# Patient Record
Sex: MALE | Race: WHITE | Employment: UNEMPLOYED | URBAN - METROPOLITAN AREA
[De-identification: names, ages, dates, MRNs, and addresses within clinical notes are randomized per-mention and may not be internally consistent; named-entity substitution may affect disease eponyms.]

---

## 2018-10-12 ENCOUNTER — RECORDS - HEALTHEAST (OUTPATIENT)
Dept: LAB | Facility: CLINIC | Age: 33
End: 2018-10-12

## 2018-10-12 LAB — VALPROATE SERPL-MCNC: 14.4 UG/ML (ref 50–150)

## 2018-10-26 ENCOUNTER — RECORDS - HEALTHEAST (OUTPATIENT)
Dept: LAB | Facility: CLINIC | Age: 33
End: 2018-10-26

## 2018-10-26 LAB — VALPROATE SERPL-MCNC: 41 UG/ML (ref 50–150)

## 2019-01-03 ENCOUNTER — RECORDS - HEALTHEAST (OUTPATIENT)
Dept: LAB | Facility: CLINIC | Age: 34
End: 2019-01-03

## 2019-01-03 LAB — VALPROATE SERPL-MCNC: 84.5 UG/ML (ref 50–150)

## 2019-11-02 ENCOUNTER — HOSPITAL ENCOUNTER (INPATIENT)
Facility: CLINIC | Age: 34
LOS: 57 days | Discharge: PSYCHIATRIC OR SUBSTANCE USE FACILITY | DRG: 885 | End: 2019-12-30
Attending: EMERGENCY MEDICINE | Admitting: PSYCHIATRY & NEUROLOGY
Payer: MEDICARE

## 2019-11-02 DIAGNOSIS — F29 PSYCHOSIS, UNSPECIFIED PSYCHOSIS TYPE (H): ICD-10-CM

## 2019-11-02 PROCEDURE — 99285 EMERGENCY DEPT VISIT HI MDM: CPT | Mod: 25 | Performed by: EMERGENCY MEDICINE

## 2019-11-02 PROCEDURE — 99285 EMERGENCY DEPT VISIT HI MDM: CPT | Mod: Z6 | Performed by: EMERGENCY MEDICINE

## 2019-11-02 SDOH — HEALTH STABILITY: MENTAL HEALTH: HOW OFTEN DO YOU HAVE A DRINK CONTAINING ALCOHOL?: NEVER

## 2019-11-03 PROBLEM — R46.89 DISORGANIZED BEHAVIOR: Status: ACTIVE | Noted: 2019-11-03

## 2019-11-03 PROCEDURE — 12400001 ZZH R&B MH UMMC

## 2019-11-03 PROCEDURE — 99223 1ST HOSP IP/OBS HIGH 75: CPT | Mod: AI | Performed by: PSYCHIATRY & NEUROLOGY

## 2019-11-03 RX ORDER — OLANZAPINE 10 MG/2ML
10 INJECTION, POWDER, FOR SOLUTION INTRAMUSCULAR 3 TIMES DAILY PRN
Status: DISCONTINUED | OUTPATIENT
Start: 2019-11-03 | End: 2019-12-30 | Stop reason: HOSPADM

## 2019-11-03 RX ORDER — ALUMINA, MAGNESIA, AND SIMETHICONE 2400; 2400; 240 MG/30ML; MG/30ML; MG/30ML
30 SUSPENSION ORAL EVERY 4 HOURS PRN
Status: DISCONTINUED | OUTPATIENT
Start: 2019-11-03 | End: 2019-12-30 | Stop reason: HOSPADM

## 2019-11-03 RX ORDER — ACETAMINOPHEN 325 MG/1
650 TABLET ORAL EVERY 4 HOURS PRN
Status: DISCONTINUED | OUTPATIENT
Start: 2019-11-03 | End: 2019-12-30 | Stop reason: HOSPADM

## 2019-11-03 RX ORDER — BISACODYL 10 MG
10 SUPPOSITORY, RECTAL RECTAL DAILY PRN
Status: DISCONTINUED | OUTPATIENT
Start: 2019-11-03 | End: 2019-12-30 | Stop reason: HOSPADM

## 2019-11-03 RX ORDER — OLANZAPINE 10 MG/1
10 TABLET ORAL 3 TIMES DAILY PRN
Status: DISCONTINUED | OUTPATIENT
Start: 2019-11-03 | End: 2019-12-30 | Stop reason: HOSPADM

## 2019-11-03 RX ORDER — HYDROXYZINE HYDROCHLORIDE 25 MG/1
25 TABLET, FILM COATED ORAL EVERY 4 HOURS PRN
Status: DISCONTINUED | OUTPATIENT
Start: 2019-11-03 | End: 2019-12-30 | Stop reason: HOSPADM

## 2019-11-03 RX ORDER — OLANZAPINE 10 MG/1
10 TABLET ORAL
Status: DISCONTINUED | OUTPATIENT
Start: 2019-11-03 | End: 2019-11-03

## 2019-11-03 RX ORDER — TRAZODONE HYDROCHLORIDE 50 MG/1
50 TABLET, FILM COATED ORAL
Status: DISCONTINUED | OUTPATIENT
Start: 2019-11-03 | End: 2019-12-06

## 2019-11-03 RX ORDER — OLANZAPINE 10 MG/2ML
10 INJECTION, POWDER, FOR SOLUTION INTRAMUSCULAR
Status: DISCONTINUED | OUTPATIENT
Start: 2019-11-03 | End: 2019-11-03

## 2019-11-03 ASSESSMENT — ACTIVITIES OF DAILY LIVING (ADL)
TOILETING: 0-->INDEPENDENT
DRESS: SCRUBS (BEHAVIORAL HEALTH);INDEPENDENT
RETIRED_EATING: 0-->INDEPENDENT
HYGIENE/GROOMING: PROMPTS
ORAL_HYGIENE: PROMPTS
SWALLOWING: 0-->SWALLOWS FOODS/LIQUIDS WITHOUT DIFFICULTY
BATHING: 0-->INDEPENDENT
AMBULATION: 0-->INDEPENDENT
COGNITION: 1 - ATTENTION OR MEMORY DEFICITS
RETIRED_COMMUNICATION: 2-->DIFFICULTY UNDERSTANDING (NOT RELATED TO LANGUAGE BARRIER)
LAUNDRY: UNABLE TO COMPLETE
DRESS: 0-->INDEPENDENT
TRANSFERRING: 0-->INDEPENDENT

## 2019-11-03 ASSESSMENT — MIFFLIN-ST. JEOR: SCORE: 1646.6

## 2019-11-03 NOTE — PLAN OF CARE
Patient was admitted on a 72 HH from [facility] ED to [facility] for disorganized behavior. Pt was picked up by [facility] police after interacting with him more than once, he was dressed inappropriately for the weather and appeared unable to care for himself. In ED pt was minimally cooperative, refused labs, UTOX, and LINDA, but states he has not been using drugs or drinking per ED RN report. Pt denies SI/HI upon arrival to the unit.    Pt did not request to notify anyone of his arrival to the unit.     Mental Health History: Unable to assess at this time, pt appears to be delayed and experiencing some thought blocking      Medical History: Per ED RN report patient has blister(s) on feet, noted to be limping/hobbling when walking, did not allow this writer to assess his feet or skin at this time. ED RN also reports that pt denies any recent fall hx. Pt denies any medication or food allergies.       Plan: Patient was minimally cooperative with search and admission process, denied SI/HI and wanted to go to bed. Patient affect blunt/fat and appears tense and guarded, audible teeth grinding noted. Patient was not placed on precautions at this time d/t no interview. Patient is code 1 status 15, was oriented to unit upon arrival. Will encourage patient to complete safety plan and participate in groups.

## 2019-11-03 NOTE — ED NOTES
Bed: HW01  Expected date: 11/2/19  Expected time: 10:03 PM  Means of arrival: Ambulance  Comments:  SPFD 23  34 M    Transport hold

## 2019-11-03 NOTE — PROGRESS NOTES
patient spent the early shift sleeping and the later shift in the lounge. He glared at varying people, ranging from patients to staff. He was also seen mouthing profanities and looking angry, but did not escalate toward anyone. He responded well to mildly firm directions and declined complying with most requests throughout the shift. He stood in one area by the coffee for several hours without major incident. Behavior and affect remains erratic and unpredictable.

## 2019-11-03 NOTE — PROGRESS NOTES
Attempted to examine patients feet at the request of primary nurse as he is limping and ED documentation states he has blisters on his feet. Patient states he has already had his feet checked and declines further exam at this time. Patient is very guarded. Will recheck later and see if patient is open to exam at that time.

## 2019-11-03 NOTE — PROGRESS NOTES
"Initial Psychosocial Assessment    I have reviewed the chart, attempted to meet with the patient    Patient declined to meet with [facility]. [facility] reviewed notes available for assessment, DEC assessment was not available.     The original assessment was completed by LASHANDA Hughes, which are in black.    The addendum of the assessment was completed by Ramesh Narayan [facility], which are in blue.The added information was obtained from the pre-petition screening report completed by Arnulfo Delarosa.    Presenting Problem:  Patient is a 34 year old male who was brought in by ambulance for disorganized behavior.  Police had been called and had found him inappropriately dressed for the weather on a bridge.  Per intake note, \"pt brought by police after they were called for the second time about concerns for his safety.  Pt was found on a bridge, disheveled and inappropriately dressed for the weather.  Police reported pt was visibly shaking due to the cold but refused to be taken somewhere for warmth.  Pt is withdrawn in the ED and stated, \"I'm fine\" and responded \"I don't know\" when he was asked how he got to the hospital.  MD also reported pt seems to be having issues with processing and appears psychotic.  No known MH hx or known hx of aggression.\"    Patient is on a 72 hour hold.      History of Mental Health and Chemical Dependency:  Hermann is currently homeless and returned to [place] sometime between the end of October 2019 and 11/2/19 for reasons unknown.  He came from [place] had previously spent some time in residential and lived with his father for a while.  His mom last saw him in [facility] this year.  She reported that Hermann reached out to his former  for assistance and they had also met with his PO officer who offered a bed at a homeless shelter, but patient declined.     He has be diagnosed with schizophrenia and bipolar disorder and marijuana use.   He has been evaluated at least 2 times " "by [facility] in 2016 and 2017.  In July 2016 he had checked himself into [facility] on a voluntary basis due to \"delusions that his brain was removed.\"    Patient was on a stayed commitment in 2016 and it was extended until October of 2017 due to dismissal because pt had moved to [place] to live with his father.. In 2016 he was placed at [facility] due to his behaviors and mental health. Records also indicates that his has been previously committed in [place].  They also indicates that he had been working with mental health providers through [facility].  He has received  case mgmt services through [employer]    In 2016, report indicates that he had been on Ranitidine, Trazodone, and Zyprexa,     Family Description (Constellation, Family Psychiatric History):  He was born and raised by his parents in [place], [place].  He has two older sisters.  His parents  when he was about `[age] yrs old.  Some time after the divorce he was court ordered to live with his father due to truancy issues and skipping school when he lived with his mother. Eventually he returned to live with his mother.  He was placed in foster care when he was [age] yrs old.   He did not graduate from high school and has not yet earned his GED.    Hermann has been  and  from his wife for many years and she is seeking a divorce.  They have they have a nine yr old daughter who lives in [place] with her mom.  Hermann also has a son who is about 3 yrs old, who he has never seen.     No family history of mental illness.  There is history of Alcoholism on his maternal side of the family.     Significant Life Events (Illness, Abuse, Trauma, Death):  Unknown    Living Situation:  Homeless from [place], recently came to [place] between 10/31/19 and 11/2/19    Educational Background:  Hermann started skipping school after his parents .   Truancy courts were involved.  He did not complete high school and he has not yet earned his GED. "     Occupational History:  Unemployed; during past 4-5 yrs he has worked part time jobs but loses them because he does not show up for work.     Financial Status:  SSDI he started receiving 2 or 3 yrs ago. And has Rep Payee services in place.     Legal Issues:  No current legal issues in [place].  He has a history of being involved with civil commitment court in [facility]. Per pre-petition screening report he also has a history of being in shelter in [place]. He is currently on probation in [place], [place].     Ethnic/Cultural Issues:  His presentation is similar to someone with catatonia.  It is very difficult  To communicate with him.  He spends a significant amount of time just standing a staring.   He may have some issues with his limited education as he never completed high school.     Spiritual Orientation:  Unknown     Service History  He is not a .     Social Functioning (organization, interests):  Inability to function and care for himself.  Per collateral information, Hermann does fine when he is supervised and taking medications.  Once he stops his medication he severely decompensates.  Based on the report, it seems that he has an inability to verbally communicate when decompensated, as his mother made reference to this to the PO.    Current Treatment Providers are:  No current mental health providers.    [facility] (Current)-in [place], [place], no other identifying information known.     [facility]   Phone: [phone] ext. [phone](NO ROIs and he is listed as confidential)    Princess Patel, Mother & Madi Patel, mother's  [phone] (NO ROIs and he is listed as confidential) Address [address] [place] [address]    Social Service Assessment/Plan:  The plan is to assess the patient for mental health and medication needs. The patient will be prescribed medications to treat the identified symptoms. Patient will  participate in therapeutic skill building groups on the unit. CTC to coordinate discharge/after care planning.

## 2019-11-03 NOTE — ED NOTES
ED to Behavioral Floor Handoff    SITUATION  Hermann Arita is a 34 year old male who speaks English and lives home status is unknown unknown The patient arrived in the ED by ambulance ( [email or web address]) from last time on bridge with a complaint of Suicidal (Pt was found by [facility] police twice today, last time Pt was on a bridge.  Pt not dressed appropriately for weather.)  .The patient's current symptoms started/worsened 1 day(s) ago and during this time the symptoms have increased.   In the ED, pt was diagnosed with   Final diagnoses:   Psychosis, unspecified psychosis type (H)        Initial vitals were: BP: 130/75  Pulse: 85  Temp: 97.6  F (36.4  C)  Resp: 16  Weight: (teddy)  SpO2: 99 %   --------  Is the patient diabetic? (pt would not answer)  If yes, last blood glucose? --     If yes, was this treated in the ED? --  --------  Is the patient inebriated (ETOH) No or Impaired on other substances? No (patient states he hasn't been drinking today  MSSA done? N/A  Last MSSA score: --    Were withdrawal symptoms treated? No  Does the patient have a seizure history? (pt would not answer) If yes, date of most recent seizure--  --------  Is the patient patient experiencing suicidal ideation? denies current or recent suicidal ideation   (from previous RN report)  Homicidal ideation? (pt would not answer)  Self-injurious behavior/urges? (pt would not answer)  ------  Was pt aggressive in the ED No  Was a code called No  Is the pt now cooperative? No  -------  Meds given in ED: Medications - No data to display   Family present during ED course? No  Family currently present? No    BACKGROUND  Does the patient have a cognitive impairment or developmental disability? No  Allergies: No Known Allergies.   Social demographics are   Social History     Socioeconomic History     Marital status: Single     Spouse name: None     Number of children: None     Years of education: None     Highest education level: None    Occupational History     None   Social Needs     Financial resource strain: None     Food insecurity:     Worry: None     Inability: None     Transportation needs:     Medical: None     Non-medical: None   Tobacco Use     Smoking status: Light Tobacco Smoker     Packs/day: 0.00     Smokeless tobacco: Never Used   Substance and Sexual Activity     Alcohol use: Not Currently     Frequency: Never     Drug use: None     Sexual activity: None   Lifestyle     Physical activity:     Days per week: None     Minutes per session: None     Stress: None   Relationships     Social connections:     Talks on phone: None     Gets together: None     Attends Congregational service: None     Active member of club or organization: None     Attends meetings of clubs or organizations: None     Relationship status: None     Intimate partner violence:     Fear of current or ex partner: None     Emotionally abused: None     Physically abused: None     Forced sexual activity: None   Other Topics Concern     None   Social History Narrative     None        ASSESSMENT  Labs results Labs Ordered and Resulted from Time of ED Arrival Up to the Time of Departure from the ED - No data to display   Imaging Studies: No results found for this or any previous visit (from the past 24 hour(s)).   Most recent vital signs /75   Pulse 85   Temp 97.6  F (36.4  C) (Oral)   Resp 16   SpO2 99%    Abnormal labs/tests/findings requiring intervention:---   Pain control: good  (pt had none)  Nausea control: pt had none    RECOMMENDATION  Are any infection precautions needed (MRSA, VRE, etc.)? No If yes, what infection? --  ---  Does the patient have mobility issues? independently. If yes, what device does the pt use? ---  ---  Is patient on 72 hour hold or commitment? Yes If on 72 hour hold, have hold and rights been given to patient? Yes  Are admitting orders written if after 10 p.m. ?N/A  Tasks needing to be completed:---     Mildred Bernard RN   ascom--  Main ED   [phone] West ED   [phone] East ED

## 2019-11-03 NOTE — PROGRESS NOTES
11/03/19 0128   Patient Belongings   Did you bring any home meds/supplements to the hospital?  No   Patient Belongings locker   Patient Belongings Remaining with Patient clothing   Patient Belongings Put in Hospital Secure Location (Security or Locker, etc.) clothing;shoes   Belongings Search Yes   Clothing Search Yes   Second Staff Fabio HUGHES   Comment Non sent to security     Items kept in storage  Jacket, Shoes, Hat, Lighter and cigs, $.56 ( fifty six cents) and Legal Document.    Non sent to security    No ID, No Credit Card, No Cash    Admission Signature    Patient Signature    ________________    Staff Signature    ________________    2nd Staff Signature if pt can't sign    ____________________    Admission Signature    All my personal items were returned to me    Patient Signature    _______________    Staff Signature    ________________

## 2019-11-03 NOTE — ED PROVIDER NOTES
"  History     Chief Complaint   Patient presents with     Suicidal     Pt was found by [facility] police twice today, last time Pt was on a bridge.  Pt not dressed appropriately for weather.     HPI  Hermann Arita is a 34 year old male who presents to the ED on a transport hold by police who found him on the bridge.  They said he was inappropriately dressed for 34 degree weather.  They say he was shaking from the cold and refused to go some place to get warm.  They were worried about his safety so brought him here.      I have reviewed the Medications, Allergies, Past Medical and Surgical History, and Social History in the Epic system.    Review of Systems   Unable to perform ROS: Other       Physical Exam   BP: 130/75  Pulse: 85  Temp: 97.6  F (36.4  C)  Resp: 16  Weight: (teddy)  SpO2: 99 %      Physical Exam  Vitals signs and nursing note reviewed.   Constitutional:       Comments: Sitting on a chair giving brief answers and saying everything is fine.  Says he doesn't know how he got here, or why he is here. Disheveled.mp eye contact. Turns his back to you when you try to talk to him.    HENT:      Head: Normocephalic and atraumatic.      Nose: Nose normal.   Eyes:      Extraocular Movements: Extraocular movements intact.   Neck:      Musculoskeletal: Normal range of motion.   Cardiovascular:      Rate and Rhythm: Normal rate and regular rhythm.      Heart sounds: Normal heart sounds.   Pulmonary:      Effort: Pulmonary effort is normal.      Breath sounds: Normal breath sounds.   Musculoskeletal:      Comments: \"waddles\" when he walks.  He has clear blisters on the bottom of his feet.  Pulses intact.  Skin warm.  No dark discoloration.    Skin:     General: Skin is warm and dry.   Neurological:      General: No focal deficit present.      Mental Status: He is oriented to person, place, and time.         ED Course        Procedures        Labs Ordered and Resulted from Time of ED Arrival Up to the Time of " "Departure from the ED - No data to display         Assessments & Plan (with Medical Decision Making)   The patient presents to the ED on a transport hold by police.  They were called to a disturbance and found him on a bridge, inappropriately dressed for the cold weather.  He was shaking and wouldn't leave to go to a warm area.  They were concerned for his safety and brought him here. He is sitting in a chair and keeps tensing his jaw muscle stating \"im fine\".  He seems psychotic or at least mentally unwell.  Given what police witnessed and his inability to give more history, I will place him on a hold and admit to inpatient mental health.  There is no evidence of head trauma to suggest doing a head CT.  His vitals are stable and don't suggest infection.   We will check basic labs.  He refuses to give urine sample, though he is refusing everything unless you become more directional in what you want to do.  For example, if you ask if you can check labs he says no but if you say we are checking labs, he then complies.  A have placed him on a 72 hour hold for admission.  Epic review shows no prior records here.     He refuses lab checks and urine check.      I have reviewed the nursing notes.    I have reviewed the findings, diagnosis, plan and need for follow up with the patient.    New Prescriptions    No medications on file       Final diagnoses:   Psychosis, unspecified psychosis type (H)       11/2/2019   [facility], [place], EMERGENCY DEPARTMENT     Adela Tobias MD  11/03/19 0021       Adela Tobias MD  11/03/19 0050    "

## 2019-11-03 NOTE — PROGRESS NOTES
Hermann refused his vital signs this afternoon. RN notified.       11/03/19 7076   Vital Signs   Temp   (Refused)   Pulse   (Refused)   BP   (Refused)

## 2019-11-03 NOTE — PROGRESS NOTES
"Pt was shown to his room as he requested to go to sleep and refused interview. When patient lay in his bed he stated \"who the fuck are you, don't look at me\" when his roommate was laying in his bed facing him. This writer asked pt x 4 if he was able to be safe with a roommate or if he felt like hurting someone else. Pt refused to answer until he eventually gave a verbal no on 4th request and closed his eyes. This writer notified ANS and intake of possibility that pt may not be roommate appropriate but will continue to assess and monitor behaviors. Pt has been placed in room across from desk and both roommates appear to be asleep at this time.   "

## 2019-11-03 NOTE — ED NOTES
"Requested Pt to provide a urine sample and to perform an alcohol breathalyzer test.  Pt refused, stated, \"I am not going to do that, I have not been drinking.\"  "

## 2019-11-03 NOTE — H&P
Admitted:     11/02/2019      CHIEF COMPLAINT:  A 34-year-old man brought in due to concerns of psychosis.      HISTORY OF PRESENT ILLNESS:  The patient is a 34-year-old man who was brought in as he was found outside dressed inappropriately for the weather by police.  When the police saw him, he was disorganized.  They brought him to the hospital because they were concerned about his mental health and his ability to take care of himself.  He continued to behave in the same way in the ER.  Did not answer questions.  Repeatedly said nothing was wrong.  Was mostly uncooperative.  ER physician also thought that he was likely psychotic.  We do not see a DEC assessment note.  When I saw the patient, he is tense in bed.  He repeatedly is clicking his jaw or biting something that in his mouth.  He denies everything.  Denies he has ever had a mental illness.  Denies ever been in the hospital.  Denies he has ever being prescribed medications.  He denies all symptoms on a comprehensive psychiatric review of systems.  Denies drug use.  Denies any past medical history.  Denies any family history.  In the end, the interview was extremely limited.  He did have significant delays in responses.  I did need to repeat a question a few times.  Seems to be clearly responding to internal stimuli.      PAST PSYCHIATRIC HISTORY:  Unknown, patient is uncooperative, but he denies that he has ever had any psychiatric history.      PAST MEDICAL HISTORY:  The patient denies any chronic or historical medical issues.      SUBSTANCE HISTORY:  The patient denies tobacco, alcohol or illicit substance use.      PHYSICAL REVIEW OF SYSTEMS:  The patient denies all problems on 10-point review of systems, except as noted in HPI.      FAMILY HISTORY:  The patient denies all family history of mental illness.      SOCIAL HISTORY:  The patient refused to answer where he lives.  I asked if he is homeless; he said no, but he would not tell me anything  otherwise.      ALLERGIES:  NO KNOWN DRUG ALLERGIES.      PRIOR TO ADMISSION MEDICATIONS:  The patient denies taking any medications or having any medications prescribed ever in his life.      LABORATORY RESULTS:  The patient has refused all laboratory workup thus far, including urine tox and blood work.      VITAL SIGNS:  Temperature 96.9, pulse is 86, respiratory rate is 18, blood pressure is 103/53, oxygen saturation 94% on room air.      PHYSICAL EXAMINATION:  I reviewed physical exam as taken by emergency room physician, Dr. Adela Tobias dated 11/02/2019.  I have no additional findings at this time.      MENTAL STATUS EXAMINATION:  The patient is awake.  He is alert.  He is oriented to person but not place or date.  He is wearing hospital scrubs.  He has poor eye contact.  When he does make eye contact, it is quite intense.  He is uncooperative.  He has a paucity of spontaneous speech.  Language is otherwise intact.  Mood is angry.  Affect is guarded, tense.  He has no psychomotor agitation or retardation.  Strength and tone and gait and station were not tested as patient refused to get out of bed.  Thought process is difficult to assess, appears concrete.  Associations appear intact.  Thought content notable for evidence of internal stimuli.  He seems to have some thought blocking.  Recent and remote memory are unable to be assessed due to lack of cooperation.  Attention and concentration are intact.  Fund of knowledge is assumed adequate but not able to be formally tested due to lack of cooperation.  Insight is poor.  Judgment is poor.      DIAGNOSES:     1.  Unspecified psychosis     2.  The patient apparently has blisters on his feet.  He did not allow me to examine him.      ASSESSMENT:  The patient is a 34-year-old man who appears to be psychotic at this point in time.  We have no known history.  He is completely uncooperative.  He is on a 72-hour hold.  He will require some observational time and team will  need to determine if they want to petition for commitment, given the fact that he was outside in the cold.  He would be at high risk for frostbite, hypothermia, or other problems related to exposure given the winter is approaching; may need to petition for commitment.      PLAN:   1.  Currently have olanzapine available, 10 mg 3 times daily, as needed for psychosis.  The patient is not agreeable to scheduled medications at this time and because he is here involuntarily, he would require neuroleptic consent, so will not order them right now.   2.  Primary team to consider getting Wound Care to look at his feet if they are able to determine that he indeed has some wounds that are concerning.   3.  Will order routine labs for tomorrow morning including CBC with platelets, differential, comprehensive metabolic panel, lipid panel and TSH with T4.  Primary team may also wish to consider neurological imaging, given that we have no history in this patient.   4.  Legal:  The patient is currently on a 72-hour hold.  Primary team to assess to determine if they need to petition for commitment if they will drop the hold.   5.  Disposition to be determined by primary team based on how they choose to resolve his 72-hour hold.         FISH HALL MD             D: 2019   T: 2019   MT: KIT      Name:     RALPH BAE   MRN:      [id number]        Account:      [id number]   :      1985        Admitted:     2019                   Document: [id number]

## 2019-11-04 PROCEDURE — 99233 SBSQ HOSP IP/OBS HIGH 50: CPT | Performed by: PSYCHIATRY & NEUROLOGY

## 2019-11-04 PROCEDURE — 12400001 ZZH R&B MH UMMC

## 2019-11-04 RX ORDER — OLANZAPINE 10 MG/1
10 TABLET, ORALLY DISINTEGRATING ORAL AT BEDTIME
Status: DISCONTINUED | OUTPATIENT
Start: 2019-11-04 | End: 2019-11-18

## 2019-11-04 ASSESSMENT — ACTIVITIES OF DAILY LIVING (ADL)
DRESS: INDEPENDENT
HYGIENE/GROOMING: INDEPENDENT
ORAL_HYGIENE: INDEPENDENT

## 2019-11-04 NOTE — PROGRESS NOTES
"[facility], [place]   Psychiatric Progress Note  Hospital Day: 1        Interim History:   The patient's care was discussed with the treatment team during the daily team meeting and/or staff's chart notes were reviewed.  Staff report patient has been intensely staring at various staff and patients. Affect remains erratic and unpredictable. Pt is very guarded. He appears to be responding to internal stimuli, frequently with subvocalizations. He would engage in odd maneurisms, such as extending arms while crouching down.     Upon interview, the patient was sleeping in his room. Multiple staff and I entered his room and attempted to awaken him multiple times. Pt then abruptly sat up in bed, looked at all team members, stood up, and said \"I need coffee.\" He then abruptly left his room and requested coffee. He then stood in hallway while drinking coffee quite rapidly. He did not make eye contact with any staff members. He swayed at times. He shook his head \"no\" when asked if he would meet in his room, and when asked to sign paperwork for Care Everywhere. He did not respond to any additional questions despite several attempts. Patient had no further requests or concerns.          Medications:          Allergies:   No Known Allergies       Labs:   No results found for this or any previous visit (from the past 24 hour(s)).       Psychiatric Examination:     /53   Pulse 86   Temp 96.9  F (36.1  C) (Oral)   Resp 18   Ht 1.702 m (5' 7\")   Wt 75.3 kg (166 lb)   SpO2 94%   BMI 26.00 kg/m    Weight is 166 lbs 0 oz  Body mass index is 26 kg/m .    Weight over time:  Vitals:    11/03/19 0128   Weight: 75.3 kg (166 lb)       Orthostatic Vitals     None            Cardiometabolic risk assessment. 11/04/19      Reviewed patient profile for cardiometabolic risk factors    Date taken /Value  REFERENCE RANGE   Abdominal Obesity  (Waist Circumference)   See nursing flowsheet Women ?35 in (88 cm) "   Men ?40 in (102 cm)      Triglycerides  No results found for: TRIG    ?150 mg/dL (1.7 mmol/L) or current treatment for elevated triglycerides   HDL cholesterol  No results found for: HDL]   Women <50 mg/dL (1.3 mmol/L) in women or current treatment for low HDL cholesterol  Men <40 mg/dL (1 mmol/L) in men or current treatment for low HDL cholesterol     Fasting plasma glucose (FPG) No results found for: GLC   FPG ?100 mg/dL (5.6 mmol/L) or treatment for elevated blood glucose   Blood pressure  BP Readings from Last 3 Encounters:   11/03/19 103/53    Blood pressure ?130/85 mmHg or treatment for elevated blood pressure   Family History  See family history     Appearance: awake, alert and disheveled . Subvocalizations. Audible grinding of his teeth.   Attitude:  guarded and uncooperative  Eye Contact:  poor , frequently looking at name board  Mood:  anxious  Affect:  mood congruent, intensity is blunted and guarded  Speech:  unable to assess. Pt was mostly unresponsive.  Language: fluent and intact in English  Psychomotor, Gait, Musculoskeletal: Pt swaying back and forth when standing. Grinding teeth repeatedly.  no evidence of tardive dyskinesia, dystonia, or tics  Throught Process:  disorganized and evidence of thought blocking present  Associations:  unable to assess  Thought Content:  patient appears to be responding to internal stimuli  Insight:  limited  Judgement:  limited  Oriented to:  unable to assess  Attention Span and Concentration:  poor  Recent and Remote Memory:  limited  Fund of Knowledge:  low-normal    Clinical Global Impressions  First:  Considering your total clinical experience with this particular patient population, how severe are the patient's symptoms at this time?: 7 (11/03/19 0907)  Compared to the patient's condition at the START of treatment, this patient's condition is:: 4 (11/03/19 0907)  Most recent:  Considering your total clinical experience with this particular patient population,  how severe are the patient's symptoms at this time?: 7 (11/03/19 0907)  Compared to the patient's condition at the START of treatment, this patient's condition is:: 4 (11/03/19 0907)           Precautions:     Behavioral Orders   Procedures     Code 1 - Restrict to Unit     Routine Programming     As clinically indicated     Status 15     Every 15 minutes.          Diagnoses:     Schizophrenia, decompensated         Assessment & Plan:     Assessment and hospital summary:  The patient is a 34-year-old man who appears to be psychotic at this point in time. He was brought to ED by police after he was located wandering near a bridge while wearing inappropriate clothing for cold weather. He had blisters and cuts on his feet. We have no known history.  He is completely uncooperative with examination.  He is on a 72-hour hold.  Will petition for MI commitment given he is at high risk for frostbite, hypothermia, or other problems related to exposure given the winter is approaching, has demonstrated a clear inability to care for self, and refusing psychotropic medications despite severe symptoms of psychosis.     Target psychiatric symptoms and interventions:  Add Zyprexa 10 mg at bedtime to target severe sx of psychosis. Pt may decline.     Medical Problems and Treatments:  No reported medical concerns. Will continue to monitor closely. Will again attempt lab draw in AM.    Behavioral/Psychological/Social:  Encourage unit programming  Pt has been consuming excessive amounts of coffee. Will attempt to limit to 2 cups of coffee before noon and 3 cups of coffee after noon.     Legal: 72 hr hold. Petitioning for MI commitment with [facility] through [facility].    Safety:  - Continue precautions as noted above  - Status 15 minute checks    Disposition: Pending clinical stabilization.     Ann Marie Woods MD  [facility] Psychiatry

## 2019-11-04 NOTE — PROGRESS NOTES
Refused labs, VS's and breakfast this morning. Slept in until Dr. Woods and staff woke patient up middle morning. Started drinking a lot of coffee after waking up. Limits were set on amount of coffee patient can drink. Would not let writer look at blisters on feet. Has been standing out in yun the rest of the morning.

## 2019-11-04 NOTE — PROGRESS NOTES
"Hermann spent most of the evening in the common areas isolating himself from engaging in conversation with peers.  He appears disorganized and preoccupied in thoughts.  He appears to be actively responding to internal stimuli, mouthing words and talking to himself.  In attempts to engage in casual conversation he oftentimes gives minimal and delayed responses.  He appears guarded, paranoid and suspicious.  At one time began staring at another patient in the dining room, even after he was given negative feedback from that patient he continued to stare, staff then intervened by encouraging him to watch a movie that was playing in the lounge.  He eventually came to the [facility] to watch the movie.  Throughout the evening he consumed copious amounts of fluids such as an entire carraffe of decaffeinated coffee, and consumed a great deal of snacks in addition to his dinner tray.    He came to Community Meeting but appeared disorganized and confused when asked \"What is your favorite food for Thanksgiving?\"  He was given some examples of what other patients gave as their answer, but he just stood there and stared at this writer.  He spent most of the group standing by the chairs during the group, at one time he crouched down with his arms extended in front of him and began to shake.  This happened again a few more times throughout the evening.  At the moment he currently has no scheduled medications and refuses to take PRN medications.     11/03/19 1435   Sleep/Rest/Relaxation   Day/Evening Time Hours up all shift   Behavioral Health   Hallucinations appears responding   Thinking confused;distractable;paranoid;poor concentration   Orientation person: oriented;place: oriented   Memory other (see comment)  (ZAHRA)   Insight poor   Judgement impaired   Eye Contact staring;at examiner   Affect blunted, flat;tense   Mood anxious;labile;irritable   Physical Appearance/Attire untidy;disheveled   Hygiene neglected grooming - unclean " body, hair, teeth   Suicidality other (see comments)  (ZAHRA)   Self Injury other (see comment)  (ZAHRA)   Elopement   (No concernable statements or behaviors observed. )   Activity isolative;withdrawn;restless   Speech coherent   Medication Sensitivity other (see comment)  (No scheduled meds. Refuses PRNs)   Psychomotor / Gait balanced;steady;tics   Overt Aggression Scale   Verbal Aggression 0   Aggression against Property 0   Auto-Aggression 0   Physical Aggression 0   Overt Aggression Total Score 0   Coping/Psychosocial Interventions   Supportive Measures relaxation techniques promoted;other (see comments)  (Appropriate distraction encouraged. )   Activities of Daily Living   Hygiene/Grooming prompts   Oral Hygiene prompts   Dress scrubs (behavioral health);independent   Laundry unable to complete   Room Organization unable   Hygiene Care Assistance   Hygiene Assistance independent   Groups   Details Art Therapy Group.  (Did not attend group.)

## 2019-11-04 NOTE — PLAN OF CARE
"BEHAVIORAL TEAM DISCUSSION    Participants: Lea Woods MD; Ramesh Narayan [facility]; Carmelina Ramos RN     Progress: Pt is on day 1 of hospital stay.  Hermann Arita, 34 year old White male was admitted to [facility] on 72 hour hold after he was found by [place] Police on a bridge disorganized, disheveled, and inappropriately dressed for 34  weather.  It was his second interaction with police for the day.  He was found shaking from the cold, but was not willing to leave and go to someplace warm.  The brought him in due to concern for his safety.  While in the emergency room he refused urine for drug screen and no breath test.  He provided minimal responses to questions asked, but continued to report that he was \"fine\".  He is currently homeless.  It is unknown if he has community providers or if he is on medications.  He was under a Stayed commitment to [facility] and [place] Commissioner in 2016 and was closed in 2017.  Since on unit he has displayed some odd mannerisms, overly drinking coffee, or in his room sleeping.  He has been disorganized, disheveled, and refused labs and vitals.  Staff have noted him to be glaring at peers and staff making them uncomfortable.  He refused to sign [facility] for Care Everywhere records.  He has been eating and sleeping adequately. He refused scheduled medications.  The treatment team will petition with [facility].    Anticipated length of stay: unknown; he is refusing medications and is quite symptomatic.  He is on 72 hour hold and treatment team is petitioning for civil commitment.     Continued Stay Criteria/Rationale: disorganized behaviors with refusal of treatment.  He is currently on a 72 hour hold that will  19.  Pt is a high risk if discharged to the community without appropriate care and treatment due to his inability to care for himself.  He is at a high risk for frost bite, hypothermia, and other problems related to exposure given winter is approaching and he was " inappropriately dressed in 34  prior to admission.  He has blisters on the bottom of his feet.    Medical/Physical: ER provider noted clear blisters on bottom of his feet and he was walking with a waddle.  Pt would not allow admitting psychiatrist, attending psychiatrist, nor nurse look at his feet.    Precautions:   Behavioral Orders   Procedures     Code 1 - Restrict to Unit     Routine Programming     As clinically indicated     Status 15     Every 15 minutes.     Plan: Target psychiatric symptoms and interventions:  Add Zyprexa 10 mg at bedtime to target severe sx of psychosis. Pt may decline.      Medical Problems and Treatments:  No reported medical concerns. Will continue to monitor closely. Will again attempt lab draw in AM.     Behavioral/Psychological/Social:  Encourage unit programming  Pt has been consuming excessive amounts of coffee. Will attempt to limit to 2 cups of coffee before noon and 3 cups of coffee after noon.      Legal: 72 hr hold. Petitioning for MI commitment with [facility] through [facility].     Safety:  - Continue precautions as noted above  - Status 15 minute checks     Disposition: Pending clinical stabilization.     Rationale for change in precautions or plan: 72 hour hold.

## 2019-11-04 NOTE — PROGRESS NOTES
Treatment team is petitioning for MI civil commitment with [facility].  Case was screened with Indira Tillman with [facility].  She reported that case will likely be assigned to Jessica Mon.  Appropriate documents were faxed.    Jessica will meet with patient tomorrow morning around 9am.

## 2019-11-05 PROCEDURE — 99233 SBSQ HOSP IP/OBS HIGH 50: CPT | Performed by: PSYCHIATRY & NEUROLOGY

## 2019-11-05 PROCEDURE — 12400001 ZZH R&B MH UMMC

## 2019-11-05 ASSESSMENT — ACTIVITIES OF DAILY LIVING (ADL)
DRESS: PROMPTS
DRESS: PROMPTS
LAUNDRY: UNABLE TO COMPLETE
ORAL_HYGIENE: PROMPTS
ORAL_HYGIENE: PROMPTS
HYGIENE/GROOMING: PROMPTS
HYGIENE/GROOMING: PROMPTS

## 2019-11-05 NOTE — PLAN OF CARE
Pt refused labs today, refused breakfast and vitals. Pt laying in bed and just shakes his head no other response.  Will continue to monitor pt closely.

## 2019-11-05 NOTE — PROGRESS NOTES
Pt spent most of the evening sleeping in his room. He was isolative and withdrawn. He wasn't very cooperative this evening, but he was calm. He refused to have his vitals taken, have his blood drawn by lab staff, or check in with the writer. At one point, he came out of his room and stood at the  but was not responding to staff's willingness to assist him with something. He ate his dinner and watched tv/movies for a while. He didn't have any behavioral outbursts.      11/04/19 2215   Behavioral Health   Hallucinations other (see comment)  (ZAHRA)   Thinking confused;poor concentration   Orientation person: oriented   Memory other (see comment)  (ZAHRA)   Insight poor   Judgement impaired   Eye Contact at examiner   Affect blunted, flat;tense   Mood mood is calm   Physical Appearance/Attire appears stated age;attire appropriate to age and situation   Hygiene neglected grooming - unclean body, hair, teeth   Suicidality other (see comments)  (ZAHRA)   1. Wish to be Dead (Recent)   (ZAHRA)   2. Non-Specific Active Suicidal Thoughts (Recent)   (ZAHRA)   Self Injury other (see comment)  (ZAHRA)   Elopement   (none)   Activity withdrawn;isolative   Speech clear   Medication Sensitivity no stated side effects;no observed side effects   Psychomotor / Gait steady;balanced   Activities of Daily Living   Hygiene/Grooming independent   Oral Hygiene independent   Dress independent   Room Organization independent

## 2019-11-05 NOTE — PROGRESS NOTES
"patient spent the early shift by the desk, staring at staff. He spent the latter half sleeping and napping. No cooperation for treatment with some responding to redirection to get away from the desk. He denies all issues, but certainly seems disoriented, not knowing where he was beyond \"[place].\"       11/05/19 6937   Behavioral Health   Hallucinations other (see comment)  (ZAHRA)   Thinking confused;paranoid   Orientation date, disoriented;time, disoriented;place, disoriented;person, disoriented   Insight poor;denial of illness   Judgement impaired   Eye Contact at examiner;staring   Affect blunted, flat   Mood mood is calm   Physical Appearance/Attire disheveled;untidy   Hygiene neglected grooming - unclean body, hair, teeth   1. Wish to be Dead (Recent) No   2. Non-Specific Active Suicidal Thoughts (Recent) No   Activity isolative;withdrawn   Speech clear;pressured   Medication Sensitivity no stated side effects;no observed side effects   Psychomotor / Gait balanced;steady   Psycho Education   Type of Intervention 1:1 intervention   Response participates, initiates socially appropriate   Hours 0.5   Activities of Daily Living   Hygiene/Grooming prompts   Oral Hygiene prompts   Dress prompts   Room Organization independent     "

## 2019-11-05 NOTE — PROGRESS NOTES
Pre-petition screener came to meet with patient today, however he refused to meet with her.    No updates from the county/screener as of 3:30pm today.  Will check in with screener tomorrow to see if they have come up with a determination.

## 2019-11-05 NOTE — PROGRESS NOTES
Jessica Elieser, from [facility] Pre-petition, came to the unit today. She requested and  I gave her the legal paperwork.    I don't believe that I keagan Forced Neuroleptic paperwork but will check.    Staff attempted to encourage pt to meet with the screener but he refused to do so. Screener was going to approach him in his room with staff.    I placed a c opy of the legal paperwork for scanning and placed a copy in  the chart.

## 2019-11-05 NOTE — PROGRESS NOTES
"[facility], [place]   Psychiatric Progress Note  Hospital Day: 2        Interim History:   The patient's care was discussed with the treatment team during the daily team meeting and/or staff's chart notes were reviewed.  Staff report patient affect remains erratic and unpredictable. Pt is very guarded. He appears to be responding to internal stimuli, frequently with subvocalizations. He is drinking excessive amounts of coffee. Eating well. He has been refusing assessment of his feet. He has remained calm with no significant behavioral concerns, though has been mostly uncooperative with labs, ROIs, and vitals. Declined meeting with prepetition screener today.    Upon interview, the patient was awake and standing near  in hallway. He initially was very guarded though slightly more forthcoming as interview progressed. He said that he would like to leave the hospital now and \"walk around.\" He did not respond when asked where he would go or where he was previously staying. He acknowledged that he has no friends or family in [place]. He then said \"I want to go to a different hospital so I am not locked up,\" though again did not elaborate. He said \"I don't need medications\" when asked again whether medications have been helpful for him. He denied any physical pain. When asked if nursing staff could examine his feet, he said \"No, they're fine. Better now.\" He again declined when asked to sign paperwork for Care Everywhere. He did not respond to any additional questions despite several attempts. Patient had no further requests or concerns.          Medications:       OLANZapine zydis  10 mg Oral At Bedtime          Allergies:   No Known Allergies       Labs:   No results found for this or any previous visit (from the past 24 hour(s)).       Psychiatric Examination:     /53   Pulse 86   Temp 96.9  F (36.1  C) (Oral)   Resp 18   Ht 1.702 m (5' 7\")   Wt 75.3 kg (166 lb)   SpO2 94%  " " BMI 26.00 kg/m    Weight is 166 lbs 0 oz  Body mass index is 26 kg/m .    Weight over time:  Vitals:    11/03/19 0128   Weight: 75.3 kg (166 lb)       Orthostatic Vitals     None            Cardiometabolic risk assessment. 11/04/19      Reviewed patient profile for cardiometabolic risk factors    Date taken /Value  REFERENCE RANGE   Abdominal Obesity  (Waist Circumference)   See nursing flowsheet Women ?35 in (88 cm)   Men ?40 in (102 cm)      Triglycerides  No results found for: TRIG    ?150 mg/dL (1.7 mmol/L) or current treatment for elevated triglycerides   HDL cholesterol  No results found for: HDL]   Women <50 mg/dL (1.3 mmol/L) in women or current treatment for low HDL cholesterol  Men <40 mg/dL (1 mmol/L) in men or current treatment for low HDL cholesterol     Fasting plasma glucose (FPG) No results found for: GLC   FPG ?100 mg/dL (5.6 mmol/L) or treatment for elevated blood glucose   Blood pressure  BP Readings from Last 3 Encounters:   11/03/19 103/53    Blood pressure ?130/85 mmHg or treatment for elevated blood pressure   Family History  See family history     Appearance: awake, alert and disheveled . Subvocalizations. Audible grinding of his teeth. Swaying back and forth with arms crossed.  Attitude:  guarded and uncooperative  Eye Contact:  poor , frequently looking at name board  Mood:  anxious  Affect:  mood congruent, intensity is blunted and guarded  Speech:  unable to assess. Pt was mostly unresponsive. though more communicative today.  Language: fluent and intact in English  Psychomotor, Gait, Musculoskeletal: Pt swaying back and forth when standing. Grinding teeth repeatedly.  no evidence of tardive dyskinesia, dystonia, or tics  Throught Process:  disorganized and evidence of thought blocking present  Associations:  unable to assess  Thought Content:  patient appears to be responding to internal stimuli  Insight:  limited  Judgement:  limited  Oriented to:  Person. Said that it was \"the 11th " "month\" of the year. Knew it was 2019. Believed he was in [place], though knew he is in [place].   Attention Span and Concentration:  poor  Recent and Remote Memory:  limited  Fund of Knowledge:  low-normal    Clinical Global Impressions  First:  Considering your total clinical experience with this particular patient population, how severe are the patient's symptoms at this time?: 7 (11/03/19 0907)  Compared to the patient's condition at the START of treatment, this patient's condition is:: 4 (11/03/19 0907)  Most recent:  Considering your total clinical experience with this particular patient population, how severe are the patient's symptoms at this time?: 7 (11/03/19 0907)  Compared to the patient's condition at the START of treatment, this patient's condition is:: 4 (11/03/19 0907)           Precautions:     Behavioral Orders   Procedures     Code 1 - Restrict to Unit     Routine Programming     As clinically indicated     Status 15     Every 15 minutes.          Diagnoses:     Schizophrenia, decompensated         Assessment & Plan:     Assessment and hospital summary:  The patient is a 34-year-old man who appears to be psychotic at this point in time. He was brought to ED by police after he was located wandering near a bridge while wearing inappropriate clothing for cold weather. He had blisters and cuts on his feet. We have no known history.  He is completely uncooperative with examination.  He is on a 72-hour hold.  Will petition for MI commitment given he is at high risk for frostbite, hypothermia, or other problems related to exposure given the winter is approaching, has demonstrated a clear inability to care for self, and refusing psychotropic medications despite severe symptoms of psychosis.     Target psychiatric symptoms and interventions:  Resume Zyprexa 10 mg at bedtime to target severe sx of psychosis. Pt may decline.     Medical Problems and Treatments:  Pt continues to refuse foot examination, though " denies pain.  No reported medical concerns. Will continue to monitor closely. Will again attempt lab draw in AM.    Behavioral/Psychological/Social:  Encourage unit programming  Pt has been consuming excessive amounts of coffee. Will attempt to limit to 2 cups of coffee before noon and 3 cups of coffee after noon.     Legal: 72 hr hold. Petitioning for MI commitment with [employer] through [facility].    Safety:  - Continue precautions as noted above  - Status 15 minute checks    Disposition: Pending clinical stabilization. Will likely benefit from discharge to a structured setting.    Ann Marie Woods MD  [facility] Psychiatry

## 2019-11-06 PROCEDURE — 12400001 ZZH R&B MH UMMC

## 2019-11-06 ASSESSMENT — ACTIVITIES OF DAILY LIVING (ADL)
DRESS: STREET CLOTHES;SCRUBS (BEHAVIORAL HEALTH)
LAUNDRY: UNABLE TO COMPLETE
HYGIENE/GROOMING: PROMPTS
LAUNDRY: WITH SUPERVISION
ORAL_HYGIENE: INDEPENDENT
ORAL_HYGIENE: INDEPENDENT
HYGIENE/GROOMING: INDEPENDENT
DRESS: SCRUBS (BEHAVIORAL HEALTH)

## 2019-11-06 NOTE — PROGRESS NOTES
Hermann refused his vital signs this afternoon. RN notified.       11/06/19 0282   Vital Signs   Temp   (Refused)   Pulse   (Refused)   BP   (Refused)

## 2019-11-06 NOTE — PROGRESS NOTES
CTC received call from Arnulfo Mondragon, Jessica Lopez reported that she staffed the case and they are supporting the petition for civil commitment due to mental illness.  She also noted that she was able to get contact information for his mother who is in [place].  She reported that patient's mother provided valuable information.  She noted that she would have her report submitted by 12pm and team should hear from the courts by the end of business.

## 2019-11-06 NOTE — PROGRESS NOTES
Pt refused to check-in with this writer. He was calm with blunted, flat mood affect, isolative and withdrawn to his room for the majority part of the shift, otherwise he was visibile in common areas usually standing in the dinning area. He refused to eat breakfast, but ate his lunch 100 percent.       11/06/19 1333   Behavioral Health   Thinking poor concentration   Orientation person, disoriented;place, disoriented;date, disoriented;time, disoriented   Insight denial of illness   Judgement impaired   Eye Contact at examiner   Affect blunted, flat   Mood mood is calm   Physical Appearance/Attire attire appropriate to age and situation   Hygiene neglected grooming - unclean body, hair, teeth   Suicidality   (nothing observed or stated )   1. Wish to be Dead (Recent)   (nothing observed or stated )   2. Non-Specific Active Suicidal Thoughts (Recent)   (nothing observed or stated )   Self Injury   (nothing observed or stated )   Elopement   (nothing observed or stated )   Activity withdrawn;isolative   Speech mute;pressured   Medication Sensitivity   (nothing observed or stated )   Psychomotor / Gait steady;balanced   Psycho Education   Type of Intervention other (see comment)   Response observes from a distance   Hours 0.5   Activities of Daily Living   Hygiene/Grooming independent   Oral Hygiene independent   Dress street clothes;scrubs (behavioral health)   Laundry with supervision   Room Organization independent

## 2019-11-06 NOTE — PROGRESS NOTES
Arpita from [facility] called giving a verbal, stating that patient was placed on a court ordered hold as of 1614 11/6/2019.

## 2019-11-06 NOTE — PROGRESS NOTES
Pt spent the first half of the evening standing in the yun.  He was not able to answer staff's questions.  Pt was encouraged to shower but refused.  Pt did eat dinner in the lounge.  Pt did move to his bed after finishing.

## 2019-11-07 PROCEDURE — 12400001 ZZH R&B MH UMMC

## 2019-11-07 PROCEDURE — 99232 SBSQ HOSP IP/OBS MODERATE 35: CPT | Performed by: PSYCHIATRY & NEUROLOGY

## 2019-11-07 RX ORDER — LORAZEPAM 1 MG/1
1-2 TABLET ORAL 3 TIMES DAILY PRN
Status: DISCONTINUED | OUTPATIENT
Start: 2019-11-07 | End: 2019-12-30 | Stop reason: HOSPADM

## 2019-11-07 ASSESSMENT — ACTIVITIES OF DAILY LIVING (ADL): DRESS: SCRUBS (BEHAVIORAL HEALTH)

## 2019-11-07 NOTE — PROGRESS NOTES
Writer received pre-petition screening report and updated patient's psychosocial assessment.  Writer also added patient's mother's name and contact info into the system in case emergency, however patient remains a confidential listing.

## 2019-11-07 NOTE — PROGRESS NOTES
"Patient spent the early part of the shift in the hallway and kitchen area standing, holding on to his scrub pants and shifting his feet back and forth. Patient declined a check in and is refusing his HS medication. Declined to have vitals done. Ate dinner and snack. Patient's adls are poor and was offered to give him new scrubs but declined. Patient not socializing with peers or staff except to make requests for coffee. Cooperative on unit.     Patient evaluation continues. Assessed mood,anxiety,thoughts and behavior.     Patient gradually progressing towards goals.    Patient is encouraged to participate in groups and assisted to develop healthy coping skills.     VS reviewed: /53   Pulse 86   Temp 96.9  F (36.1  C) (Oral)   Resp 18   Ht 1.702 m (5' 7\")   Wt 75.3 kg (166 lb)   SpO2 94%   BMI 26.00 kg/m      Length of stay: 3    Refer to daily team meeting notes for individualized plan of care. Nursing will continue to assess.      "

## 2019-11-07 NOTE — PROGRESS NOTES
Patient slept through mid-morning before emerging from his room about 1030.  Overall presentation - quiet and withdrawn with minimal social or program engagement.  Spent most of his milieu time standing at different locations rocking back and forth from one foot to another and staring into space, frequently appearing to be responding to internal stimuli (talking to himself).  Refused vital signs and most other staff requests and interventions.  Extremely guarded in writer's attempted check-ins with him.  Essentially denied all MH concerns and issues.  Has presented no behavioral management issues today.  Hygiene remains poor.  Appetite appeared selective.   Davian Florence   11/07/2019

## 2019-11-07 NOTE — PROGRESS NOTES
"[facility], [place]   Psychiatric Progress Note  Hospital Day: 4        Interim History:   The patient's care was discussed with the treatment team during the daily team meeting and/or staff's chart notes were reviewed.  Staff report that patient is very guarded. He appears to be responding to internal stimuli, frequently with subvocalizations and disorganized thought process. Eating well. He has been refusing assessment of his feet. He has remained calm with no significant behavioral concerns, though has been mostly uncooperative with labs, ROIs, and vitals. Declining scheduled Zyprexa. Frequently swaying back and forth while responding to internal stimuli.     Upon interview, the patient was awake and standing in lounge. He declined to meet with writer 1:1. He said that he is \"good\" when asked about mood. He said \"good\" when asked how he is sleeping. He denied any physical health concerns. He said that medications have not been helpful and he will not take them. When asked about Ativan specifically, he said \"I don't like that one.\" He did not respond when asked why. He then appeared more tense, stared out window with evidence of subvocalizations. When asked who he was talking to and what he was saying, he said \"I was talking to you.\" He then continued to move his mouth frequently and grind his teeth. He did not respond to any additional questions despite several attempts. Patient had no further requests or concerns.          Medications:       OLANZapine zydis  10 mg Oral At Bedtime          Allergies:   No Known Allergies       Labs:   No results found for this or any previous visit (from the past 24 hour(s)).       Psychiatric Examination:     /53   Pulse 86   Temp 96.9  F (36.1  C) (Oral)   Resp 18   Ht 1.702 m (5' 7\")   Wt 75.3 kg (166 lb)   SpO2 94%   BMI 26.00 kg/m    Weight is 166 lbs 0 oz  Body mass index is 26 kg/m .    Weight over time:  Vitals:    11/03/19 0128 " "  Weight: 75.3 kg (166 lb)       Orthostatic Vitals     None            Cardiometabolic risk assessment. 11/04/19      Reviewed patient profile for cardiometabolic risk factors    Date taken /Value  REFERENCE RANGE   Abdominal Obesity  (Waist Circumference)   See nursing flowsheet Women ?35 in (88 cm)   Men ?40 in (102 cm)      Triglycerides  No results found for: TRIG    ?150 mg/dL (1.7 mmol/L) or current treatment for elevated triglycerides   HDL cholesterol  No results found for: HDL]   Women <50 mg/dL (1.3 mmol/L) in women or current treatment for low HDL cholesterol  Men <40 mg/dL (1 mmol/L) in men or current treatment for low HDL cholesterol     Fasting plasma glucose (FPG) No results found for: GLC   FPG ?100 mg/dL (5.6 mmol/L) or treatment for elevated blood glucose   Blood pressure  BP Readings from Last 3 Encounters:   No data found for BP    Blood pressure ?130/85 mmHg or treatment for elevated blood pressure   Family History  See family history     Appearance: awake, alert and disheveled . Subvocalizations. Audible grinding of his teeth. Swaying back and forth with arms crossed.  Attitude:  guarded and uncooperative  Eye Contact:  poor , frequently looking at name board  Mood:  anxious  Affect:  mood congruent, intensity is blunted and guarded  Speech:  unable to assess. Pt was mostly unresponsive. though more communicative today.  Language: fluent and intact in English  Psychomotor, Gait, Musculoskeletal: Pt swaying back and forth when standing. Grinding teeth repeatedly.  no evidence of tardive dyskinesia, dystonia, or tics  Throught Process:  disorganized and evidence of thought blocking present  Associations:  unable to assess  Thought Content:  patient appears to be responding to internal stimuli  Insight:  limited  Judgement:  limited  Oriented to:  Person. Said that it was \"the 11th month\" of the year. Knew it was 2019. Believed he was in [place], though knew he is in [place].   Attention Span and " Concentration:  poor  Recent and Remote Memory:  limited  Fund of Knowledge:  low-normal    Clinical Global Impressions  First:  Considering your total clinical experience with this particular patient population, how severe are the patient's symptoms at this time?: 7 (11/03/19 0907)  Compared to the patient's condition at the START of treatment, this patient's condition is:: 4 (11/03/19 0907)  Most recent:  Considering your total clinical experience with this particular patient population, how severe are the patient's symptoms at this time?: 7 (11/03/19 0907)  Compared to the patient's condition at the START of treatment, this patient's condition is:: 4 (11/03/19 0907)           Precautions:     Behavioral Orders   Procedures     Code 1 - Restrict to Unit     Routine Programming     As clinically indicated     Status 15     Every 15 minutes.          Diagnoses:     Schizophrenia, decompensated         Assessment & Plan:     Assessment and hospital summary:  The patient is a 34-year-old man who appears to be psychotic at this point in time. He was brought to ED by police after he was located wandering near a bridge while wearing inappropriate clothing for cold weather. He had blisters and cuts on his feet. We have no known history.  He is completely uncooperative with examination.  He is on a 72-hour hold.  Will petition for MI commitment given he is at high risk for frostbite, hypothermia, or other problems related to exposure given the winter is approaching, has demonstrated a clear inability to care for self, and refusing psychotropic medications despite severe symptoms of psychosis.     Target psychiatric symptoms and interventions:  Resume Zyprexa 10 mg at bedtime to target severe sx of psychosis. Pt may decline.     Medical Problems and Treatments:  Pt continues to refuse foot examination, though denies pain.  No reported medical concerns. Will continue to monitor closely. Will discontinue labs as patient has  consistently declined them.    Behavioral/Psychological/Social:  Encourage unit programming  Pt has been consuming excessive amounts of coffee. Will attempt to limit to 2 cups of coffee before noon and 3 cups of coffee after noon.     Legal: Court hold.    Safety:  - Continue precautions as noted above  - Status 15 minute checks    Disposition: Pending clinical stabilization. Will likely benefit from discharge to a structured setting.    Ann Marie Woods MD  [facility]

## 2019-11-07 NOTE — PROGRESS NOTES
Legal Documentation Note    Important Case Details  Involved County: [place]    Court File #: [id number]  Type of Order: Pre-petition Screening report  Effective Date:   Date of Expiration:   Assigned [facility] Worker/: Jessica Mon  [facility] Worker/ Info: [phone]    Order Specifics  Date Order Received:11/07/19  Method of Receipt:  served    Details of Court Order    Patient's : Mariano Redman  [phone]  Examination/Preliminary Hearing Date: Tuesday, November 12, 2019 @ 9am  Hearing: Friday, November 15, 2019    Did patient receive a copy? Yes  Was treatment team notified?Yes

## 2019-11-07 NOTE — PROGRESS NOTES
11/07/19 0842   Vital Signs   Temp   (refused)   Temp src   (refused)   Pulse   (refused)   BP   (refused)

## 2019-11-08 PROCEDURE — 12400001 ZZH R&B MH UMMC

## 2019-11-08 PROCEDURE — 99232 SBSQ HOSP IP/OBS MODERATE 35: CPT | Performed by: PSYCHIATRY & NEUROLOGY

## 2019-11-08 ASSESSMENT — ACTIVITIES OF DAILY LIVING (ADL)
HYGIENE/GROOMING: INDEPENDENT
ORAL_HYGIENE: INDEPENDENT
LAUNDRY: WITH SUPERVISION
DRESS: INDEPENDENT

## 2019-11-08 NOTE — PROGRESS NOTES
"[facility], [place]   Psychiatric Progress Note  Hospital Day: 5        Interim History:   The patient's care was discussed with the treatment team during the daily team meeting and/or staff's chart notes were reviewed.  Staff report that patient is quiet and withdrawn with minimal social or program engagement. He spent most of his time rocking back and forth appearing to be responding to internal stimuli with subvocalizations. Refusing vital signs and labs. Denied all MH sx. Hygiene is poor. Appetite appeared selective.    Upon interview, the patient was sleeping in his room. Did not wish to meet in interview room. He said \"Good\" when asked about appetite, sleep, and mood. When I inquired about his children, he said \"I don't want to talk about that.\" He did not respond when asked why he came to [place] from [place]. He denied physical pain or physical health concerns. He then continued to move his mouth frequently and grind his teeth. He did not respond to any additional questions despite several attempts. Patient had no further requests or concerns.          Medications:       OLANZapine zydis  10 mg Oral At Bedtime          Allergies:   No Known Allergies       Labs:   No results found for this or any previous visit (from the past 24 hour(s)).       Psychiatric Examination:     /53   Pulse 86   Temp 96.9  F (36.1  C) (Oral)   Resp 18   Ht 1.702 m (5' 7\")   Wt 75.3 kg (166 lb)   SpO2 94%   BMI 26.00 kg/m    Weight is 166 lbs 0 oz  Body mass index is 26 kg/m .    Weight over time:  Vitals:    11/03/19 0128   Weight: 75.3 kg (166 lb)       Orthostatic Vitals     None            Cardiometabolic risk assessment. 11/04/19      Reviewed patient profile for cardiometabolic risk factors    Date taken /Value  REFERENCE RANGE   Abdominal Obesity  (Waist Circumference)   See nursing flowsheet Women ?35 in (88 cm)   Men ?40 in (102 cm)      Triglycerides  No results found for: TRIG    " "?150 mg/dL (1.7 mmol/L) or current treatment for elevated triglycerides   HDL cholesterol  No results found for: HDL]   Women <50 mg/dL (1.3 mmol/L) in women or current treatment for low HDL cholesterol  Men <40 mg/dL (1 mmol/L) in men or current treatment for low HDL cholesterol     Fasting plasma glucose (FPG) No results found for: GLC   FPG ?100 mg/dL (5.6 mmol/L) or treatment for elevated blood glucose   Blood pressure  BP Readings from Last 3 Encounters:   No data found for BP    Blood pressure ?130/85 mmHg or treatment for elevated blood pressure   Family History  See family history     Appearance: Initially asleep but then later awake, alert and disheveled . Subvocalizations. Audible grinding of his teeth.  Attitude:  guarded and uncooperative  Eye Contact:  poor , frequently looking at name board  Mood:  anxious  Affect:  mood congruent, intensity is blunted and guarded  Speech:  unable to assess. Pt was mostly unresponsive. though more communicative today.  Language: fluent and intact in English  Psychomotor, Gait, Musculoskeletal: Pt swaying back and forth when standing. Grinding teeth repeatedly.  no evidence of tardive dyskinesia, dystonia, or tics  Throught Process:  disorganized and evidence of thought blocking present  Associations:  unable to assess  Thought Content:  patient appears to be responding to internal stimuli  Insight:  limited  Judgement:  limited  Oriented to:  Person. Said that it was \"the 11th month\" of the year. Knew it was 2019. Believed he was in [place], though knew he is in [place].   Attention Span and Concentration:  poor  Recent and Remote Memory:  limited  Fund of Knowledge:  low-normal    Clinical Global Impressions   First:  Considering your total clinical experience with this particular patient population, how severe are the patient's symptoms at this time?: 7 (11/03/19 0907)  Compared to the patient's condition at the START of treatment, this patient's condition is:: 4 " (11/03/19 0907)  Most recent:  Considering your total clinical experience with this particular patient population, how severe are the patient's symptoms at this time?: 7 (11/03/19 0907)  Compared to the patient's condition at the START of treatment, this patient's condition is:: 4 (11/03/19 0907)           Precautions:     Behavioral Orders   Procedures     Code 1 - Restrict to Unit     Routine Programming     As clinically indicated     Status 15     Every 15 minutes.          Diagnoses:     Schizophrenia, decompensated         Assessment & Plan:     Assessment and hospital summary:  The patient is a 34-year-old man who appears to be psychotic at this point in time. He was brought to ED by police after he was located wandering [place] a bridge while wearing inappropriate clothing for cold weather. He had blisters and cuts on his feet. We have no known history.  He is completely uncooperative with examination.  He is on a 72-hour hold.  Will petition for MI commitment given he is at high risk for frostbite, hypothermia, or other problems related to exposure given the winter is approaching, has demonstrated a clear inability to care for self, and refusing psychotropic medications despite severe symptoms of psychosis.     Target psychiatric symptoms and interventions:  Resume Zyprexa 10 mg at bedtime to target severe sx of psychosis. Pt may decline.     Medical Problems and Treatments:  Pt continues to refuse foot examination, though denies pain.  No reported medical concerns. Will continue to monitor closely. Will discontinue labs as patient has consistently declined them.    Behavioral/Psychological/Social:  Encourage unit programming  Pt has been consuming excessive amounts of coffee. Will attempt to limit to 2 cups of coffee before noon and 3 cups of coffee after noon.     Legal: Court hold.    Safety:  - Continue precautions as noted above  - Status 15 minute checks    Disposition: Pending clinical stabilization.  Will likely benefit from discharge to a structured setting.    Ann Marie Woods MD  [facility]

## 2019-11-08 NOTE — PROGRESS NOTES
11/08/19 1400   Behavioral Health   Hallucinations denies / not responding to hallucinations  (Pt. appears to be staring out into space)   Orientation   (Unknowin. Pt appears aggitated by too many questions.)   Judgement impaired   Eye Contact staring   Affect blunted, flat   Mood mood is calm   Physical Appearance/Attire untidy   Hygiene neglected grooming - unclean body, hair, teeth   Suicidality other (see comments)  (Pt.denies any suicidal thoughts.)   1. Wish to be Dead (Recent) No   2. Non-Specific Active Suicidal Thoughts (Recent) No   Elopement   (Pt.s behaviour appears to be unpredictable.)   Activity isolative;withdrawn;restless   Speech pressured;other (see comments)  (Pt. appears irritated by questions.)   Medication Sensitivity no stated side effects;no observed side effects   Psychomotor / Gait balanced;steady  (Pt often swaying back & forth, kicking alt. legs over the ot)

## 2019-11-08 NOTE — PROGRESS NOTES
Hermann was in the lounge/hallway staring and rocking back and forth. Pt is completely silent unless spoken to, and then responds with pressured speech. Pt appears untidy. Pt ate an adequate amount during mealtimes. Pt refused to check in, but did deny SI/SIB. Pt appears very disoriented and often walks around as if he is lost, and has been observed standing in front of the TV unknowingly. Pt went to lay down around 2100 and remained in his bedroom.      11/07/19 2149   Behavioral Health   Hallucinations other (see comment)  (ZAHRA)   Thinking distractable;poor concentration;confused   Orientation other (see comment)  (ZAHRA)   Memory other (see comment)  (ZAHRA)   Insight poor   Judgement impaired   Eye Contact staring   Affect tense;blunted, flat   Mood anxious;mood is calm   Physical Appearance/Attire untidy   Hygiene neglected grooming - unclean body, hair, teeth   Suicidality other (see comments)  (Pt denies SI/SIB)   1. Wish to be Dead (Recent) No   2. Non-Specific Active Suicidal Thoughts (Recent) No   Self Injury other (see comment)  (Pt denies SI/SIB)   Elopement   (No observed statements/behaviors of concern)   Activity restless;withdrawn   Speech pressured;other (see comments)  (Pressured speech when approached, but otherwise quiet)   Medication Sensitivity other (see comment)  (ZAHRA)   Psychomotor / Gait balanced;steady   Psycho Education   Type of Intervention 1:1 intervention   Response refuses   Hours 0.5   Treatment Detail   (check in)   Activities of Daily Living   Hygiene/Grooming   (ZAHRA)   Oral Hygiene   (ZAHRA)   Dress scrubs (behavioral health)   Laundry   (ZAHRA)   Room Organization independent

## 2019-11-09 PROCEDURE — 12400001 ZZH R&B MH UMMC

## 2019-11-09 ASSESSMENT — ACTIVITIES OF DAILY LIVING (ADL)
LAUNDRY: WITH SUPERVISION
DRESS: SCRUBS (BEHAVIORAL HEALTH)
ORAL_HYGIENE: PROMPTS
DRESS: SCRUBS (BEHAVIORAL HEALTH);INDEPENDENT
HYGIENE/GROOMING: SHOWER;PROMPTS
HYGIENE/GROOMING: PROMPTS

## 2019-11-09 NOTE — PROGRESS NOTES
"Patient has spent majority of the shift in the lounge area/hallway. Patient stands in the hallway shifting back and forth and holding his pants. Patient ate dinner and requested coffee x 2. Patient does not speak with other patient's or staff unless making a request. Patient observed trying grab and shake another patient's hand as he walked by. Patient stares and appears preoccupied. Declining medication.      Patient evaluation continues. Assessed mood,anxiety,thoughts and behavior.     Patient gradually progressing towards goals.    Patient is encouraged to participate in groups and assisted to develop healthy coping skills.     VS reviewed: /53   Pulse 86   Temp 96.9  F (36.1  C) (Oral)   Resp 18   Ht 1.702 m (5' 7\")   Wt 75.3 kg (166 lb)   SpO2 94%   BMI 26.00 kg/m      Length of stay: 5    Refer to daily team meeting notes for individualized plan of care. Nursing will continue to assess.      "

## 2019-11-10 PROCEDURE — 12400001 ZZH R&B MH UMMC

## 2019-11-10 ASSESSMENT — ACTIVITIES OF DAILY LIVING (ADL)
ORAL_HYGIENE: INDEPENDENT
HYGIENE/GROOMING: INDEPENDENT;PROMPTS
DRESS: INDEPENDENT;SCRUBS (BEHAVIORAL HEALTH)

## 2019-11-10 NOTE — PLAN OF CARE
Patient continues to give short yes or no answer, or will say 'Im ok'.  Standing in front of dest holding up his pants rocking back and forth.  His appetite is good, states sleeping 'fine'  Nothing to say to peers or staff except to ask for a cup of coffee and a cup of milk. Refused clean scrubs or shower.will continue to monitor.

## 2019-11-10 NOTE — PROGRESS NOTES
Hermann spent 70% of the evening rocking back and forth holding his pants in either the hallway or lounge. Pt then went to his bedroom around 2000 and remained there for the evening. Pt responded to staff mostly by nodding, and refused a check in, but did deny SI/SIB and all MH symptoms. Pt ate at mealtimes.     11/09/19 3697   Behavioral Health   Hallucinations appears responding   Thinking poor concentration   Orientation situation, disoriented   Memory other (see comment)  (ZAHRA)   Insight poor   Judgement impaired   Eye Contact staring   Affect blunted, flat   Mood mood is calm   Physical Appearance/Attire untidy;disheveled   Hygiene neglected grooming - unclean body, hair, teeth   Suicidality other (see comments)  (Pt denies SI/SIB)   1. Wish to be Dead (Recent) No   2. Non-Specific Active Suicidal Thoughts (Recent) No   Self Injury other (see comment)  (Pt denies SI/SIB)   Elopement   (No observed behaviors/statements of concern)   Activity withdrawn   Speech mute;other (see comments)  (Responded to staff with mostly gestural nods)   Medication Sensitivity other (see comment)  (ZAHRA)   Psychomotor / Gait balanced;steady   Psycho Education   Type of Intervention 1:1 intervention   Response refuses   Hours 0.5   Treatment Detail   (Check in)

## 2019-11-10 NOTE — PROGRESS NOTES
Pt refused vital     11/10/19 1700   Vital Signs   Temp   (pt refused)   Pulse   (pt refused)   BP   (pt refused)      Take the bactrim prescribed  Keep area clean dry and covered  Podiatry referral

## 2019-11-11 PROCEDURE — 99231 SBSQ HOSP IP/OBS SF/LOW 25: CPT | Performed by: PSYCHIATRY & NEUROLOGY

## 2019-11-11 PROCEDURE — 12400001 ZZH R&B MH UMMC

## 2019-11-11 ASSESSMENT — ACTIVITIES OF DAILY LIVING (ADL)
DRESS: INDEPENDENT
ORAL_HYGIENE: INDEPENDENT
HYGIENE/GROOMING: INDEPENDENT

## 2019-11-11 NOTE — PROGRESS NOTES
Pt spent the majority of the evening standing in the hallway, rocking side to side on his feet. He appeared tense and anxious. A couple of times he was observed to appear very agitated by silently yelling to himself, head-banging in the air, and aggressively swinging his arm in the air. These episodes only lasted 2 or 3 seconds. He refused to have his vitals taken or check in with the writer. He was withdrawn and didn't interact with any peers or staff. He appeared to be talking to himself at times.      11/10/19 2200   Behavioral Health   Hallucinations appears responding   Thinking poor concentration   Orientation person: oriented   Memory other (see comment)  (ZAHRA)   Insight poor   Judgement impaired   Eye Contact staring   Affect tense;blunted, flat   Mood anxious   Physical Appearance/Attire appears stated age;posture rigid   Hygiene body odor;neglected grooming - unclean body, hair, teeth   Suicidality other (see comments)  (ZAHRA)   1. Wish to be Dead (Recent)   (ZAHRA)   2. Non-Specific Active Suicidal Thoughts (Recent)   (ZAHRA)   Self Injury other (see comment)  (ZAHRA)   Elopement   (none)   Activity withdrawn   Speech coherent   Medication Sensitivity no stated side effects;no observed side effects   Psychomotor / Gait balanced;steady   Activities of Daily Living   Hygiene/Grooming independent;prompts   Oral Hygiene independent   Dress independent;scrubs (behavioral health)   Room Organization independent

## 2019-11-11 NOTE — PLAN OF CARE
BEHAVIORAL TEAM DISCUSSION    Participants: Ann Marie Woods MD; Ramesh Narayan [facility]; Dalia Salinas RN    Progress: The patient is on day 8 of hospital stay.  He remains due to being on a court hold while in the petition process and due to continued symptoms that significant impair his ability to function and care for himself.  Pt continues to refuse medications.  He continues to stand and stare in the milieu for hours.  He says were very little, at time appears to be mumbling to himself.  He continues to drink a a lot of coffee and is on coffee intake restriction.  His sleep and appetite are adequate.  ADLs are poor.  He is not participating in offered group programming as it would not be beneficial to him at this time due to his inability to engage and get anything from group programming.    Anticipated length of stay: unknown in petition process.  He is also homeless and from [place].     Continued Stay Criteria/Rationale:He remains due to being on a court hold while in the petition process and due to continued symptoms that significant impair his ability to function and care for himself.  Pt continues to refuse medications.  He continues to stand and stare in the milieu for hours.  He says were very little, at time appears to be mumbling to himself.     Medical/Physical: continues to refuse labs, vitals, and let staff check his feet for blisters.    Precautions:   Behavioral Orders   Procedures    Code 1 - Restrict to Unit    Routine Programming     As clinically indicated    Status 15     Every 15 minutes.     Plan: Target psychiatric symptoms and interventions:  Resume Zyprexa 10 mg at bedtime to target severe sx of psychosis. Pt may decline.      Medical Problems and Treatments:  Pt continues to refuse foot examination, though denies pain.  No reported medical concerns. Will continue to monitor closely. Will discontinue labs as patient has consistently declined them.     Behavioral/Psychological/Social:  Encourage  unit programming  Pt has been consuming excessive amounts of coffee. Will attempt to limit to 2 cups of coffee before noon and 3 cups of coffee after noon.      Legal: Court hold.     Safety:  - Continue precautions as noted above  - Status 15 minute checks     Disposition: Pending clinical stabilization. Will likely benefit from discharge to a structured setting.    Rationale for change in precautions or plan: no changes.

## 2019-11-11 NOTE — PLAN OF CARE
Pt is out of his room standing by the desk holding his pants while rocking back and forth holding an empty cup.  Pt was observed talking to himself and does appear to be responding.  Pt ate both meals.  Pt has minimal interaction with others, will come ask for coffee. Declined a formal check in and responded by saying No.  Will continue to monitor pt closely.

## 2019-11-11 NOTE — PROGRESS NOTES
"[facility], [place]   Psychiatric Progress Note  Hospital Day: 8        Interim History:   The patient's care was discussed with the treatment team during the daily team meeting and/or staff's chart notes were reviewed.  Staff report that patient is quiet and withdrawn with minimal social or program engagement. He spent most of his time rocking back and forth appearing to be responding to internal stimuli with subvocalizations. Refusing vital signs and labs. Denied all MH sx. Hygiene is poor. Appetite appeared selective. At times, he appeared quite agitated by silently yelling to himself, head banging in the air and aggressively swinging his arm in the air. These episodes have lasted 2-3 seconds.    Upon interview, the patient was sleeping in his room. Did not wish to meet in interview room. He said \"good\" when asked about appetite, sleep, and mood. He denied AH, VH, paranoia, SI, and HI. He denied physical pain or physical health concerns. Patient had no further requests or concerns.          Medications:       OLANZapine zydis  10 mg Oral At Bedtime          Allergies:   No Known Allergies       Labs:   No results found for this or any previous visit (from the past 24 hour(s)).       Psychiatric Examination:     /53   Pulse 86   Temp 96.9  F (36.1  C) (Oral)   Resp 18   Ht 1.702 m (5' 7\")   Wt 75.3 kg (166 lb)   SpO2 94%   BMI 26.00 kg/m    Weight is 166 lbs 0 oz  Body mass index is 26 kg/m .    Weight over time:  Vitals:    11/03/19 0128   Weight: 75.3 kg (166 lb)       Orthostatic Vitals     None            Cardiometabolic risk assessment. 11/04/19      Reviewed patient profile for cardiometabolic risk factors    Date taken /Value  REFERENCE RANGE   Abdominal Obesity  (Waist Circumference)   See nursing flowsheet Women ?35 in (88 cm)   Men ?40 in (102 cm)      Triglycerides  No results found for: TRIG    ?150 mg/dL (1.7 mmol/L) or current treatment for elevated " "triglycerides   HDL cholesterol  No results found for: HDL]   Women <50 mg/dL (1.3 mmol/L) in women or current treatment for low HDL cholesterol  Men <40 mg/dL (1 mmol/L) in men or current treatment for low HDL cholesterol     Fasting plasma glucose (FPG) No results found for: GLC   FPG ?100 mg/dL (5.6 mmol/L) or treatment for elevated blood glucose   Blood pressure  BP Readings from Last 3 Encounters:   No data found for BP    Blood pressure ?130/85 mmHg or treatment for elevated blood pressure   Family History  See family history     Appearance: Initially asleep but then later awake, alert and disheveled . Subvocalizations. Audible grinding of his teeth.  Attitude:  guarded and somewhat cooperative  Eye Contact:  poor   Mood:  anxious  Affect:  mood congruent, intensity is blunted and guarded  Speech:  unable to assess. Pt was mostly unresponsive. though more communicative since admission  Language: fluent and intact in English  Psychomotor, Gait, Musculoskeletal: Pt swaying back and forth when standing. Grinding teeth repeatedly.  no evidence of tardive dyskinesia, dystonia, or tics  Throught Process:  disorganized and evidence of thought blocking present  Associations:  unable to assess  Thought Content:  patient appears to be responding to internal stimuli  Insight:  limited  Judgement:  limited  Oriented to:  Person. Said that it was \"the 11th month\" of the year. Knew it was 2019. Believed he was in [place], though knew he is in [place].   Attention Span and Concentration:  poor  Recent and Remote Memory:  limited  Fund of Knowledge:  low-normal    Clinical Global Impressions   First:  Considering your total clinical experience with this particular patient population, how severe are the patient's symptoms at this time?: 7 (11/03/19 0907)  Compared to the patient's condition at the START of treatment, this patient's condition is:: 4 (11/03/19 0907)  Most recent:  Considering your total clinical experience with this " particular patient population, how severe are the patient's symptoms at this time?: 7 (11/03/19 0907)  Compared to the patient's condition at the START of treatment, this patient's condition is:: 4 (11/03/19 0907)           Precautions:     Behavioral Orders   Procedures     Code 1 - Restrict to Unit     Routine Programming     As clinically indicated     Status 15     Every 15 minutes.          Diagnoses:     Schizophrenia, decompensated         Assessment & Plan:     Assessment and hospital summary:  The patient is a 34-year-old man who appears to be psychotic at this point in time. He was brought to ED by police after he was located wandering near a bridge while wearing inappropriate clothing for cold weather. He had blisters and cuts on his feet. We have no known history.  He is completely uncooperative with examination.  He is on a 72-hour hold.  Will petition for MI commitment given he is at high risk for frostbite, hypothermia, or other problems related to exposure given the winter is approaching, has demonstrated a clear inability to care for self, and refusing psychotropic medications despite severe symptoms of psychosis.     Target psychiatric symptoms and interventions:  Resume Zyprexa 10 mg at bedtime to target severe sx of psychosis. Pt may decline.     Medical Problems and Treatments:  Pt continues to refuse foot examination, though denies pain.  No reported medical concerns. Will continue to monitor closely. Will discontinue labs as patient has consistently declined them.    Behavioral/Psychological/Social:  Encourage unit programming  Pt has been consuming excessive amounts of coffee. Will attempt to limit to 2 cups of coffee before noon and 3 cups of coffee after noon.     Legal: Court hold.    Safety:  - Continue precautions as noted above  - Status 15 minute checks    Disposition: Pending clinical stabilization. Will likely benefit from discharge to a structured setting.    Ann Marie Woods  MD  [facility]

## 2019-11-12 PROCEDURE — 12400001 ZZH R&B MH UMMC

## 2019-11-12 PROCEDURE — 99231 SBSQ HOSP IP/OBS SF/LOW 25: CPT | Performed by: PSYCHIATRY & NEUROLOGY

## 2019-11-12 ASSESSMENT — ACTIVITIES OF DAILY LIVING (ADL)
ORAL_HYGIENE: INDEPENDENT
HYGIENE/GROOMING: INDEPENDENT
DRESS: SCRUBS (BEHAVIORAL HEALTH)
LAUNDRY: UNABLE TO COMPLETE
DRESS: SCRUBS (BEHAVIORAL HEALTH);INDEPENDENT

## 2019-11-12 NOTE — PROGRESS NOTES
Legal Documentation Note    Important Case Details  Involved County: [place]   Court File #: [id number]   Type of Order: Order for Continuing Hold and Transportation  Effective Date: 11/12/19  Date of Expiration: after order received from final hearing.   Assigned County Worker/:   County Worker/ Info:     Order Specifics  Date Order Received:11/12/19  Method of Receipt: fax @ 1:11pm    Details of Court Order    Patient shall remain held at [facility] until trial on November 15, 2019 @ 9:45am.      shall provide transportation on trial date.    Did patient receive a copy? Yes  Was treatment team notified?Yes    Writer provided patient with a copy of the order while he was sitting in the dining area with empty lunch tray.  Writer called patient's name and patient acknowledged writer.  Pt took document.  A few hours after document given to patient, staff brought the document to writer stating that it was just sitting in the dining area on a table.  Copy will be placed n front pocket of chart and labeled as patient's copy.

## 2019-11-12 NOTE — PROGRESS NOTES
"[facility], [place]   Psychiatric Progress Note  Hospital Day: 9        Interim History:   The patient's care was discussed with the treatment team during the daily team meeting and/or staff's chart notes were reviewed.  Staff report that patient is quiet and withdrawn with minimal social or program engagement. He spent most of his time rocking back and forth appearing to be responding to internal stimuli with subvocalizations. Refusing vital signs and labs. Denied all MH sx. Hygiene is poor. Appetite appeared selective. At times, he appeared quite agitated by silently yelling to himself, head banging in the air and aggressively swinging his arm in the air. These episodes have lasted 2-3 seconds.    Upon interview, the patient was sleeping in his room. Did not wish to meet in interview room. He said \"I am fine\" when asked about appetite, sleep, and mood. When asked about other questions or concerns, he yelled \"I am fine,\" and appeared quite agitated momentarily. He did not respond to any additional questions.         Medications:       OLANZapine zydis  10 mg Oral At Bedtime          Allergies:   No Known Allergies       Labs:   No results found for this or any previous visit (from the past 24 hour(s)).       Psychiatric Examination:     /53   Pulse 86   Temp 96.9  F (36.1  C) (Oral)   Resp 18   Ht 1.702 m (5' 7\")   Wt 75.3 kg (166 lb)   SpO2 94%   BMI 26.00 kg/m    Weight is 166 lbs 0 oz  Body mass index is 26 kg/m .    Weight over time:  Vitals:    11/03/19 0128   Weight: 75.3 kg (166 lb)       Orthostatic Vitals     None            Cardiometabolic risk assessment. 11/04/19      Reviewed patient profile for cardiometabolic risk factors    Date taken /Value  REFERENCE RANGE   Abdominal Obesity  (Waist Circumference)   See nursing flowsheet Women ?35 in (88 cm)   Men ?40 in (102 cm)      Triglycerides  No results found for: TRIG    ?150 mg/dL (1.7 mmol/L) or current " "treatment for elevated triglycerides   HDL cholesterol  No results found for: HDL]   Women <50 mg/dL (1.3 mmol/L) in women or current treatment for low HDL cholesterol  Men <40 mg/dL (1 mmol/L) in men or current treatment for low HDL cholesterol     Fasting plasma glucose (FPG) No results found for: GLC   FPG ?100 mg/dL (5.6 mmol/L) or treatment for elevated blood glucose   Blood pressure  BP Readings from Last 3 Encounters:   No data found for BP    Blood pressure ?130/85 mmHg or treatment for elevated blood pressure   Family History  See family history     Appearance: Initially asleep but then later awake, alert and disheveled . Subvocalizations. Audible grinding of his teeth.  Attitude:  guarded and somewhat cooperative  Eye Contact:  poor   Mood:  anxious  Affect:  mood congruent, intensity is blunted and guarded  Speech:  unable to assess. Pt was mostly unresponsive. though more communicative since admission  Language: fluent and intact in English  Psychomotor, Gait, Musculoskeletal: Pt swaying back and forth when standing. Grinding teeth repeatedly.  no evidence of tardive dyskinesia, dystonia, or tics  Throught Process:  disorganized and evidence of thought blocking present  Associations:  unable to assess  Thought Content:  patient appears to be responding to internal stimuli  Insight:  limited  Judgement:  limited  Oriented to:  Person. Said that it was \"the 11th month\" of the year. Knew it was 2019. Believed he was in [place], though knew he is in [place].   Attention Span and Concentration:  poor  Recent and Remote Memory:  limited  Fund of Knowledge:  low-normal    Clinical Global Impressions   First:  Considering your total clinical experience with this particular patient population, how severe are the patient's symptoms at this time?: 7 (11/03/19 0907)  Compared to the patient's condition at the START of treatment, this patient's condition is:: 4 (11/03/19 0907)  Most recent:  Considering your total " clinical experience with this particular patient population, how severe are the patient's symptoms at this time?: 7 (11/03/19 0907)  Compared to the patient's condition at the START of treatment, this patient's condition is:: 4 (11/03/19 0907)           Precautions:     Behavioral Orders   Procedures     Code 1 - Restrict to Unit     Routine Programming     As clinically indicated     Status 15     Every 15 minutes.          Diagnoses:     Schizophrenia, decompensated         Assessment & Plan:     Assessment and hospital summary:  The patient is a 34-year-old man who appears to be psychotic at this point in time. He was brought to ED by police after he was located wandering [place] a bridge while wearing inappropriate clothing for cold weather. He had blisters and cuts on his feet. We have no known history.  He is completely uncooperative with examination.  He is on a 72-hour hold.  Will petition for MI commitment given he is at high risk for frostbite, hypothermia, or other problems related to exposure given the winter is approaching, has demonstrated a clear inability to care for self, and refusing psychotropic medications despite severe symptoms of psychosis.     Target psychiatric symptoms and interventions:  Resume Zyprexa 10 mg at bedtime to target severe sx of psychosis. Pt may decline.     Medical Problems and Treatments:  Pt continues to refuse foot examination, though denies pain.  No reported medical concerns. Will continue to monitor closely. Will discontinue labs as patient has consistently declined them.    Behavioral/Psychological/Social:  Encourage unit programming    Legal: Court hold.    Safety:  - Continue precautions as noted above  - Status 15 minute checks    Disposition: Pending clinical stabilization. Will likely benefit from discharge to a structured setting.    Ann Marie Woods MD  [facility]

## 2019-11-12 NOTE — PROGRESS NOTES
No SI/SIB observed or reported. Pt stands in hte yun holding cup requesting coffee. Pt was did not wish to shower.  Pt visible in [place] but does not communicate with staff or peers. Pr ate snacks and dinner.      11/11/19 2204   Behavioral Health   Hallucinations appears responding   Thinking distractable;poor concentration   Orientation person: oriented   Memory confabulation   Insight poor   Judgement impaired   Affect blunted, flat   Suicidality other (see comments)  (none observed or reported)   Self Injury other (see comment)  (none reported or observed)   Activity withdrawn   Activities of Daily Living   Hygiene/Grooming independent   Oral Hygiene independent   Dress independent   Room Organization independent

## 2019-11-12 NOTE — PLAN OF CARE
Patient sleeping most this shift, Usually gets up in time for lunch. Eats well when out of bed, Continues to answer questions with one word. Patient was asked if he was going to go to [facility] and he stated no asked a couple of times and answer was no again,  He also refused to talk to his .  He continues to have poor ADL'S refuses shower, clean scrubs, has not even opened his toothbrush.     Will continue to monitor

## 2019-11-13 PROCEDURE — 12400001 ZZH R&B MH UMMC

## 2019-11-13 ASSESSMENT — ACTIVITIES OF DAILY LIVING (ADL)
HYGIENE/GROOMING: INDEPENDENT
ORAL_HYGIENE: INDEPENDENT;PROMPTS
DRESS: STREET CLOTHES;SCRUBS (BEHAVIORAL HEALTH);INDEPENDENT
DRESS: SCRUBS (BEHAVIORAL HEALTH);INDEPENDENT
HYGIENE/GROOMING: INDEPENDENT;PROMPTS

## 2019-11-13 NOTE — PROGRESS NOTES
Hermann refused his vital signs this afternoon. RN notified.       11/13/19 1610   Vital Signs   Temp   (Refused)   Pulse   (Refused)   BP   (Refused)

## 2019-11-13 NOTE — PROGRESS NOTES
Patient visible intermittently in the milieu today between periods of resting and napping in his room.  Overall presentation - quiet, withdrawn, irritable and guarded on approach, and generally uncooperative with staff requests, protocols, etc.  Generally abrupt and dismissive of staff check-in inquiries, continuing to adamantly deny all significant MH issues, concerns, and acuities.  When not in his room, patient was seen standing in various locations, rocking back and forth from one foot to the other staring vacantly and appearing to be talking to himself.  Has so far not presented any behavioral management issues today.   Davian Florence   11/13/2019 11/13/19 1417   Sleep/Rest/Relaxation   Day/Evening Time Hours resting in bed   Number of hours napping 2 hours   Number of hours resting in bed 2 hours   Cognitive   Level Of Consciousness alert   Arousal Level arouses to voice   Orientation disoriented to;situation   Follows Commands inconsistent   Speech clear;spontaneous   Best Language 0 - No aphasia   Mood/Behavior flat affect;hostile;hypoactive (quiet, withdrawn);uncooperative;withdrawn;calm   Behavioral Health   Hallucinations auditory;appears responding   Thinking delusional;paranoid   Orientation situation, disoriented   Insight denial of illness;poor   Judgement impaired   Eye Contact at examiner   Affect blunted, flat;tense;irritable   Physical Appearance/Attire disheveled   Hygiene neglected grooming - unclean body, hair, teeth   Suicidality other (see comments)  (denied SI)   1. Wish to be Dead (Recent) No   2. Non-Specific Active Suicidal Thoughts (Recent) No   Self Injury other (see comment)  (denied SIB urges)   Elopement Distress about legal situation (holds for mental health or criminal)   Activity isolative;withdrawn;restless;refusal   Speech clear;coherent   Psychomotor / Gait slow;balanced;steady   Overt Aggression Scale   Verbal Aggression 0   Aggression against Property 0    Auto-Aggression 0   Physical Aggression 0   Overt Aggression Total Score 0   Psycho Education   Type of Intervention 1:1 intervention   Response refuses   Treatment Detail MH status review - attempted   Safety   Suicidality Status 15   Activities of Daily Living   Hygiene/Grooming independent   Dress street clothes;scrubs (behavioral health);independent   Groups   Details OT Clinic

## 2019-11-13 NOTE — PROGRESS NOTES
Hermann spent 50% of the shift rocking back and forth and staring in the milieu. Pt attended community meeting, but stared, fixating on a spot and did not participate. Pt refused to check in or answer any questions related to MH. Pt appears tense and answers in an irritable tone.  Pt went to sleep around 1930 and remained in bed. Pt drank several cups of decaf (3-5 this evening). Pt ate at mealtimes.     11/12/19 2140   Behavioral Health   Hallucinations appears responding   Thinking distractable;poor concentration   Orientation other (see comment)  (ZAHRA)   Memory other (see comment)  (ZAHRA)   Insight poor   Judgement impaired   Eye Contact staring   Affect tense;blunted, flat;angry   Mood irritable;anxious   Physical Appearance/Attire untidy   Hygiene neglected grooming - unclean body, hair, teeth   Suicidality other (see comments)  (ZAHRA)   1. Wish to be Dead (Recent)   (ZAHRA)   2. Non-Specific Active Suicidal Thoughts (Recent)   (ZAHRA)   Self Injury other (see comment)  (ZAHRA)   Elopement   (No observed behaviors/statements of concern)   Activity restless;hyperactive (agitated, impulsive)   Speech pressured;mute   Medication Sensitivity other (see comment)  (ZAHRA; refuses medicaiton)   Psychomotor / Gait hyperactive  (rocking )   Psycho Education   Type of Intervention 1:1 intervention   Response refuses   Hours 0.5   Treatment Detail   (Check in)   Activities of Daily Living   Hygiene/Grooming   (ZAHRA)   Oral Hygiene   (ZAHRA)   Dress scrubs (behavioral health);independent   Laundry   (ZAHRA)   Room Organization independent

## 2019-11-13 NOTE — PROVIDER NOTIFICATION
"Pt spent the beginning of this evening shift loitering by the nurses desk, holding up his scrub pants, and shifting his weight from foot to foot. Pt asked for coffee a few times by holding out his empty cup. Pt was given 2 cups of coffee this evening shift. Pt retreated to his room about 7 PM and fell asleep. Writer entered pt's room to offer him his HS Zyprexa. Pt said \"Hi\" when writer introduced self, but after explaining the medication and some encouragement, pt stopped responding at all. Writer stood in his room for several minutes hoping he would communicate or change his mind, but he did not.   "

## 2019-11-14 PROCEDURE — 99231 SBSQ HOSP IP/OBS SF/LOW 25: CPT | Performed by: PSYCHIATRY & NEUROLOGY

## 2019-11-14 PROCEDURE — 12400001 ZZH R&B MH UMMC

## 2019-11-14 ASSESSMENT — ACTIVITIES OF DAILY LIVING (ADL)
ORAL_HYGIENE: PROMPTS
LAUNDRY: UNABLE TO COMPLETE
DRESS: SCRUBS (BEHAVIORAL HEALTH)
HYGIENE/GROOMING: INDEPENDENT;PROMPTS

## 2019-11-14 NOTE — PROGRESS NOTES
"Pt appeared to be in a tense, irritable mood this evening. He spent his time standing near the coffee carafe, rocking side to side on his feet. He was observed talking to himself and starring at the wall. He didn't interact with peers or staff, though he would respond if staff talked to him. He did not attend community meeting. He was observed by staff to be potentially touching his genitalia and when staff asked him if he would keep his hands away from his pants, he forcefully said, \"Is this really how you want it to go down?\" He also said, \"last time I jerked off.\" Later, he was standing at the  for an extended period of time and when the writer asked if he could move to another area, he yelled, \"you really fucked up this time!\" He had poor ADLs and did not respond well to prompts. He declined to check in with staff.      11/13/19 [facility]   Behavioral Health   Hallucinations appears responding   Thinking delusional;paranoid   Orientation situation, disoriented   Memory other (see comment)  (ZAHRA)   Insight poor   Judgement impaired   Eye Contact at examiner   Affect blunted, flat;tense;irritable   Mood irritable;anxious   Physical Appearance/Attire appears stated age;disheveled;untidy;posture rigid   Hygiene neglected grooming - unclean body, hair, teeth;body odor   Suicidality other (see comments)  (ZAHRA)   1. Wish to be Dead (Recent)   (Zahra)   2. Non-Specific Active Suicidal Thoughts (Recent)   (ZAHRA)   Self Injury other (see comment)  (ZAHRA)   Elopement   (none)   Activity withdrawn;isolative;restless   Speech clear   Medication Sensitivity no stated side effects;no observed side effects   Psychomotor / Gait balanced;steady   Activities of Daily Living   Hygiene/Grooming independent;prompts   Oral Hygiene independent;prompts   Dress scrubs (behavioral health);independent   Room Organization independent     "

## 2019-11-14 NOTE — PROGRESS NOTES
Patient has remained in his room all shift so far today resting and sleeping.  Overall presentation on approach remains dismissive, irritable, and uncooperative. Refused breakfast, vital signs, and allowed only the briefest of check-in times (writer's).  Continues to deny all signifcant MH issues, concerns, and acuities.  So far has not presented any behavioral management concerns.   Davian Florence   11/14/2019

## 2019-11-14 NOTE — PROGRESS NOTES
"[facility], [place]   Psychiatric Progress Note  Hospital Day: 11         Interim History:   The patient's care was discussed with the treatment team during the daily team meeting and/or staff's chart notes were reviewed.  Staff report that patient was observed to be responding to internal stimuli. Per staff: \"He was observed by staff to be potentially touching his genitalia and when staff asked him if he would keep his hands away from his pants, he forcefully said, \"Is this really how you want it to go down?\" He also said, \"last time I jerked off.\" Later, he was standing at the  for an extended period of time and when the writer asked if he could move to another area, he yelled, \"you really fucked up this time!\" He had poor ADLs and did not respond well to prompts.\"     Upon interview, the patient was sleeping in his room. Did not wish to meet in interview room. He said \"I'm good\" when asked about appetite, sleep, and mood. He denied AH, SI, and HI. He did not respond to any additional questions.          Medications:        OLANZapine zydis  10 mg Oral At Bedtime           Allergies:   No Known Allergies       Labs:   Recent Results   No results found for this or any previous visit (from the past 24 hour(s)).          Psychiatric Examination:      /53   Pulse 86   Temp 96.9  F (36.1  C) (Oral)   Resp 18   Ht 1.702 m (5' 7\")   Wt 75.3 kg (166 lb)   SpO2 94%   BMI 26.00 kg/m    Weight is 166 lbs 0 oz  Body mass index is 26 kg/m .     Weight over time:      Vitals:     11/03/19 0128   Weight: 75.3 kg (166 lb)             Orthostatic Vitals      None                Cardiometabolic risk assessment. 11/04/19        Reviewed patient profile for cardiometabolic risk factors     Date taken /Value  REFERENCE RANGE   Abdominal Obesity  (Waist Circumference)   See nursing flowsheet Women ?35 in (88 cm)   Men ?40 in (102 cm)       Triglycerides  No results found for: TRIG    " " ?150 mg/dL (1.7 mmol/L) or current treatment for elevated triglycerides   HDL cholesterol  No results found for: HDL]    Women <50 mg/dL (1.3 mmol/L) in women or current treatment for low HDL cholesterol  Men <40 mg/dL (1 mmol/L) in men or current treatment for low HDL cholesterol      Fasting plasma glucose (FPG) No results found for: GLC    FPG ?100 mg/dL (5.6 mmol/L) or treatment for elevated blood glucose   Blood pressure  BP Readings from Last 3 Encounters:   No data found for BP     Blood pressure ?130/85 mmHg or treatment for elevated blood pressure   Family History  See family history      Appearance: Initially asleep but then later awake, alert and disheveled . Subvocalizations. Audible grinding of his teeth.  Attitude:  guarded and somewhat cooperative  Eye Contact:  poor   Mood:  anxious  Affect:  mood congruent, intensity is blunted and guarded  Speech:  unable to assess. Pt was mostly unresponsive. though more communicative since admission  Language: fluent and intact in English  Psychomotor, Gait, Musculoskeletal: Pt swaying back and forth when standing. Grinding teeth repeatedly.  no evidence of tardive dyskinesia, dystonia, or tics  Throught Process:  disorganized and evidence of thought blocking present  Associations:  unable to assess  Thought Content:  patient appears to be responding to internal stimuli  Insight:  limited  Judgement:  limited  Oriented to:  Person. Said that it was \"the 11th month\" of the year. Knew it was 2019. Believed he was in [place], though knew he is in [place].   Attention Span and Concentration:  poor  Recent and Remote Memory:  limited  Fund of Knowledge:  low-normal     Clinical Global Impressions   First:  Considering your total clinical experience with this particular patient population, how severe are the patient's symptoms at this time?: 7 (11/03/19 0907)  Compared to the patient's condition at the START of treatment, this patient's condition is:: 4 (11/03/19 " 0907)  Most recent:  Considering your total clinical experience with this particular patient population, how severe are the patient's symptoms at this time?: 7 (11/03/19 0907)  Compared to the patient's condition at the START of treatment, this patient's condition is:: 4 (11/03/19 0907)             Precautions:           Behavioral Orders   Procedures     Code 1 - Restrict to Unit     Routine Programming       As clinically indicated     Status 15       Every 15 minutes.           Diagnoses:      Schizophrenia, decompensated          Assessment & Plan:      Assessment and hospital summary:  The patient is a 34-year-old man who appears to be psychotic at this point in time. He was brought to ED by police after he was located wandering near a bridge while wearing inappropriate clothing for cold weather. He had blisters and cuts on his feet. We have no known history.  He is completely uncooperative with examination.  He is on a 72-hour hold.  Will petition for MI commitment given he is at high risk for frostbite, hypothermia, or other problems related to exposure given the winter is approaching, has demonstrated a clear inability to care for self, and refusing psychotropic medications despite severe symptoms of psychosis.      Target psychiatric symptoms and interventions:  Resume Zyprexa 10 mg at bedtime to target severe sx of psychosis. Pt may decline.      Medical Problems and Treatments:  Pt continues to refuse foot examination, though denies pain.  No reported medical concerns. Will continue to monitor closely. Will discontinue labs as patient has consistently declined them.     Behavioral/Psychological/Social:  Encourage unit programming     Legal: Court hold.     Safety:  - Continue precautions as noted above  - Status 15 minute checks     Disposition: Pending clinical stabilization. Will likely benefit from discharge to a structured setting.     Ann Marie Woods MD  [facility]iatry

## 2019-11-15 PROCEDURE — 99231 SBSQ HOSP IP/OBS SF/LOW 25: CPT | Performed by: PSYCHIATRY & NEUROLOGY

## 2019-11-15 PROCEDURE — 12400001 ZZH R&B MH UMMC

## 2019-11-15 ASSESSMENT — ACTIVITIES OF DAILY LIVING (ADL)
HYGIENE/GROOMING: PROMPTS
DRESS: INDEPENDENT

## 2019-11-15 NOTE — PROGRESS NOTES
" called unit asking if patient was going to be going to court. Patient was asked if he planned on going to court he, responded saying, \" no\".     "

## 2019-11-15 NOTE — PROGRESS NOTES
"Patient has visible in the milieu for parts of the shift. Continues to keep to himself and doesn't initiate conversation with other patient's or staff. Was staring at himself/reflection through the lounge window and making different types of gestures. No outbursts or irritability this evening. Ate dinner and snack.    Patient evaluation continues. Assessed mood,anxiety,thoughts and behavior.     Patient gradually progressing towards goals.    Patient is encouraged to participate in groups and assisted to develop healthy coping skills.     VS reviewed: /53   Pulse 86   Temp 96.9  F (36.1  C) (Oral)   Resp 18   Ht 1.702 m (5' 7\")   Wt 75.3 kg (166 lb)   SpO2 94%   BMI 26.00 kg/m      Length of stay: 11    Refer to daily team meeting notes for individualized plan of care. Nursing will continue to assess.      "

## 2019-11-15 NOTE — PROGRESS NOTES
"[facility], [place]   Psychiatric Progress Note  Hospital Day: 12        Interim History:   The patient's care was discussed with the treatment team during the daily team meeting and/or staff's chart notes were reviewed.  Staff report that patient was observed to be responding to internal stimuli. He did not exhibit any evidence of agitation/aggressive behaviors overnight. Clinical status is largely unchanged since admission. He is not interacting with peers or staff. Continues to decline medications.     Upon interview, the patient was sleeping in his room. Did not wish to meet in interview room. He said \"I'm good\" when asked about appetite, sleep, and mood. He denied AH, SI, and HI. He did not respond to any additional questions.  He became increasingly irritable and restless with questioning.          Medications:       OLANZapine zydis  10 mg Oral At Bedtime          Allergies:   No Known Allergies       Labs:   No results found for this or any previous visit (from the past 24 hour(s)).       Psychiatric Examination:     /53   Pulse 86   Temp 96.9  F (36.1  C) (Oral)   Resp 18   Ht 1.702 m (5' 7\")   Wt 75.3 kg (166 lb)   SpO2 94%   BMI 26.00 kg/m    Weight is 166 lbs 0 oz  Body mass index is 26 kg/m .    Weight over time:  Vitals:    11/03/19 0128   Weight: 75.3 kg (166 lb)       Orthostatic Vitals     None            Cardiometabolic risk assessment. 11/04/19      Reviewed patient profile for cardiometabolic risk factors    Date taken /Value  REFERENCE RANGE   Abdominal Obesity  (Waist Circumference)   See nursing flowsheet Women ?35 in (88 cm)   Men ?40 in (102 cm)      Triglycerides  No results found for: TRIG    ?150 mg/dL (1.7 mmol/L) or current treatment for elevated triglycerides   HDL cholesterol  No results found for: HDL]   Women <50 mg/dL (1.3 mmol/L) in women or current treatment for low HDL cholesterol  Men <40 mg/dL (1 mmol/L) in men or current treatment for " "low HDL cholesterol     Fasting plasma glucose (FPG) No results found for: GLC   FPG ?100 mg/dL (5.6 mmol/L) or treatment for elevated blood glucose   Blood pressure  BP Readings from Last 3 Encounters:   No data found for BP    Blood pressure ?130/85 mmHg or treatment for elevated blood pressure   Family History  See family history     Appearance: Initially asleep but then later awake, alert and disheveled . Subvocalizations. Audible grinding of his teeth.  Attitude:  guarded and somewhat cooperative  Eye Contact:  poor   Mood:  anxious  Affect:  mood congruent, intensity is blunted and guarded  Speech:  unable to assess. Pt was mostly unresponsive. though more communicative since admission  Language: fluent and intact in English  Psychomotor, Gait, Musculoskeletal: Pt swaying back and forth when standing. Grinding teeth repeatedly.  no evidence of tardive dyskinesia, dystonia, or tics  Throught Process:  disorganized and evidence of thought blocking present  Associations:  unable to assess  Thought Content:  patient appears to be responding to internal stimuli  Insight:  limited  Judgement:  limited  Oriented to:  Person. Said that it was \"the 11th month\" of the year. Knew it was 2019. Believed he was in [place], though knew he is in [place].   Attention Span and Concentration:  poor  Recent and Remote Memory:  limited  Fund of Knowledge:  low-normal    Clinical Global Impressions   First:  Considering your total clinical experience with this particular patient population, how severe are the patient's symptoms at this time?: 7 (11/03/19 0907)  Compared to the patient's condition at the START of treatment, this patient's condition is:: 4 (11/03/19 0907)  Most recent:  Considering your total clinical experience with this particular patient population, how severe are the patient's symptoms at this time?: 7 (11/03/19 0907)  Compared to the patient's condition at the START of treatment, this patient's condition is:: 4 " (11/03/19 0907)           Precautions:     Behavioral Orders   Procedures     Code 1 - Restrict to Unit     Routine Programming     As clinically indicated     Status 15     Every 15 minutes.          Diagnoses:     Schizophrenia, decompensated         Assessment & Plan:     Assessment and hospital summary:  The patient is a 34-year-old man who appears to be psychotic at this point in time. He was brought to ED by police after he was located wandering [place] a bridge while wearing inappropriate clothing for cold weather. He had blisters and cuts on his feet. We have no known history.  He is completely uncooperative with examination.  He is on a 72-hour hold.  Will petition for MI commitment given he is at high risk for frostbite, hypothermia, or other problems related to exposure given the winter is approaching, has demonstrated a clear inability to care for self, and refusing psychotropic medications despite severe symptoms of psychosis.     Target psychiatric symptoms and interventions:  Resume Zyprexa 10 mg at bedtime to target severe sx of psychosis. Pt may decline. Awaiting final court order and [place].     Medical Problems and Treatments:  Pt continues to refuse foot examination, though denies pain.  No reported medical concerns. Will continue to monitor closely. Will discontinue labs as patient has consistently declined them.    Behavioral/Psychological/Social:  Encourage unit programming    Legal: Court hold. Final hearing was today.     Safety:  - Continue precautions as noted above  - Status 15 minute checks    Disposition: Pending clinical stabilization. Will likely benefit from discharge to a structured setting.     Ann Marie Woods MD  [facility] Psychiatry

## 2019-11-15 NOTE — PROGRESS NOTES
Pt refused vital signs       11/15/19 1643   Vital Signs   Temp   (pt refused)   Pulse   (pt refused)

## 2019-11-16 PROCEDURE — 12400001 ZZH R&B MH UMMC

## 2019-11-16 ASSESSMENT — ACTIVITIES OF DAILY LIVING (ADL)
ORAL_HYGIENE: PROMPTS
HYGIENE/GROOMING: PROMPTS
DRESS: SCRUBS (BEHAVIORAL HEALTH);PROMPTS
LAUNDRY: UNABLE TO COMPLETE

## 2019-11-16 NOTE — PLAN OF CARE
Hermann had a typical evening on the unit. He stood about two feet aways from the coffee carafes for over an hour, nodding, responding to internal stimuli. He then moved to an area across from the desk and watched staff at the desk from this spot for about an hour, nodding at times. He did he his meal. No interactions noted with staff or peers. Declined night time meds.

## 2019-11-16 NOTE — PROGRESS NOTES
11/16/19 1400   Behavioral Health   Hallucinations auditory;visual;appears responding;other (see comment)  (Pt makes threatning lunges at V.H. while mouthing agg. speec)   Thinking confused;distractable   Judgement impaired   Eye Contact into space   Affect irritable   Physical Appearance/Attire posture rigid   Activity other (see comment)  (Standing in dinning room exclusivly. Only had 1 coffee.)   Speech other (see comments)  (Pt. declined redirection.)   Medication Sensitivity no stated side effects;no observed side effects   Psychomotor / Gait balanced;steady

## 2019-11-17 PROCEDURE — 12400001 ZZH R&B MH UMMC

## 2019-11-17 ASSESSMENT — ACTIVITIES OF DAILY LIVING (ADL)
DRESS: SCRUBS (BEHAVIORAL HEALTH);PROMPTS
LAUNDRY: UNABLE TO COMPLETE
ORAL_HYGIENE: PROMPTS
HYGIENE/GROOMING: PROMPTS

## 2019-11-17 NOTE — PROGRESS NOTES
Early in the shift staff heard loud noises coming from pt's room.  Pt noted sitting on the edge of his bed loudly clapping his hands against his stomach repeatedly.  Three different times during rounds, pt noted counting loudly to self in his room. Pt noted awake in his room until 0345.  Pt has continued to appear asleep since 0345; appears to have only slept 3.5 hrs this shift.  Will continue to monitor and support plan of care.

## 2019-11-17 NOTE — PROGRESS NOTES
Hermann spent most of the evening in the lounge and dinning area loitering, staring, posturing, rocking side to side, and displaying what appeared to be psychogenic movements.  He appears to be actively responding to internal stimuli, intensely talking to the window accompanied by threatening gestures.  Several times throughout the evening other patients complained about feeling uncomfortable in his presence. He was was observed grabbing the front of his pants, sometimes holding them up or grabbing near his genitales.  On approach he appears to be thought blocking, paranoid, suspicious and guarded, oftentimes giving minimal responses to questions.  He appears disheveled in appearance, malodorous, and unfortunately refuses to shower or change his hospital scrubs.  He continues to refuse his scheduled medications.  He currently denies SI.         11/16/19 2135   Sleep/Rest/Relaxation   Day/Evening Time Hours napping;resting in bed   Number of hours napping 1 hours   Number of hours resting in bed 1 hours   Behavioral Health   Hallucinations auditory;visual;appears responding   Thinking confused;distractable;paranoid;poor concentration   Orientation person: oriented   Memory other (see comment)  (ZAHRA)   Insight poor   Judgement impaired   Eye Contact into space;staring   Affect tense;irritable   Mood other (see comments)  (Appears dysphoric.)   Physical Appearance/Attire posture rigid;untidy;disheveled   Hygiene neglected grooming - unclean body, hair, teeth   Suicidality other (see comments)  (Currently denies SI.)   1. Wish to be Dead (Recent) No   2. Non-Specific Active Suicidal Thoughts (Recent) No   Activity hyperactive (agitated, impulsive);restless  (Loitering in the commons with psychogenic movements.)   Speech other (see comments)  (Restricted, guarded, minimal responses. )   Medication Sensitivity no stated side effects;no observed side effects   Psychomotor / Gait balanced;steady   Overt Aggression Scale    Verbal Aggression 0   Aggression against Property 0   Auto-Aggression 0   Physical Aggression 0   Overt Aggression Total Score 0   Coping/Psychosocial Interventions   Supportive Measures active listening utilized;counseling provided;verbalization of feelings encouraged   Activities of Daily Living   Hygiene/Grooming prompts   Oral Hygiene prompts   Dress scrubs (behavioral health);prompts   Laundry unable to complete   Room Organization unable

## 2019-11-17 NOTE — PLAN OF CARE
Pt in the lounge area, no interaction with peers or staff.  Pt ate meals in the lounge.  No agitation noted this shift.  Insight appears limited and pt continues to appear responding to internal stimuli.  Will continue to monitor pt closely.       I

## 2019-11-17 NOTE — PROGRESS NOTES
Hermann refused his vital signs this afternoon. RN notified.        11/17/19 9355   Vital Signs   Temp   (Refused)   Pulse   (Refused)   BP   (Refused)

## 2019-11-18 PROCEDURE — 99233 SBSQ HOSP IP/OBS HIGH 50: CPT | Performed by: PSYCHIATRY & NEUROLOGY

## 2019-11-18 PROCEDURE — 25000132 ZZH RX MED GY IP 250 OP 250 PS 637: Mod: GY | Performed by: PSYCHIATRY & NEUROLOGY

## 2019-11-18 PROCEDURE — 12400001 ZZH R&B MH UMMC

## 2019-11-18 RX ORDER — OLANZAPINE 10 MG/2ML
10 INJECTION, POWDER, FOR SOLUTION INTRAMUSCULAR AT BEDTIME
Status: DISCONTINUED | OUTPATIENT
Start: 2019-11-18 | End: 2019-11-27

## 2019-11-18 RX ORDER — OLANZAPINE 10 MG/1
10 TABLET, ORALLY DISINTEGRATING ORAL AT BEDTIME
Status: DISCONTINUED | OUTPATIENT
Start: 2019-11-18 | End: 2019-11-27

## 2019-11-18 RX ADMIN — OLANZAPINE 10 MG: 10 TABLET, ORALLY DISINTEGRATING ORAL at 23:19

## 2019-11-18 ASSESSMENT — ACTIVITIES OF DAILY LIVING (ADL)
LAUNDRY: WITH SUPERVISION
DRESS: INDEPENDENT
ORAL_HYGIENE: INDEPENDENT
HYGIENE/GROOMING: INDEPENDENT
DRESS: INDEPENDENT
HYGIENE/GROOMING: INDEPENDENT
LAUNDRY: WITH SUPERVISION
ORAL_HYGIENE: INDEPENDENT

## 2019-11-18 NOTE — PROGRESS NOTES
"[facility], [place]   Psychiatric Progress Note  Hospital Day: 15        Interim History:   The patient's care was discussed with the treatment team during the daily team meeting and/or staff's chart notes were reviewed.  Staff report that patient was observed to be responding to internal stimuli. He did not exhibit any evidence of agitation/aggressive behaviors overnight. Clinical status is largely unchanged since admission. He is not interacting with peers or staff. Continues to decline medications. Appears agitated at time when also responding to internal stimuli.     Upon interview, the patient was awake in the lounge. Did not wish to meet in interview room. He said \"I'm good\" when asked about appetite, sleep, and mood. He denied AH, SI, and HI. He did not respond to any additional questions.  He became increasingly irritable and restless with questioning. He was informed that he would be offered either the oral or IM injection of Zyprexa per court order. He replied \"I'm good\" while grinding his teeth.          Medications:       OLANZapine zydis  10 mg Oral At Bedtime          Allergies:   No Known Allergies       Labs:   No results found for this or any previous visit (from the past 24 hour(s)).       Psychiatric Examination:     /53   Pulse 86   Temp 96.9  F (36.1  C) (Oral)   Resp 18   Ht 1.702 m (5' 7\")   Wt 75.3 kg (166 lb)   SpO2 94%   BMI 26.00 kg/m    Weight is 166 lbs 0 oz  Body mass index is 26 kg/m .    Weight over time:  Vitals:    11/03/19 0128   Weight: 75.3 kg (166 lb)       Orthostatic Vitals     None            Cardiometabolic risk assessment. 11/04/19      Reviewed patient profile for cardiometabolic risk factors    Date taken /Value  REFERENCE RANGE   Abdominal Obesity  (Waist Circumference)   See nursing flowsheet Women ?35 in (88 cm)   Men ?40 in (102 cm)      Triglycerides  No results found for: TRIG    ?150 mg/dL (1.7 mmol/L) or current treatment " "for elevated triglycerides   HDL cholesterol  No results found for: HDL]   Women <50 mg/dL (1.3 mmol/L) in women or current treatment for low HDL cholesterol  Men <40 mg/dL (1 mmol/L) in men or current treatment for low HDL cholesterol     Fasting plasma glucose (FPG) No results found for: GLC   FPG ?100 mg/dL (5.6 mmol/L) or treatment for elevated blood glucose   Blood pressure  BP Readings from Last 3 Encounters:   No data found for BP    Blood pressure ?130/85 mmHg or treatment for elevated blood pressure   Family History  See family history     Appearance: Initially asleep but then later awake, alert and disheveled. Subvocalizations. Audible grinding of his teeth.  Attitude:  guarded and mostly uncooperative  Eye Contact:  poor   Mood:  anxious  Affect:  mood congruent, intensity is blunted and guarded  Speech:  unable to assess. Pt was mostly unresponsive. though more communicative since admission  Language: fluent and intact in English  Psychomotor, Gait, Musculoskeletal: Pt swaying back and forth when standing. Grinding teeth repeatedly.  no evidence of tardive dyskinesia, dystonia, or tics  Throught Process:  disorganized and evidence of thought blocking present  Associations:  unable to assess  Thought Content:  patient appears to be responding to internal stimuli  Insight:  limited  Judgement:  limited  Oriented to:  Person. Said that it was \"the 11th month\" of the year. Knew it was 2019. Believed he was in [place], though knew he is in [place].   Attention Span and Concentration:  poor  Recent and Remote Memory:  limited  Fund of Knowledge:  low-normal    Clinical Global Impressions   First:  Considering your total clinical experience with this particular patient population, how severe are the patient's symptoms at this time?: 7 (11/03/19 0907)  Compared to the patient's condition at the START of treatment, this patient's condition is:: 4 (11/03/19 0907)  Most recent:  Considering your total clinical " experience with this particular patient population, how severe are the patient's symptoms at this time?: 7 (11/03/19 0907)  Compared to the patient's condition at the START of treatment, this patient's condition is:: 4 (11/03/19 0907)           Precautions:     Behavioral Orders   Procedures     Code 1 - Restrict to Unit     Routine Programming     As clinically indicated     Status 15     Every 15 minutes.          Diagnoses:     Schizophrenia, decompensated         Assessment & Plan:     Assessment and hospital summary:  The patient is a 34-year-old man who appears to be psychotic at this point in time. He was brought to ED by police after he was [place] while wearing inappropriate clothing for cold weather. He had blisters and cuts on his feet. We have no known history.  He is completely uncooperative with examination.  He is on a 72-hour hold.  Will petition for MI commitment given he is at high risk for frostbite, hypothermia, or other problems related to exposure given the winter is approaching, has demonstrated a clear inability to care for self, and refusing psychotropic medications despite severe symptoms of psychosis.     Target psychiatric symptoms and interventions:  Resume Zyprexa 10 mg at bedtime to target severe sx of psychosis. Will backup as [place] is now in place.     Medical Problems and Treatments:  Pt continues to refuse foot examination, though denies pain.  No reported medical concerns. Will continue to monitor closely. Will discontinue labs as patient has consistently declined them.    Behavioral/Psychological/Social:  Encourage unit programming    Legal: Committed with [facility]    Safety:  - Continue precautions as noted above  - Status 15 minute checks    Disposition: Pending clinical stabilization. Will likely benefit from discharge to a structured setting.     Ann Marie Woods MD  [facility] Psychiatry

## 2019-11-18 NOTE — PLAN OF CARE
"Pt was isolative and withdrawn to his room this evening. Pt refused vitals signs, was encouraged to attend community meeting, but did not, and was only minimally conversive/responsive to staff. Writer approached pt while in his bed. Pt is visibly restless and tense. He was approached laying on his side, moving both ankles in a circular motion, while thrusting hips forward and backward repeatedly. Pt responded with single words ultimately denying anxiety, depression, SI, SIB, HI, and hallucinations. Writer encouraged to take his 10 mg Zyprexa after stating observations of perceived anxiety and restlessness stating that this medication would help with his symptoms. Pt only stated, \"I'm good.\" Writer approached pt with both ODT and oral version of medication offering him choices, he still responded with, \"I'm good.\" Writer offered medication in apple sauce, pt still refused. Writer put ODT medication on spoon and touched it to pt's lips. Pt pulled blanket over mouth and stated, \"I'm good.\" Writer attempted to clarify with patient asking the reason behind not wanting to take the medication and pt stopped responded to writer all together.     Pt remained in his bed in his room the entirety of this evening shift. Pt would not come out for dinner, so his tray was brought to him and he did eat everything on tray.   "

## 2019-11-18 NOTE — PLAN OF CARE
"BEHAVIORAL TEAM DISCUSSION    Participants: Ann Marie Woods MD; [facility]; Carmelina Ramos RN     Progress: Patient is on day 15 of hospital stay.  Pt remains due to being under involuntary civil commitment with [facility] order, which were received this morning via fax.  Pt also continues to be symptomatic to the point that he is not ability to adequately care for self or keep self safe.  Pt continues to refuse medications.  He continues to stand around in milieu for hours staring intensely.  At times he is staring at patients or staff.  He has also been observed to have his hands in his pants near his genitals.  He has also been observed to mumble to himself and posture towards the internal stimuli he is responding to.  Pt will stand by the coffee area, but mostly standing with an empty cup.  He is unable to engage in meaningful conversation, but has denied all mental health symptoms and reports to being \"fine\".  His appetite is adequate.  He is receiving less hours of sleep compared to when he was first admitted.   Over weekend staff also noted that he was heard in his room counting to himself.  He has poor ADLs and refuses to shower or change scrubs when asked by staff.      Anticipated length of stay: unknown.    Continued Stay Criteria/Rationale: t remains due to being under involuntary civil commitment with [facility] order, which were received this morning via fax.  Pt also continues to be symptomatic to the point that he is not ability to adequately care for self or keep self safe.  Pt continues to refuse medications.  He continues to stand around in milieu for hours staring intensely.  At times he is staring at patients or staff.  He has also been observed to have his hands in his pants near his genitals.  He has also been observed to mumble to himself and posture towards the internal stimuli he is responding to.  Pt will stand by the coffee area, but mostly standing with an empty cup. "     Medical/Physical: no acute issues    Precautions:   Behavioral Orders   Procedures    Code 1 - Restrict to Unit    Routine Programming     As clinically indicated    Status 15     Every 15 minutes.     Plan: Target psychiatric symptoms and interventions:  Resume Zyprexa 10 mg at bedtime to target severe sx of psychosis. Pt may decline. [facility]man order received and neuroleptics can now be forced.      Medical Problems and Treatments:  Pt continues to refuse foot examination, though denies pain.  No reported medical concerns. Will continue to monitor closely. Will discontinue labs as patient has consistently declined them.     Behavioral/Psychological/Social:  Encourage unit programming     Legal: Full MI civil commitment with [facility]man Order     Safety:  - Continue precautions as noted above  - Status 15 minute checks     Disposition: Pending clinical stabilization. Will likely benefit from discharge to a structured setting.     Rationale for change in precautions or plan: [facility] order approved for Zyprexa, Risperdal, Clozaril, and Haldol.

## 2019-11-18 NOTE — PROGRESS NOTES
"Legal Documentation Note    Important Case Details  Involved County: [place]   Court File #: [id number]  Type of Order: Order for Commitment As A Person Who Is Mentally Ill; Order Authorizing Use of Neuroleptic Medication   Effective Date: 11/15/2019  Date of Expiration: 05/15/2020  Assigned [facility] Worker/:   [facility] Worker/ Info:     Order Specifics  Date Order Received:11/18/19  Method of Receipt: fax at 8:25am    Details of Court Order  Order for Commitment As A Person Who Is Mentally Ill  1. Patient is committed as a person who is mentally ill to the head of [facility] and the Commissioner of Human Services.    5. This commitment will terminate without further order of the court on May 15, 2020 unless it has been terminated or has been continued...before that date.    Order Authorizing Use of Neuroleptic Medication   12. Petitioner propose to use Zyprexa (olanzapine), Risperdal (risperidone), Clozaril (clozapine) and/or Haldol (haloperidol), which are FDA approved neuroleptic medications, in medically indicated dosages, as determined by treating physician...    13. The method of administration of these medications--orally or by injection--will be determined by the treating physician.  Respondent should experience no pain other than the \"needle stick\" of an injection, which should be no greater than pain from commonly administered injections of any other medicine or vaccine.     Did patient receive a copy? Yes  Was treatment team notified?Yes      "

## 2019-11-19 PROCEDURE — 12400001 ZZH R&B MH UMMC

## 2019-11-19 PROCEDURE — 25000132 ZZH RX MED GY IP 250 OP 250 PS 637: Mod: GY | Performed by: PSYCHIATRY & NEUROLOGY

## 2019-11-19 PROCEDURE — 99232 SBSQ HOSP IP/OBS MODERATE 35: CPT | Performed by: PSYCHIATRY & NEUROLOGY

## 2019-11-19 RX ADMIN — OLANZAPINE 10 MG: 10 TABLET, ORALLY DISINTEGRATING ORAL at 22:28

## 2019-11-19 NOTE — PROGRESS NOTES
"[facility], [place]   Psychiatric Progress Note  Hospital Day: 16        Interim History:   The patient's care was discussed with the treatment team during the daily team meeting and/or staff's chart notes were reviewed.  Staff report that patient was observed to be responding to internal stimuli. He did not exhibit any evidence of agitation/aggressive behaviors overnight. Clinical status is largely unchanged since admission. He is not interacting with peers or staff. Continues to decline medications. Appears agitated at time when also responding to internal stimuli. Accepted oral Zyprexa last evening following Code Green.     Upon interview, the patient was lying in his bed. He immediately said \"I am fine. Leave me alone.\" He did not respond to additional questions, and then raised his voice saying \"Get out.\" Writer then terminated interview due to concerns that patient may continue to escalate.        Medications:       OLANZapine zydis  10 mg Oral At Bedtime    Or     OLANZapine  10 mg Intramuscular At Bedtime          Allergies:   No Known Allergies       Labs:   No results found for this or any previous visit (from the past 24 hour(s)).       Psychiatric Examination:     /53   Pulse 86   Temp 96.9  F (36.1  C) (Oral)   Resp 18   Ht 1.702 m (5' 7\")   Wt 75.3 kg (166 lb)   SpO2 94%   BMI 26.00 kg/m    Weight is 166 lbs 0 oz  Body mass index is 26 kg/m .    Weight over time:  Vitals:    11/03/19 0128   Weight: 75.3 kg (166 lb)       Orthostatic Vitals     None            Cardiometabolic risk assessment. 11/04/19      Reviewed patient profile for cardiometabolic risk factors    Date taken /Value  REFERENCE RANGE   Abdominal Obesity  (Waist Circumference)   See nursing flowsheet Women ?35 in (88 cm)   Men ?40 in (102 cm)      Triglycerides  No results found for: TRIG    ?150 mg/dL (1.7 mmol/L) or current treatment for elevated triglycerides   HDL cholesterol  No results " "found for: HDL]   Women <50 mg/dL (1.3 mmol/L) in women or current treatment for low HDL cholesterol  Men <40 mg/dL (1 mmol/L) in men or current treatment for low HDL cholesterol     Fasting plasma glucose (FPG) No results found for: GLC   FPG ?100 mg/dL (5.6 mmol/L) or treatment for elevated blood glucose   Blood pressure  BP Readings from Last 3 Encounters:   No data found for BP    Blood pressure ?130/85 mmHg or treatment for elevated blood pressure   Family History  See family history     Appearance: Initially asleep but then later awake, alert and disheveled. Subvocalizations. Audible grinding of his teeth.  Attitude:  guarded and mostly uncooperative  Eye Contact:  poor   Mood:  anxious  Affect:  mood congruent, intensity is blunted and guarded  Speech:  unable to assess. Pt was mostly unresponsive. though more communicative since admission  Language: fluent and intact in English  Psychomotor, Gait, Musculoskeletal: Pt swaying back and forth when standing. Grinding teeth repeatedly.  no evidence of tardive dyskinesia, dystonia, or tics  Throught Process:  disorganized and evidence of thought blocking present  Associations:  unable to assess  Thought Content:  patient appears to be responding to internal stimuli  Insight:  limited  Judgement:  limited  Oriented to:  Person. Said that it was \"the 11th month\" of the year. Knew it was 2019. Believed he was in [place], though knew he is in [place].   Attention Span and Concentration:  poor  Recent and Remote Memory:  limited  Fund of Knowledge:  low-normal    Clinical Global Impressions   First:  Considering your total clinical experience with this particular patient population, how severe are the patient's symptoms at this time?: 7 (11/03/19 0907)  Compared to the patient's condition at the START of treatment, this patient's condition is:: 4 (11/03/19 0907)  Most recent:  Considering your total clinical experience with this particular patient population, how severe " are the patient's symptoms at this time?: 7 (11/03/19 0907)  Compared to the patient's condition at the START of treatment, this patient's condition is:: 4 (11/03/19 0907)           Precautions:     Behavioral Orders   Procedures     Code 1 - Restrict to Unit     Routine Programming     As clinically indicated     Status 15     Every 15 minutes.          Diagnoses:     Schizophrenia, decompensated         Assessment & Plan:     Assessment and hospital summary:  The patient is a 34-year-old man who appears to be psychotic at this point in time. He was brought to ED by police after he was located wandering near a bridge while wearing inappropriate clothing for cold weather. He had blisters and cuts on his feet. We have no known history.  He is completely uncooperative with examination.  He is on a 72-hour hold.  Will petition for MI commitment given he is at high risk for frostbite, hypothermia, or other problems related to exposure given the winter is approaching, has demonstrated a clear inability to care for self, and refusing psychotropic medications despite severe symptoms of psychosis.     Target psychiatric symptoms and interventions:  Resume Zyprexa 10 mg at bedtime to target severe sx of psychosis. Will backup as Redman is now in place.     Medical Problems and Treatments:  Pt continues to refuse foot examination, though denies pain.  No reported medical concerns. Will continue to monitor closely. Will discontinue labs as patient has consistently declined them.    Behavioral/Psychological/Social:  Encourage unit programming    Legal: Committed with [facility]    Safety:  - Continue precautions as noted above  - Status 15 minute checks    Disposition: Pending clinical stabilization. Will likely benefit from discharge to a structured setting.     Ann Marie Woods MD  [facility]

## 2019-11-19 NOTE — PROGRESS NOTES
patient declined a check in, denying any problems. He spent most of the shift either sleeping or watching people in the yun. He demanded the doctor leave his room earlier in the shift, but has not presented any signs of aggression.

## 2019-11-19 NOTE — PROGRESS NOTES
Code green called. Patient was offered his zyprexa zydis. Patient glared and stared at staff then stated he would take it.  Took it without issue.

## 2019-11-19 NOTE — PROGRESS NOTES
"Patient has spent the entire shift in the lounge area. Continues to  place staring out the window and rocks back and forth. Patient ate dinner and snack. Patient doesn't talk with anyone on the unit. Patient needed to be redirected away from other patients because he was in there space. Patient continues to have poor Adl's and scratches himself and puts his hand down his pants while in lounge. Patient was med compliant, but appeared suspicious of writer and other staff.     Patient evaluation continues. Assessed mood,anxiety,thoughts and behavior.     Patient  progressing towards goals.    Patient is encouraged to participate in groups and assisted to develop healthy coping skills.     VS reviewed: /53   Pulse 86   Temp 96.9  F (36.1  C) (Oral)   Resp 18   Ht 1.702 m (5' 7\")   Wt 75.3 kg (166 lb)   SpO2 94%   BMI 26.00 kg/m      Length of stay: 15    Refer to daily team meeting notes for individualized plan of care. Nursing will continue to assess.      "

## 2019-11-20 PROCEDURE — 25000132 ZZH RX MED GY IP 250 OP 250 PS 637: Performed by: PSYCHIATRY & NEUROLOGY

## 2019-11-20 PROCEDURE — 12400001 ZZH R&B MH UMMC

## 2019-11-20 RX ADMIN — LORAZEPAM 2 MG: 1 TABLET ORAL at 21:08

## 2019-11-20 RX ADMIN — OLANZAPINE 10 MG: 10 TABLET, ORALLY DISINTEGRATING ORAL at 21:06

## 2019-11-20 RX ADMIN — LORAZEPAM 2 MG: 1 TABLET ORAL at 16:51

## 2019-11-20 NOTE — PROGRESS NOTES
"Patient initially shook his head no when offering him his HS medication. Patient was reminded that he had to take it. Patient took it with reluctance, mouth check done. Patient as writer and second RN were exiting the door yelled, \" fuck you\"!  "

## 2019-11-20 NOTE — ACP (ADVANCE CARE PLANNING)
Pt slept until lunch and then spent the rest of the shift in dinning area either sitting or standing in one spot for hours. He was calm with blunted, flat mood affect. He denied to check-in and therefore this writer unable to assess the patient.

## 2019-11-20 NOTE — PROGRESS NOTES
[facility] near other patients and punching in the air and swearing to himself. Patient was offered prn medication but yelled at writer that they needed to go back in the room. Code green called. Patient was again offered prn oral ativan or IM Zyprexa. Took oral ativan without issue.

## 2019-11-20 NOTE — PROGRESS NOTES
Pt slept until dinner and then stood in the yun and dining room until 2100. Pt stood in the same spot for hours with a flat and blunted affect. Pt would periodically put his hands in his pants touching his genitalia and when prompted to stop, pt complied yet would continue the behavior again.   Pt has not spoken to staff or peers and was unable to assess.      11/19/19 2221   Behavioral Health   Hallucinations other (see comment)  (teddy)   Thinking confused;distractable;poor concentration   Orientation   (teddy)   Memory other (see comment)  (teddy)   Insight poor   Judgement impaired   Eye Contact at examiner;into space   Affect blunted, flat;tense   Mood mood is calm   Physical Appearance/Attire disheveled   1. Wish to be Dead (Recent)   (teddy)   2. Non-Specific Active Suicidal Thoughts (Recent)   (teddy)

## 2019-11-20 NOTE — PROGRESS NOTES
Hermann refused his vital signs this afternoon. RN notified.        11/20/19 4144   Vital Signs   Temp   (Refused)   Pulse   (Refused)   BP   (Refused)

## 2019-11-21 PROCEDURE — 99232 SBSQ HOSP IP/OBS MODERATE 35: CPT | Performed by: PSYCHIATRY & NEUROLOGY

## 2019-11-21 PROCEDURE — 12400001 ZZH R&B MH UMMC

## 2019-11-21 PROCEDURE — 25000132 ZZH RX MED GY IP 250 OP 250 PS 637: Performed by: PSYCHIATRY & NEUROLOGY

## 2019-11-21 RX ADMIN — OLANZAPINE 10 MG: 10 TABLET, ORALLY DISINTEGRATING ORAL at 20:36

## 2019-11-21 ASSESSMENT — ACTIVITIES OF DAILY LIVING (ADL)
ORAL_HYGIENE: INDEPENDENT;PROMPTS
DRESS: INDEPENDENT
HYGIENE/GROOMING: PROMPTS
HYGIENE/GROOMING: INDEPENDENT;PROMPTS
DRESS: SCRUBS (BEHAVIORAL HEALTH);INDEPENDENT;PROMPTS

## 2019-11-21 NOTE — PROGRESS NOTES
"Patient in the early part of the shift was irritable and hostile and not interacting with peers or staff. Code green called and prn Ativan was given. Patient was observed around 1930 in the [facility] area, socializing with another patient playing cards and chatting. Patient after playing cards for about an hour got up and started to rock back and forth and making hand gestures and talking to himself again. Writer asked patient to come to [employer], which he did. Patient was asked if the medication given earlier was beneficial he stated, \" its okay\". Patient was asked if he would like to take another dose, he stated yes. Patient took prn Ativan 2 mg again and his HS Zyprexa.      Adl's continue to be poor.     Patient evaluation continues. Assessed mood,anxiety,thoughts and behavior.     Patient gradually progressing towards goals.    Patient is encouraged to participate in groups and assisted to develop healthy coping skills.     VS reviewed: /53   Pulse 86   Temp 96.9  F (36.1  C) (Oral)   Resp 18   Ht 1.702 m (5' 7\")   Wt 75.3 kg (166 lb)   SpO2 94%   BMI 26.00 kg/m      Length of stay: 17    Refer to daily team meeting notes for individualized plan of care. Nursing will continue to assess.      "

## 2019-11-21 NOTE — PROGRESS NOTES
"[facility], [place]   Psychiatric Progress Note  Hospital Day: 18        Interim History:   The patient's care was discussed with the treatment team during the daily team meeting and/or staff's chart notes were reviewed.  Staff report that patient was agitated last evening, punching the air and swearing to himself. Frightening other patients on the unit. Offered and accepted prn Ativan after Code Green was called. No further episodes of agitation. Pt noted partial improvement in symptoms following administration of Ativan. Appears to be responding to internal stimuli. Not engaging in unit programming. Thought process is disorganized. Not attending to ADLs.     Upon interview, the patient was lying in his bed. He immediately said \"I am fine. I am good.\" He said that he does not feel medications have been helpful when asked. Denied side effects. Patient said he has never been on Zyprexa. Denied medical concerns. Denied SI/HI/AH/VH.          Medications:       OLANZapine zydis  10 mg Oral At Bedtime    Or     OLANZapine  10 mg Intramuscular At Bedtime          Allergies:   No Known Allergies       Labs:   No results found for this or any previous visit (from the past 24 hour(s)).       Psychiatric Examination:     /53   Pulse 86   Temp 96.9  F (36.1  C) (Oral)   Resp 18   Ht 1.702 m (5' 7\")   Wt 75.3 kg (166 lb)   SpO2 94%   BMI 26.00 kg/m    Weight is 166 lbs 0 oz  Body mass index is 26 kg/m .    Weight over time:  Vitals:    11/03/19 0128   Weight: 75.3 kg (166 lb)       Orthostatic Vitals     None            Cardiometabolic risk assessment. 11/04/19      Reviewed patient profile for cardiometabolic risk factors    Date taken /Value  REFERENCE RANGE   Abdominal Obesity  (Waist Circumference)   See nursing flowsheet Women ?35 in (88 cm)   Men ?40 in (102 cm)      Triglycerides  No results found for: TRIG    ?150 mg/dL (1.7 mmol/L) or current treatment for elevated " triglycerides   HDL cholesterol  No results found for: HDL]   Women <50 mg/dL (1.3 mmol/L) in women or current treatment for low HDL cholesterol  Men <40 mg/dL (1 mmol/L) in men or current treatment for low HDL cholesterol     Fasting plasma glucose (FPG) No results found for: GLC   FPG ?100 mg/dL (5.6 mmol/L) or treatment for elevated blood glucose   Blood pressure  BP Readings from Last 3 Encounters:   No data found for BP    Blood pressure ?130/85 mmHg or treatment for elevated blood pressure   Family History  See family history     Appearance: Initially asleep but then later awake, alert and disheveled. Subvocalizations. Audible grinding of his teeth.  Attitude:  guarded and mostly uncooperative  Eye Contact:  poor   Mood:  anxious  Affect:  mood congruent, intensity is blunted and guarded  Speech:  unable to assess. Pt was mostly unresponsive. though more communicative since admission  Language: fluent and intact in English  Psychomotor, Gait, Musculoskeletal: Pt swaying back and forth when standing. Grinding teeth repeatedly.  no evidence of tardive dyskinesia, dystonia, or tics  Throught Process:  disorganized and evidence of thought blocking present  Associations:  unable to assess  Thought Content:  patient appears to be responding to internal stimuli  Insight:  limited  Judgement:  limited  Oriented to:  Person and place   Attention Span and Concentration:  poor  Recent and Remote Memory:  limited  Fund of Knowledge:  low-normal    Clinical Global Impressions   First:  Considering your total clinical experience with this particular patient population, how severe are the patient's symptoms at this time?: 7 (11/03/19 0907)  Compared to the patient's condition at the START of treatment, this patient's condition is:: 4 (11/03/19 0907)  Most recent:  Considering your total clinical experience with this particular patient population, how severe are the patient's symptoms at this time?: 7 (11/03/19  0907)  Compared to the patient's condition at the START of treatment, this patient's condition is:: 4 (11/03/19 0907)           Precautions:     Behavioral Orders   Procedures     Code 1 - Restrict to Unit     Routine Programming     As clinically indicated     Status 15     Every 15 minutes.          Diagnoses:     Schizophrenia, decompensated         Assessment & Plan:     Assessment and hospital summary:  The patient is a 34-year-old man who appears to be psychotic at this point in time. He was brought to ED by police after he was located wandering near a bridge while wearing inappropriate clothing for cold weather. He had blisters and cuts on his feet. We have no known history.  He is completely uncooperative with examination.  He is on a 72-hour hold.  Will petition for MI commitment given he is at high risk for frostbite, hypothermia, or other problems related to exposure given the winter is approaching, has demonstrated a clear inability to care for self, and refusing psychotropic medications despite severe symptoms of psychosis.     Target psychiatric symptoms and interventions:  Resume Zyprexa 10 mg at bedtime to target severe sx of psychosis. Will backup as Redman is now in place.     Medical Problems and Treatments:  Pt continues to refuse foot examination, though denies pain.  No reported medical concerns. Will continue to monitor closely. Will discontinue labs as patient has consistently declined them.    Behavioral/Psychological/Social:  Encourage unit programming    Legal: Committed with [facility]    Safety:  - Continue precautions as noted above  - Status 15 minute checks    Disposition: Pending clinical stabilization. Will likely benefit from discharge to a structured setting.     Ann Marie Woods MD  [facility]

## 2019-11-21 NOTE — PROGRESS NOTES
Hermann refused his vital signs this afternoon. RN notified.       11/21/19 4507   Vital Signs   Temp   (Refused)   Pulse   (Refused)   BP   (Refused)

## 2019-11-21 NOTE — PROGRESS NOTES
Patient has had minimal time in the milieu today, spending most of his time in bed resting/sleeping.  No observed peer interaction or program engagement so far. Again refused his AM vital signs.  Responses to writer's MH status inquiries generally delayed, abrupt, and minimally informative per his norm.  Mood today has been less irritable and abrasive than usual, however.  Affect remains blunted/flat.  So far, has not been observed responding to internal stimuli, e.g. gesturing, mouthing silent comments, etc.and has made no delusional references.  Patient continues to flatly deny all MH issues, concerns, and/or acuities.  Has presented no behavioral management situations.   Davian Florence   11/21/2019 11/21/19 1054   Sleep/Rest/Relaxation   Day/Evening Time Hours napping;resting in bed   Cognitive   Arousal Level arouses to voice   Follows Commands slow to respond   Best Language 0 - No aphasia   Behavioral Health   Hallucinations denies / not responding to hallucinations   Thinking delusional;paranoid   Orientation situation, disoriented   Memory other (see comment)  (ZAHRA)   Insight denial of illness;poor   Judgement impaired   Eye Contact   (no eye contact)   Affect blunted, flat   Hygiene neglected grooming - unclean body, hair, teeth   Suicidality other (see comments)  (denied SI)   1. Wish to be Dead (Recent) No   2. Non-Specific Active Suicidal Thoughts (Recent) No   Self Injury other (see comment)  (denied SIB urges)   Elopement   (no indicators so far today)   Activity isolative;withdrawn;refusal   Speech clear;coherent;other (see comments)  (paucity of volume and content)   Overt Aggression Scale   Verbal Aggression 0   Aggression against Property 0   Auto-Aggression 0   Physical Aggression 0   Overt Aggression Total Score 0   Coping/Psychosocial   Verbalized Emotional State other (see comments)  (denied all mood issues)   Psycho Education   Type of Intervention 1:1 intervention   Response  participates with encouragement   Hours 0.5   Treatment Detail current MH status   Group Therapy Session   Group Attendance refused to attend group session   Safety   Suicidality Status 15   Activities of Daily Living   Hygiene/Grooming prompts   Dress independent   Room Organization unable   Groups   Details no attendance or participation

## 2019-11-22 PROCEDURE — 25000132 ZZH RX MED GY IP 250 OP 250 PS 637: Performed by: PSYCHIATRY & NEUROLOGY

## 2019-11-22 PROCEDURE — 99233 SBSQ HOSP IP/OBS HIGH 50: CPT | Performed by: PSYCHIATRY & NEUROLOGY

## 2019-11-22 PROCEDURE — 12400001 ZZH R&B MH UMMC

## 2019-11-22 RX ORDER — LORAZEPAM 1 MG/1
1 TABLET ORAL 3 TIMES DAILY
Status: DISCONTINUED | OUTPATIENT
Start: 2019-11-22 | End: 2019-12-30 | Stop reason: HOSPADM

## 2019-11-22 RX ADMIN — OLANZAPINE 10 MG: 10 TABLET, ORALLY DISINTEGRATING ORAL at 20:58

## 2019-11-22 RX ADMIN — LORAZEPAM 1 MG: 1 TABLET ORAL at 20:55

## 2019-11-22 RX ADMIN — LORAZEPAM 1 MG: 1 TABLET ORAL at 13:08

## 2019-11-22 ASSESSMENT — ACTIVITIES OF DAILY LIVING (ADL)
LAUNDRY: WITH SUPERVISION
ORAL_HYGIENE: INDEPENDENT
ORAL_HYGIENE: INDEPENDENT;PROMPTS
DRESS: SCRUBS (BEHAVIORAL HEALTH);INDEPENDENT
HYGIENE/GROOMING: PROMPTS;INDEPENDENT
HYGIENE/GROOMING: HANDWASHING;SHOWER;INDEPENDENT
DRESS: SCRUBS (BEHAVIORAL HEALTH)

## 2019-11-22 NOTE — PROGRESS NOTES
Pt spent his time sleeping in bed and standing/sitting in the dining room. He appeared to be transfixed while standing in the dining room, starring at the coffee carafe and rocking side to side on his feet. He was minimally compliant with staff. He needed multiple redirections/reminders for simple requests, though he seemed to be more appropriate in general than previous days. Staff brought him an envelope addressed to him, though he didn't want to open it so it was placed in his folder. He neglected most of his ADLs, though he had dinner. He didn't socialize with peers or staff. He declined to check in with staff.     11/21/19 2130   Behavioral Health   Hallucinations denies / not responding to hallucinations   Thinking poor concentration;delusional   Orientation person: oriented   Memory other (see comment)  (ZAHRA)   Insight poor   Judgement impaired   Eye Contact staring   Affect tense;blunted, flat   Mood anxious;irritable   Physical Appearance/Attire appears stated age;disheveled   Hygiene neglected grooming - unclean body, hair, teeth;body odor   Suicidality other (see comments)  (ZAHRA)   1. Wish to be Dead (Recent)   (ZAHRA)   2. Non-Specific Active Suicidal Thoughts (Recent)   (ZAHRA)   Self Injury other (see comment)  (ZAHRA)   Elopement   (none)   Activity isolative;withdrawn   Speech coherent   Medication Sensitivity no stated side effects;no observed side effects   Psychomotor / Gait balanced;steady   Activities of Daily Living   Hygiene/Grooming independent;prompts   Oral Hygiene independent;prompts   Dress scrubs (behavioral health);independent;prompts   Room Organization independent

## 2019-11-22 NOTE — PROGRESS NOTES
"[facility], [place]   Psychiatric Progress Note  Hospital Day: 19        Interim History:   The patient's care was discussed with the treatment team during the daily team meeting and/or staff's chart notes were reviewed.  Staff report that patient was agitated last evening, punching the air and swearing to himself. Frightening other patients on the unit at times. No further episodes of agitation. Pt noted partial improvement in symptoms following administration of Ativan. Appears to be responding to internal stimuli. Not engaging in unit programming. Thought process is disorganized. Not attending to ADLs.     Upon interview, the patient was lying in his bed. He immediately said \"I am good.\" Again declined meeting in interview room. He said \"No\" when asked if medications have been helpful thus far. He appears agitated during this conversation. Continues to say \"I'm good.\" When asked if I could continue with our discussion, he said \"No, leave.\" Denied side effects. Denied medical concerns. Denied SI/HI/AH/VH.          Medications:       LORazepam  1 mg Oral TID     OLANZapine zydis  10 mg Oral At Bedtime    Or     OLANZapine  10 mg Intramuscular At Bedtime          Allergies:   No Known Allergies       Labs:   No results found for this or any previous visit (from the past 24 hour(s)).       Psychiatric Examination:     /53   Pulse 86   Temp 96.9  F (36.1  C) (Oral)   Resp 18   Ht 1.702 m (5' 7\")   Wt 75.3 kg (166 lb)   SpO2 94%   BMI 26.00 kg/m    Weight is 166 lbs 0 oz  Body mass index is 26 kg/m .    Weight over time:  Vitals:    11/03/19 0128   Weight: 75.3 kg (166 lb)       Orthostatic Vitals     None            Cardiometabolic risk assessment. 11/04/19      Reviewed patient profile for cardiometabolic risk factors    Date taken /Value  REFERENCE RANGE   Abdominal Obesity  (Waist Circumference)   See nursing flowsheet Women ?35 in (88 cm)   Men ?40 in (102 cm)    "   Triglycerides  No results found for: TRIG    ?150 mg/dL (1.7 mmol/L) or current treatment for elevated triglycerides   HDL cholesterol  No results found for: HDL]   Women <50 mg/dL (1.3 mmol/L) in women or current treatment for low HDL cholesterol  Men <40 mg/dL (1 mmol/L) in men or current treatment for low HDL cholesterol     Fasting plasma glucose (FPG) No results found for: GLC   FPG ?100 mg/dL (5.6 mmol/L) or treatment for elevated blood glucose   Blood pressure  BP Readings from Last 3 Encounters:   No data found for BP    Blood pressure ?130/85 mmHg or treatment for elevated blood pressure   Family History  See family history     Appearance: Initially asleep but then later awake, alert and disheveled. Subvocalizations. Audible grinding of his teeth.  Attitude:  guarded and mostly uncooperative  Eye Contact:  poor   Mood:  anxious  Affect:  mood congruent, intensity is blunted and guarded  Speech:  unable to assess. Pt was mostly unresponsive. though more communicative since admission  Language: fluent and intact in English  Psychomotor, Gait, Musculoskeletal: Pt swaying back and forth when standing. Grinding teeth repeatedly.  no evidence of tardive dyskinesia, dystonia, or tics  Throught Process:  disorganized and evidence of thought blocking present  Associations:  unable to assess  Thought Content:  patient appears to be responding to internal stimuli  Insight:  limited  Judgement:  limited  Oriented to:  Person and place   Attention Span and Concentration:  poor  Recent and Remote Memory:  limited  Fund of Knowledge:  low-normal    Clinical Global Impressions   First:  Considering your total clinical experience with this particular patient population, how severe are the patient's symptoms at this time?: 7 (11/03/19 0907)  Compared to the patient's condition at the START of treatment, this patient's condition is:: 4 (11/03/19 0907)  Most recent:  Considering your total clinical experience with this  particular patient population, how severe are the patient's symptoms at this time?: 7 (11/03/19 0907)  Compared to the patient's condition at the START of treatment, this patient's condition is:: 4 (11/03/19 0907)           Precautions:     Behavioral Orders   Procedures     Code 1 - Restrict to Unit     Routine Programming     As clinically indicated     Status 15     Every 15 minutes.          Diagnoses:     Schizophrenia, decompensated         Assessment & Plan:     Assessment and hospital summary:  The patient is a 34-year-old man who appears to be psychotic at this point in time. He was brought to ED by police after he was located wandering near a bridge while wearing inappropriate clothing for cold weather. He had blisters and cuts on his feet. We have no known history.  He is completely uncooperative with examination.  He is on a 72-hour hold.  Will petition for MI commitment given he is at high risk for frostbite, hypothermia, or other problems related to exposure given the winter is approaching, has demonstrated a clear inability to care for self, and refusing psychotropic medications despite severe symptoms of psychosis.     Target psychiatric symptoms and interventions:  Resume Zyprexa 10 mg at bedtime to target severe sx of psychosis. Will backup as [facility] is now in place. Would consider increase next week if ongoing sx of psychosis while monitoring catatonia sx closely  Add Ativan 1 mg TID given observed partial improvements following dose administration.     Medical Problems and Treatments:  Pt continues to refuse foot examination, though denies pain.  No reported medical concerns. Will continue to monitor closely. Will discontinue labs as patient has consistently declined them.    Behavioral/Psychological/Social:  Encourage unit programming    Legal: Committed with [facility]    Safety:  - Continue precautions as noted above  - Status 15 minute checks    Disposition: Pending clinical stabilization. Will  likely benefit from discharge to a structured setting.     Ann Marie Woods MD  [facility]

## 2019-11-22 NOTE — PROGRESS NOTES
Pt was isolated in his room the majority of the shift sleeping. Pt appeared irritable this shift and refused to check-in. Pt only came out in the milieu for meals.          11/22/19 1454   Behavioral Health   Hallucinations denies / not responding to hallucinations   Thinking distractable   Orientation situation, disoriented   Memory baseline memory   Insight poor   Judgement impaired   Eye Contact staring   Affect irritable   Mood irritable   Physical Appearance/Attire attire appropriate to age and situation   Hygiene neglected grooming - unclean body, hair, teeth   Suicidality other (see comments)  (ZAHRA)   1. Wish to be Dead (Recent) No   2. Non-Specific Active Suicidal Thoughts (Recent) No   3. Active Sucidal Ideation with any Methods (Not Plan) Without Intent to Act (Recent) No   4. Active Suicidal Ideation with Some Intent to Act, Without Specific Plan (Recent) No   5. Active Suicidal Ideation with Specific Plan and Intent (Recent) No   Self Injury other (see comment)  (ZAHRA)   Activity isolative   Speech coherent   Psychomotor / Gait balanced;steady   Overt Aggression Scale   Verbal Aggression 0   Aggression against Property 0   Auto-Aggression 0   Physical Aggression 0   Overt Aggression Total Score 0   Coping/Psychosocial   Verbalized Emotional State frustration   Activities of Daily Living   Hygiene/Grooming handwashing;shower;independent   Oral Hygiene independent   Dress scrubs (behavioral health);independent   Laundry with supervision   Room Organization independent

## 2019-11-23 PROCEDURE — 12400001 ZZH R&B MH UMMC

## 2019-11-23 PROCEDURE — 25000132 ZZH RX MED GY IP 250 OP 250 PS 637: Mod: GY | Performed by: PSYCHIATRY & NEUROLOGY

## 2019-11-23 RX ADMIN — OLANZAPINE 10 MG: 10 TABLET, ORALLY DISINTEGRATING ORAL at 20:32

## 2019-11-23 RX ADMIN — LORAZEPAM 1 MG: 1 TABLET ORAL at 15:39

## 2019-11-23 RX ADMIN — LORAZEPAM 1 MG: 1 TABLET ORAL at 08:40

## 2019-11-23 RX ADMIN — LORAZEPAM 1 MG: 1 TABLET ORAL at 20:32

## 2019-11-23 ASSESSMENT — ACTIVITIES OF DAILY LIVING (ADL)
ORAL_HYGIENE: INDEPENDENT;PROMPTS
HYGIENE/GROOMING: INDEPENDENT;PROMPTS
DRESS: SCRUBS (BEHAVIORAL HEALTH)
LAUNDRY: WITH SUPERVISION

## 2019-11-23 NOTE — PROGRESS NOTES
Pt spent his time standing and sitting in the dining room. He rocked side to side on his feet while standing. He appeared tense and was irritable at times. He stared at the window and seemed to be talking to himself, often twitching and making quick, jerking motions with his hands and head. He did not interact with peer or staff. He ate a large amount of snacks as well as his dinner, though he neglected other ADLs. He declined to check in with staff.     11/22/19 2200   Behavioral Health   Hallucinations appears responding   Thinking distractable;delusional   Orientation person: oriented   Memory baseline memory   Insight poor   Judgement impaired   Eye Contact staring   Affect tense   Mood irritable   Physical Appearance/Attire disheveled   Hygiene neglected grooming - unclean body, hair, teeth;body odor   Suicidality other (see comments)  (ZAHRA)   1. Wish to be Dead (Recent)   (ZAHRA)   2. Non-Specific Active Suicidal Thoughts (Recent)   (ZAHRA)   Self Injury other (see comment)  (ZAHRA)   Elopement   (none)   Activity isolative;withdrawn   Speech coherent   Medication Sensitivity no stated side effects;no observed side effects   Psychomotor / Gait balanced;steady   Activities of Daily Living   Hygiene/Grooming prompts;independent   Oral Hygiene independent;prompts   Dress scrubs (behavioral health)   Room Organization independent

## 2019-11-24 PROCEDURE — 12400001 ZZH R&B MH UMMC

## 2019-11-24 PROCEDURE — 25000132 ZZH RX MED GY IP 250 OP 250 PS 637: Mod: GY | Performed by: PSYCHIATRY & NEUROLOGY

## 2019-11-24 RX ADMIN — LORAZEPAM 1 MG: 1 TABLET ORAL at 20:26

## 2019-11-24 RX ADMIN — OLANZAPINE 10 MG: 10 TABLET, ORALLY DISINTEGRATING ORAL at 20:26

## 2019-11-24 RX ADMIN — LORAZEPAM 1 MG: 1 TABLET ORAL at 13:32

## 2019-11-24 RX ADMIN — LORAZEPAM 1 MG: 1 TABLET ORAL at 08:20

## 2019-11-24 ASSESSMENT — ACTIVITIES OF DAILY LIVING (ADL)
ORAL_HYGIENE: PROMPTS
HYGIENE/GROOMING: PROMPTS
LAUNDRY: WITH SUPERVISION
DRESS: SCRUBS (BEHAVIORAL HEALTH)

## 2019-11-24 NOTE — PROGRESS NOTES
"Patient has been visible in the milieu for majority of the shift in the lounge area. Patient continues to  the lounge area and rock back and forth staring into space. Patient was approached by writer but he didn't respond when I attempted to talk to him. Patient also sat in the lounge area and watched movies with peers. Affect is flat and irritable. Eating excessively. Observed opening several peanut butter containers and scooping out the contents with his fingers. Patient was med compliant.     Another female patient approached staff and stated that patient had made a sexual proposition towards her. Patient was asked if he had said anything sexually to another patient, he stated, \" I wouldn't do that\". I reminded him not to use any inappropriate sexual talk with other patients and that it wouldn't be tolerated on the unit. He agreed.     Patient evaluation continues. Assessed mood,anxiety,thoughts and behavior.     Patient gradually progressing towards goals.    Patient is encouraged to participate in groups and assisted to develop healthy coping skills.     VS reviewed: /53   Pulse 86   Temp 96.9  F (36.1  C) (Oral)   Resp 18   Ht 1.702 m (5' 7\")   Wt 75.3 kg (166 lb)   SpO2 94%   BMI 26.00 kg/m      Length of stay: 20    Refer to daily team meeting notes for individualized plan of care. Nursing will continue to assess.      "

## 2019-11-24 NOTE — PROGRESS NOTES
"patient spent most of the shift in the lounge, not talking and staring at patients, staff, and television intermittently. He required a few prompts to pull up his pants to stop exposing his \"Karthik\" and to stop masturbating in the lounge. patient responded well to redirects, but does require close supervision in public areas to discourage inappropriate behavior. patient denies all issues, but was noted talking to himself and yelling at the coffee.       11/24/19 7596   Behavioral Health   Hallucinations appears responding   Thinking poor concentration   Orientation person: oriented;place: oriented   Insight denial of illness   Judgement impaired   Eye Contact at examiner   Affect blunted, flat   Mood mood is calm   Physical Appearance/Attire disheveled;untidy   Hygiene neglected grooming - unclean body, hair, teeth   1. Wish to be Dead (Recent) No   2. Non-Specific Active Suicidal Thoughts (Recent) No   Activity other (see comment);withdrawn  (In lounge)   Speech coherent   Medication Sensitivity no stated side effects;no observed side effects   Psychomotor / Gait balanced;steady   Psycho Education   Type of Intervention 1:1 intervention   Response participates, initiates socially appropriate   Hours 0.5   Activities of Daily Living   Hygiene/Grooming prompts   Oral Hygiene prompts   Dress scrubs (behavioral health)   Laundry with supervision   Room Organization prompts     "

## 2019-11-25 ENCOUNTER — TRANSFERRED RECORDS (OUTPATIENT)
Dept: HEALTH INFORMATION MANAGEMENT | Facility: CLINIC | Age: 34
End: 2019-11-25

## 2019-11-25 PROCEDURE — 99232 SBSQ HOSP IP/OBS MODERATE 35: CPT | Performed by: PSYCHIATRY & NEUROLOGY

## 2019-11-25 PROCEDURE — 25000132 ZZH RX MED GY IP 250 OP 250 PS 637: Mod: GY | Performed by: PSYCHIATRY & NEUROLOGY

## 2019-11-25 PROCEDURE — 12400001 ZZH R&B MH UMMC

## 2019-11-25 RX ADMIN — LORAZEPAM 1 MG: 1 TABLET ORAL at 13:59

## 2019-11-25 RX ADMIN — LORAZEPAM 1 MG: 1 TABLET ORAL at 20:25

## 2019-11-25 RX ADMIN — LORAZEPAM 1 MG: 1 TABLET ORAL at 09:45

## 2019-11-25 RX ADMIN — OLANZAPINE 10 MG: 10 TABLET, ORALLY DISINTEGRATING ORAL at 20:25

## 2019-11-25 ASSESSMENT — ACTIVITIES OF DAILY LIVING (ADL)
HYGIENE/GROOMING: INDEPENDENT
DRESS: STREET CLOTHES;SCRUBS (BEHAVIORAL HEALTH)
DRESS: PROMPTS
ORAL_HYGIENE: PROMPTS
LAUNDRY: WITH SUPERVISION
ORAL_HYGIENE: INDEPENDENT
LAUNDRY: UNABLE TO COMPLETE
HYGIENE/GROOMING: PROMPTS

## 2019-11-25 NOTE — PLAN OF CARE
BEHAVIORAL TEAM DISCUSSION    Participants: Percy Cordova MD; Ramesh Narayan [facility]; Dalia Salinas RN    Progress: Patient is on day 22 of hospital stay.  He remains due to being under involuntary civil commitment with [facility] order .  He also remains symptomatic.  He isn't communicating.  He appears to continue to respond to internal stimuli.  He is visible in the milieu as he will  the same spot for hours.  He refuses his 1:1 check in.  He is med compliant.  He is not participating in offered groups and would not benefit from them at this time.  He maintains a good appetite and sleep is adequate.     Anticipated length of stay: unknown.    Continued Stay Criteria/Rationale: He remains due to being under involuntary civil commitment with [facility] order .  He also remains symptomatic.  He isn't communicating.  He appears to continue to respond to internal stimuli.  He is visible in the milieu as he will  the same spot for hours.  He refuses his 1:1 check in.    Medical/Physical: no acute issues.     Precautions:   Behavioral Orders   Procedures    Code 1 - Restrict to Unit    Routine Programming     As clinically indicated    Sexual precautions    Status 15     Every 15 minutes.     Plan: Target psychiatric symptoms and interventions:  Resume Zyprexa 10 mg at bedtime to target severe sx of psychosis. Will backup as [facility] is now in place. Would consider increase next week if ongoing sx of psychosis while monitoring catatonia sx closely  Add Ativan 1 mg TID given observed partial improvements following dose administration.      Medical Problems and Treatments:  Pt continues to refuse foot examination, though denies pain.  No reported medical concerns. Will continue to monitor closely. Will discontinue labs as patient has consistently declined them.     Behavioral/Psychological/Social:  Encourage unit programming     Legal: Committed with [facility]     Safety:  - Continue precautions as noted above  - Status  15 minute checks     Disposition: Pending clinical stabilization. Will likely benefit from discharge to a structured setting.     Rationale for change in precautions or plan: no changes.

## 2019-11-25 NOTE — PROGRESS NOTES
Patient's  Mildred Grene will stop by to see him Tuesday, Nov. 26 around 10am.  She is aware that patient is not stable and that visit may be brief.

## 2019-11-25 NOTE — PROGRESS NOTES
Pt refused vital signs     11/25/19 1701   Vital Signs   Temp   (pt refused)   Pulse   (pt refused)   Pulse/Heart Rate Source Monitor

## 2019-11-25 NOTE — PROGRESS NOTES
Hermann refused to check-in with this writer. He was isolative and withdrawn to his room for the majority part of the shift. He was calm with full range of affect. His appetite was good and sleeping/napping well. No behavioral issues has been observed during this shift.       11/25/19 1037   Behavioral Health   Hallucinations appears responding   Thinking poor concentration;paranoid;delusional;confused   Orientation time: oriented;date: oriented;place: oriented;person: oriented   Memory baseline memory   Insight poor   Judgement impaired   Eye Contact at examiner   Affect full range affect   Mood mood is calm;depressed   Physical Appearance/Attire attire appropriate to age and situation   Hygiene neglected grooming - unclean body, hair, teeth   Suicidality   (Nothing stated or observed )   1. Wish to be Dead (Recent)   (Nothing stated or observed )   2. Non-Specific Active Suicidal Thoughts (Recent)   (Nothing stated or observed )   Self Injury   (Nothing stated or observed )   Elopement   (Nothing stated or observed )   Activity withdrawn;isolative   Speech mute;pressured   Medication Sensitivity no stated side effects   Psychomotor / Gait steady;balanced   Psycho Education   Type of Intervention other (see comment)   Response observes from a distance   Hours 1.5   Activities of Daily Living   Hygiene/Grooming independent   Oral Hygiene independent   Dress street clothes;scrubs (behavioral health)   Laundry with supervision   Room Organization independent

## 2019-11-25 NOTE — PROGRESS NOTES
"Writer had to call patient's name several times and shake him in order for him to wake up. Patient took his HS medications, after taking his medication patient stated in an angry tone, \" thanks for fucking us\".   "

## 2019-11-25 NOTE — PROGRESS NOTES
Hermann continues to rock back and forth in the hallway by the coffee. Pt refused to check in, and when asked questions about his mental health would shrug and not answer. Pt, throughout the evening, would look incredibly irritated and was observed responding to hallucinations and mouthing words to said hallucinations. Pt no longer holds his pants, but continues to need redirection at times for inappropiately touching himself in the milieu. Pt presents with poor hygiene, distractable, irritable, and untidy. Pt ate at mealtimes.     11/24/19 2048   Behavioral Health   Hallucinations appears responding   Thinking poor concentration;confused   Orientation situation, disoriented   Memory other (see comment)  (ZAHRA)   Insight poor   Judgement impaired   Eye Contact staring   Affect irritable;tense   Mood irritable;labile   Physical Appearance/Attire untidy;disheveled   Hygiene neglected grooming - unclean body, hair, teeth   Suicidality other (see comments)  (ZAHRA)   1. Wish to be Dead (Recent)   (ZAHRA)   2. Non-Specific Active Suicidal Thoughts (Recent)   (ZAHRA)   Elopement   (No observed behaviors of concern)   Activity withdrawn;restless   Speech coherent   Medication Sensitivity other (see comment)  (ZAHRA)   Psychomotor / Gait balanced;steady   Psycho Education   Type of Intervention 1:1 intervention   Response refuses   Hours 0.5   Treatment Detail   (Check in)

## 2019-11-26 PROCEDURE — 12400001 ZZH R&B MH UMMC

## 2019-11-26 PROCEDURE — 25000132 ZZH RX MED GY IP 250 OP 250 PS 637: Mod: GY | Performed by: PSYCHIATRY & NEUROLOGY

## 2019-11-26 PROCEDURE — 99232 SBSQ HOSP IP/OBS MODERATE 35: CPT | Performed by: PSYCHIATRY & NEUROLOGY

## 2019-11-26 RX ADMIN — LORAZEPAM 1 MG: 1 TABLET ORAL at 08:22

## 2019-11-26 RX ADMIN — LORAZEPAM 1 MG: 1 TABLET ORAL at 21:13

## 2019-11-26 RX ADMIN — LORAZEPAM 1 MG: 1 TABLET ORAL at 14:08

## 2019-11-26 RX ADMIN — OLANZAPINE 10 MG: 10 TABLET, ORALLY DISINTEGRATING ORAL at 21:13

## 2019-11-26 ASSESSMENT — ACTIVITIES OF DAILY LIVING (ADL)
DRESS: INDEPENDENT;SCRUBS (BEHAVIORAL HEALTH);PROMPTS
HYGIENE/GROOMING: INDEPENDENT;PROMPTS
ORAL_HYGIENE: INDEPENDENT;PROMPTS

## 2019-11-26 NOTE — PLAN OF CARE
"Hermann has been visible in the community areas of the unit all evening. Most times he has been rocking back and forth on his feet while standing and snacking in the dining room. At times he talks to himself and gets animated with shaking his arm or pointing his index finger.  Other times he was visualized sitting in the lounge watching movies with his peers. He presents as disheveled, untidy and unkept. His scrub top is soiled with food stains. He was medication compliant tonight. His fingers were full of peanut butter and jelly. He uses his fingers rather than an eating utensil to get the product out of the packaging to place on crackers. At times he grabs at his penis and needs to be redirected to go to his room as necessary. Tonight when giving him his medication he did ask this writer: \"Why does God make some stuff, I mean what is the reason?\" Other than that statement Hermann has talked with no one tonight. He denies SI/SIB. Appears to be responding to internal stimuli. He has been cooperative with redirection.  Nursing to continue current plan of care.  "

## 2019-11-26 NOTE — PROGRESS NOTES
"[facility], [place]   Psychiatric Progress Note    Length of stay (days): 23        Interim History:   The patient's care was discussed with the treatment team during the daily team meeting and/or staff's chart notes were reviewed.  Staff report: no acute issues over night.  Eating meals.  Sleeping adequately.  Taking his medications.    On examination today, the patient was mostly passive and did not engage much.  He had no mental health related complaints today, denying depressed mood, suicidal ideation or psychotic symptoms.  No medication side effects reported.  Sleeping and eating adequately.         Medications:       LORazepam  1 mg Oral TID     OLANZapine zydis  10 mg Oral At Bedtime    Or     OLANZapine  10 mg Intramuscular At Bedtime          Allergies:   No Known Allergies       Labs:   No results found for this or any previous visit (from the past 24 hour(s)).       Psychiatric Examination:     /53   Pulse 86   Temp 96.9  F (36.1  C) (Oral)   Resp 18   Ht 1.702 m (5' 7\")   Wt 75.3 kg (166 lb)   SpO2 94%   BMI 26.00 kg/m    Weight is 166 lbs 0 oz  Body mass index is 26 kg/m .  Orthostatic Vitals     None            Appearance: casually dressed and slightly unkempt  Attitude:  guarded  Eye Contact:  poor   Mood:  better  Affect:  intensity is flat  Speech:  decreased prosody  Psychomotor Behavior:  physical retardation  Throught Process:  Tight  Associations:  no loose associations  Thought Content:  no evidence of suicidal ideation or homicidal ideation and auditory hallucinations present  Insight:  limited  Judgement:  limited  Oriented to:  Person and place  Attention Span and Concentration:  limited  Recent and Remote Memory:  limited    Clinical Global Impressions  First:  Considering your total clinical experience with this particular patient population, how severe are the patient's symptoms at this time?: 7 (11/03/19 0907)  Compared to the patient's " condition at the START of treatment, this patient's condition is:: 4 (11/03/19 0907)  Most recent:  Considering your total clinical experience with this particular patient population, how severe are the patient's symptoms at this time?: 7 (11/21/19 1524)  Compared to the patient's condition at the START of treatment, this patient's condition is:: 4 (11/21/19 1524)         Precautions:     Behavioral Orders   Procedures     Code 1 - Restrict to Unit     Routine Programming     As clinically indicated     Sexual precautions     Status 15     Every 15 minutes.          DIagnoses:     Schizophrenia, decompensated       Plan:     Continue current medications as we monitor response and tolerability.  Redman order is in place.  Currently on a trial of Ativan to determine if catatonia is complicating his presentation    Psychosocial treatments to be addressed with social work consult and groups.    Legal Status:  Patient is Committed    Disposition: To be determined

## 2019-11-26 NOTE — PROGRESS NOTES
Patient slept through the AM hours, getting up for the day at about 1130.  Since being up, he has spent the better part of his time standing in the hallway staring into space and rocking back and forth.  No observed peer interaction or program engagement today.  Speech, though, has been substantially more spontaneous and informative today, with better eye contact.  building on yesterday evening's performance.  Mood has been notably calmer and attitude less hostile and affect more expansive as well.  Patient continues to talk to himself but with less frequency or intensity and with little to no guesturing.  Patient continues to deny all MH issues, concerns, and acuities per his usual.  Primary focus remains on being discharged.  Met with his [facility] this afternoon to talk about options.  Has presented no behavioral challenges today.   Davian Florence   11/26/2019

## 2019-11-26 NOTE — PROGRESS NOTES
"Hermann all of a sudden became very alert and oriented about 10PM. Hermann shaved his manzo, took a shower and clipped his nails. He changed his scrubs. He is very talkative right now. He states \"He went off his medications when he lost his housing and just needed time to catch up on his sleep. Hermann has been encouraged to come to the medication window if he feels he needs medication because right now he appears 100% better than he did before I gave him his medication 2 hours ago.  "

## 2019-11-27 PROCEDURE — 12400001 ZZH R&B MH UMMC

## 2019-11-27 PROCEDURE — 25000132 ZZH RX MED GY IP 250 OP 250 PS 637: Performed by: PSYCHIATRY & NEUROLOGY

## 2019-11-27 PROCEDURE — 99232 SBSQ HOSP IP/OBS MODERATE 35: CPT | Performed by: PSYCHIATRY & NEUROLOGY

## 2019-11-27 RX ORDER — OLANZAPINE 15 MG/1
15 TABLET, ORALLY DISINTEGRATING ORAL AT BEDTIME
Status: DISCONTINUED | OUTPATIENT
Start: 2019-11-27 | End: 2019-12-02

## 2019-11-27 RX ORDER — OLANZAPINE 10 MG/2ML
10 INJECTION, POWDER, FOR SOLUTION INTRAMUSCULAR AT BEDTIME
Status: DISCONTINUED | OUTPATIENT
Start: 2019-11-27 | End: 2019-12-02

## 2019-11-27 RX ADMIN — LORAZEPAM 1 MG: 1 TABLET ORAL at 16:11

## 2019-11-27 RX ADMIN — OLANZAPINE 15 MG: 15 TABLET, ORALLY DISINTEGRATING ORAL at 21:07

## 2019-11-27 RX ADMIN — LORAZEPAM 1 MG: 1 TABLET ORAL at 10:16

## 2019-11-27 RX ADMIN — LORAZEPAM 1 MG: 1 TABLET ORAL at 21:07

## 2019-11-27 ASSESSMENT — ACTIVITIES OF DAILY LIVING (ADL)
HYGIENE/GROOMING: PROMPTS
ORAL_HYGIENE: PROMPTS
DRESS: SCRUBS (BEHAVIORAL HEALTH);PROMPTS
DRESS: SCRUBS (BEHAVIORAL HEALTH);INDEPENDENT
HYGIENE/GROOMING: PROMPTS
LAUNDRY: WITH SUPERVISION
ORAL_HYGIENE: PROMPTS

## 2019-11-27 NOTE — PROGRESS NOTES
"Pt had a neutral evening. He spent his time standing near the coffee carafe, sitting at the table with snacks, and resting in bed. He appeared to be tense. He rocked side to side, as well as back and forth. He seemed like he needed to use the bathroom at times, yet when staff prompted him to use the bathroom, he declined, saying, \"I'm fine.\" Once, he started rushing to his bathroom, but urinated in his pants before he could make it. He ate a lot of snacks this evening. He did not interact with peers or staff. He declined to check in with the writer.     11/26/19 2200   Behavioral Health   Hallucinations appears responding   Thinking poor concentration   Orientation person: oriented   Memory baseline memory   Insight poor   Judgement impaired   Eye Contact at examiner   Affect tense   Mood irritable   Physical Appearance/Attire disheveled   Hygiene body odor;neglected grooming - unclean body, hair, teeth   Suicidality other (see comments)  (ZAHRA)   1. Wish to be Dead (Recent)   (ZAHRA)   2. Non-Specific Active Suicidal Thoughts (Recent)   (ZAHRA)   Self Injury other (see comment)  (ZAHRA)   Elopement   (none)   Activity withdrawn   Speech clear;coherent   Medication Sensitivity no stated side effects;no observed side effects   Psychomotor / Gait balanced;steady   Activities of Daily Living   Hygiene/Grooming independent;prompts   Oral Hygiene independent;prompts   Dress independent;scrubs (behavioral health);prompts   Room Organization independent     "

## 2019-11-27 NOTE — PROGRESS NOTES
"Patient has spent the second part of the shift in the [facility] area. Patient continues to  the same spot with his coffee cup in his hand. Ate meals.  Appears distracted and responding to internal stimuli. Doesn't socialize with peers. Patient was approached for a check-in but stated that he just needed to speak with his . Med compliant.     Patient evaluation continues. Assessed mood,anxiety,thoughts and behavior.     Patient gradually progressing towards goals.    Patient is encouraged to participate in groups and assisted to develop healthy coping skills.     VS reviewed: /53   Pulse 86   Temp 96.9  F (36.1  C) (Oral)   Resp 18   Ht 1.702 m (5' 7\")   Wt 75.3 kg (166 lb)   SpO2 94%   BMI 26.00 kg/m      Length of stay: 24    Refer to daily team meeting notes for individualized plan of care. Nursing will continue to assess.      "

## 2019-11-27 NOTE — PROGRESS NOTES
Famr met with patient and provider briefly.  Pt has been requesting to discharge and provider told patient to connect with his county worker and work out a plan.  After pt left provider office pt was requesting to meet with writer.  Writer agreed to meet with patient at 2pm.  At 2pm patient was in his bed sleeping.  Writer did not wake patient since patient is not stable enough for discharge, doesn't have an appropriate plan.  The conversation with patient would not change anything.    Writer will be in tomorrow if patient is requesting to meet with fmar.  However, staff can encourage patient to continue maintaining ADLs and working on an appropriate discharge plan (including name, address, and number of person he plans on staying with) to show CTC and provider that he is doing better and is well enough to discharge safely to the community.

## 2019-11-27 NOTE — PROGRESS NOTES
"[facility], [place]   Psychiatric Progress Note    Length of stay (days): 24        Interim History:   The patient's care was discussed with the treatment team during the daily team meeting and/or staff's chart notes were reviewed.  Staff report: no acute issues over night.  Appears to be improved with a clear thought process at times.  Eating meals.  Sleeping adequately.  Taking his medications.     On examination today, the patient seemed more engaged however would frequently become guarded and display thought blocking.  His conversation focused on wanting to be discharged today.  He tells me that he would like to stay with a friend and would be able to a cab there.  He does not have his friends phone number and he has not had a visit from his friend.  He tells me that he does not have any other friends or family members could help make contact with his friend.  The patient tells me that he feels well and believes he has accomplished his treatment goals and is ready for discharge.    He was refusing to increase the dose of Zyprexa today simply arguing that he feels fine on the current dose.  He did not report any side effects.    No vegetative symptoms reported.  He denied psychotic symptoms.  He denied suicidal and homicidal thoughts.  He reports sleeping and eating well.         Medications:       LORazepam  1 mg Oral TID     OLANZapine zydis  10 mg Oral At Bedtime    Or     OLANZapine  10 mg Intramuscular At Bedtime          Allergies:   No Known Allergies       Labs:   No results found for this or any previous visit (from the past 24 hour(s)).       Psychiatric Examination:     /53   Pulse 86   Temp 96.9  F (36.1  C) (Oral)   Resp 18   Ht 1.702 m (5' 7\")   Wt 75.3 kg (166 lb)   SpO2 94%   BMI 26.00 kg/m    Weight is 166 lbs 0 oz  Body mass index is 26 kg/m .  Orthostatic Vitals     None            Appearance: casually dressed and slightly unkempt  Attitude:  " guarded  Eye Contact:  poor   Mood:  better  Affect:  intensity is flat  Speech:  decreased prosody  Psychomotor Behavior:  physical retardation  Throught Process:  Tight  Associations:  no loose associations  Thought Content:  no evidence of suicidal ideation or homicidal ideation and auditory hallucinations present  Insight:  limited  Judgement:  limited  Oriented to:  Person and place  Attention Span and Concentration:  limited  Recent and Remote Memory:  limited    Clinical Global Impressions  First:  Considering your total clinical experience with this particular patient population, how severe are the patient's symptoms at this time?: 7 (11/03/19 0907)  Compared to the patient's condition at the START of treatment, this patient's condition is:: 4 (11/03/19 0907)  Most recent:  Considering your total clinical experience with this particular patient population, how severe are the patient's symptoms at this time?: 7 (11/21/19 1524)  Compared to the patient's condition at the START of treatment, this patient's condition is:: 4 (11/21/19 1524)         Precautions:     Behavioral Orders   Procedures     Code 1 - Restrict to Unit     Routine Programming     As clinically indicated     Sexual precautions     Status 15     Every 15 minutes.          DIagnoses:     Schizophrenia, decompensated       Plan:     Continue current medications as we monitor response and tolerability.  Redman order is in place.  Currently on a trial of Ativan to determine if catatonia is complicating his presentation    Psychosocial treatments to be addressed with social work consult and groups.    Legal Status:  Patient is Committed    Disposition: To be determined

## 2019-11-28 PROCEDURE — 12400001 ZZH R&B MH UMMC

## 2019-11-28 PROCEDURE — 25000132 ZZH RX MED GY IP 250 OP 250 PS 637: Performed by: PSYCHIATRY & NEUROLOGY

## 2019-11-28 PROCEDURE — 25000132 ZZH RX MED GY IP 250 OP 250 PS 637: Mod: GY | Performed by: PSYCHIATRY & NEUROLOGY

## 2019-11-28 PROCEDURE — H2032 ACTIVITY THERAPY, PER 15 MIN: HCPCS

## 2019-11-28 RX ADMIN — LORAZEPAM 1 MG: 1 TABLET ORAL at 08:50

## 2019-11-28 RX ADMIN — OLANZAPINE 15 MG: 15 TABLET, ORALLY DISINTEGRATING ORAL at 21:34

## 2019-11-28 RX ADMIN — LORAZEPAM 1 MG: 1 TABLET ORAL at 21:34

## 2019-11-28 RX ADMIN — LORAZEPAM 1 MG: 1 TABLET ORAL at 14:23

## 2019-11-28 ASSESSMENT — ACTIVITIES OF DAILY LIVING (ADL)
HYGIENE/GROOMING: PROMPTS
ORAL_HYGIENE: PROMPTS
DRESS: SCRUBS (BEHAVIORAL HEALTH)
LAUNDRY: UNABLE TO COMPLETE

## 2019-11-28 NOTE — PROGRESS NOTES
"[facility], [place]   Psychiatric Progress Note    Length of stay (days): 24        Interim History:   The patient's care was discussed with the treatment team during the daily team meeting and/or staff's chart notes were reviewed.  Staff report: no acute issues over night.  Prominent disorganization continues.  Episode of urinating on himself yesterday.  Continues to have moments of improved thought process and more linear conversations.  Eating meals.  Sleeping adequately.  Taking his medications.     On examination today, he focused again on discharge from the hospital.  He reviewed with me the same plan to stay with his friend however has not been able to formulate a reasonable plan on how to contact that person.  Discussion of targeted symptoms seem to prompt some irritability and defiance.  He continues to have difficulty tolerating a meaningful interview.  Frequent episodes of thought blocking throughout our meeting while appearing to respond to psychotic stimuli.    No vegetative symptoms reported.  He denied psychotic symptoms.  He denied suicidal and homicidal thoughts.  He reports sleeping and eating well.         Medications:       LORazepam  1 mg Oral TID     OLANZapine zydis  10 mg Oral At Bedtime    Or     OLANZapine  10 mg Intramuscular At Bedtime          Allergies:   No Known Allergies       Labs:   No results found for this or any previous visit (from the past 24 hour(s)).       Psychiatric Examination:     /53   Pulse 86   Temp 96.9  F (36.1  C) (Oral)   Resp 18   Ht 1.702 m (5' 7\")   Wt 75.3 kg (166 lb)   SpO2 94%   BMI 26.00 kg/m    Weight is 166 lbs 0 oz  Body mass index is 26 kg/m .  Orthostatic Vitals     None            Appearance: casually dressed and slightly unkempt  Attitude:  guarded  Eye Contact:  poor   Mood:  better  Affect:  intensity is flat  Speech:  decreased prosody  Psychomotor Behavior:  physical retardation  Throught Process:  " Tight  Associations:  no loose associations  Thought Content:  no evidence of suicidal ideation or homicidal ideation and auditory hallucinations present  Insight:  limited  Judgement:  limited  Oriented to:  Person and place  Attention Span and Concentration:  limited  Recent and Remote Memory:  limited    Clinical Global Impressions  First:  Considering your total clinical experience with this particular patient population, how severe are the patient's symptoms at this time?: 7 (11/03/19 0907)  Compared to the patient's condition at the START of treatment, this patient's condition is:: 4 (11/03/19 0907)  Most recent:  Considering your total clinical experience with this particular patient population, how severe are the patient's symptoms at this time?: 7 (11/21/19 1524)  Compared to the patient's condition at the START of treatment, this patient's condition is:: 4 (11/21/19 1524)         Precautions:     Behavioral Orders   Procedures     Code 1 - Restrict to Unit     Routine Programming     As clinically indicated     Sexual precautions     Status 15     Every 15 minutes.          DIagnoses:     Schizophrenia, decompensated       Plan:     Increase Zyprexa to better address psychotic symptoms.  Redman order is in place.  Currently on a trial of Ativan to determine if catatonia is complicating his presentation    Psychosocial treatments to be addressed with social work consult and groups.    Legal Status:  Patient is Committed    Disposition: To be determined

## 2019-11-28 NOTE — PROGRESS NOTES
patient spent most of the shift in his room. He denies all issues, but kept largely to himself and neglected ADLs.       11/28/19 1511   Behavioral Health   Hallucinations denies / not responding to hallucinations   Thinking poor concentration   Orientation person: oriented;place: oriented;date: oriented;time: oriented   Memory baseline memory   Insight poor   Judgement impaired   Eye Contact at examiner   Affect blunted, flat   Mood mood is calm   Physical Appearance/Attire disheveled;untidy   1. Wish to be Dead (Recent) No   2. Non-Specific Active Suicidal Thoughts (Recent) No   Activity isolative;withdrawn   Speech clear;coherent   Medication Sensitivity no stated side effects;no observed side effects   Psychomotor / Gait balanced;steady   Psycho Education   Type of Intervention 1:1 intervention   Response participates, initiates socially appropriate   Hours 0.5   Activities of Daily Living   Hygiene/Grooming prompts   Oral Hygiene prompts   Dress scrubs (behavioral health)   Laundry unable to complete   Room Organization prompts

## 2019-11-28 NOTE — PROGRESS NOTES
Pt seemed to be in a slightly anxious mood this evening. He spent his time standing near the coffee carafe, sitting in the lounge, and eating snacks. He was moderately cooperative with staff and generally appropriate in the milieu. He rocked side to side on his feet at times, though it wasn't excessive. He didn't interact with peers or staff. He declined to let staff take his vitals or check in with him. He neglected all of his hygiene ADLs.      11/27/19 1900   Behavioral Health   Hallucinations appears responding   Thinking poor concentration;distractable   Orientation person: oriented;place: oriented   Memory other (see comment)  (ZAHRA)   Insight poor   Judgement impaired   Eye Contact at examiner   Affect blunted, flat;tense   Mood anxious   Physical Appearance/Attire disheveled   Hygiene neglected grooming - unclean body, hair, teeth   Suicidality other (see comments)  (ZAHRA)   1. Wish to be Dead (Recent)   (ZAHRA)   2. Non-Specific Active Suicidal Thoughts (Recent)   (ZAHRA)   Self Injury other (see comment)  (ZAHRA)   Elopement   (none)   Activity withdrawn   Speech clear   Medication Sensitivity no stated side effects;no observed side effects   Psychomotor / Gait balanced;steady   Activities of Daily Living   Hygiene/Grooming prompts   Oral Hygiene prompts   Dress scrubs (behavioral health);prompts   Room Organization prompts

## 2019-11-29 PROCEDURE — 12400001 ZZH R&B MH UMMC

## 2019-11-29 PROCEDURE — 25000132 ZZH RX MED GY IP 250 OP 250 PS 637: Performed by: PSYCHIATRY & NEUROLOGY

## 2019-11-29 RX ADMIN — LORAZEPAM 1 MG: 1 TABLET ORAL at 13:47

## 2019-11-29 RX ADMIN — BENZOCAINE, MENTHOL 1 LOZENGE: 15; 3.6 LOZENGE ORAL at 19:04

## 2019-11-29 RX ADMIN — LORAZEPAM 1 MG: 1 TABLET ORAL at 22:15

## 2019-11-29 RX ADMIN — OLANZAPINE 15 MG: 15 TABLET, ORALLY DISINTEGRATING ORAL at 22:15

## 2019-11-29 RX ADMIN — LORAZEPAM 1 MG: 1 TABLET ORAL at 09:45

## 2019-11-29 ASSESSMENT — ACTIVITIES OF DAILY LIVING (ADL)
HYGIENE/GROOMING: PROMPTS
LAUNDRY: UNABLE TO COMPLETE
DRESS: SCRUBS (BEHAVIORAL HEALTH)
HYGIENE/GROOMING: HANDWASHING;INDEPENDENT;PROMPTS
ORAL_HYGIENE: PROMPTS
LAUNDRY: UNABLE TO COMPLETE
DRESS: SCRUBS (BEHAVIORAL HEALTH);PROMPTS
ORAL_HYGIENE: PROMPTS

## 2019-11-29 NOTE — PROGRESS NOTES
Hermann refused his vital signs this afternoon. RN notified.       11/29/19 1497   Vital Signs   Temp   (Refused)   Pulse   (Refused)   BP   (Refused)

## 2019-11-29 NOTE — PROGRESS NOTES
Hermann stood outside the ground room door, engaging with the music for the duration of the Music Therapy session this evening.  MT offered him a song choice, but he just wanted to listen.  Appeared highly engaged, with eye contact on the group, swaying to the music and a light smile on his face.

## 2019-11-29 NOTE — PLAN OF CARE
Hermann had a low profile evening. He was out watching tv a few times unobstrusively, but mainly stayed in his room and kept to himself. He did smile and make eye contact with others at times, which is a bit better than par for the course. Was not interested in meeting one:one. He went to bed early, so his HS meds were brought into him, and he took them without complaint other than asking that his lights be turned off, again.

## 2019-11-29 NOTE — PROGRESS NOTES
"Patient has spent majority of the shift in his room. Patient came out briefly to use get coffee and meals. Patient continues to have poor adl's. Offered patient new scrubs but he declined. Patient denies suicidal ideation and self injurious thoughts. Denies anxiety despite rocking back and forth. Denies depression. Denies auditory and visual hallucinations. Med compliant.     Patient evaluation continues. Assessed mood,anxiety,thoughts and behavior.     Patient gradually progressing towards goals.    Patient is encouraged to participate in groups and assisted to develop healthy coping skills.     VS reviewed: /53   Pulse 86   Temp 96.9  F (36.1  C) (Oral)   Resp 18   Ht 1.702 m (5' 7\")   Wt 75.3 kg (166 lb)   SpO2 94%   BMI 26.00 kg/m      Length of stay: 26    Refer to daily team meeting notes for individualized plan of care. Nursing will continue to assess.      "

## 2019-11-30 PROCEDURE — 25000132 ZZH RX MED GY IP 250 OP 250 PS 637: Mod: GY | Performed by: PSYCHIATRY & NEUROLOGY

## 2019-11-30 PROCEDURE — 12400001 ZZH R&B MH UMMC

## 2019-11-30 PROCEDURE — 25000132 ZZH RX MED GY IP 250 OP 250 PS 637: Performed by: PSYCHIATRY & NEUROLOGY

## 2019-11-30 RX ADMIN — LORAZEPAM 2 MG: 1 TABLET ORAL at 17:58

## 2019-11-30 RX ADMIN — LORAZEPAM 1 MG: 1 TABLET ORAL at 22:12

## 2019-11-30 RX ADMIN — OLANZAPINE 15 MG: 15 TABLET, ORALLY DISINTEGRATING ORAL at 22:12

## 2019-11-30 RX ADMIN — LORAZEPAM 1 MG: 1 TABLET ORAL at 14:31

## 2019-11-30 RX ADMIN — LORAZEPAM 1 MG: 1 TABLET ORAL at 08:50

## 2019-11-30 RX ADMIN — BENZOCAINE, MENTHOL 1 LOZENGE: 15; 3.6 LOZENGE ORAL at 19:32

## 2019-11-30 NOTE — PROGRESS NOTES
11/29/19 1700   Group Therapy Session   Group Attendance   (No charge)   Total Time (minutes) 5   Group Type psychotherapeutic   Hermann came to the last 5 minutes of group. He presented as anxious. He reported looking forward to discharge soon. He listened while other group members shared final thoughts from the group.

## 2019-11-30 NOTE — PROGRESS NOTES
Hermann spent most of the evening out of his room either watching television or engaging few select peers in conversation.  Staff was able to engage him to play a game of cards for a short period of time.  While playing cards he appeared distracted at times, dealing more cards to some versus others and required cues to stay in the game.  Overall he appears more spontaneous versus what was observed a week ago; he appears more spontaneous with his responses, less guarded and less paranoid.  He continues to appear to respond to internal stimuli although he denies auditory and visual hallucinations.  His appearance continues to appear poor; dressed in hospital scrubs that have food stains on them and continues to decline staff's encouragement to change them with clean ones.  Only once this evening he was briefly observed rocking and holding his groin and it was unclear whether he had to use the bathroom at the moment.  During room checks staff had once again noticed a large BM left in his toilet and was immediately flushed.  He denies SI and SIB urges/thoughts.       11/29/19 2788   Sleep/Rest/Relaxation   Day/Evening Time Hours napping;resting in bed   Number of hours napping 1 hours   Number of hours resting in bed 1 hours   Behavioral Health   Hallucinations appears responding;denies / not responding to hallucinations   Thinking distractable;other (see comment);poor concentration  (Appears guarded.)   Orientation person: oriented;place: oriented   Memory short term   Insight poor   Judgement impaired   Eye Contact at examiner   Affect blunted, flat;tense   Mood anxious   Physical Appearance/Attire untidy;disheveled;appears stated age   Hygiene neglected grooming - unclean body, hair, teeth   Suicidality other (see comments)  (Currently denies SI.)   1. Wish to be Dead (Recent) No   2. Non-Specific Active Suicidal Thoughts (Recent) No   Self Injury other (see comment)  (Currently denies SIB urges/thoughts. )    Elopement   (No concernable statements or behaviors observed. )   Activity other (see comment)  (Somewhat social with peers throughout the evening.)   Speech coherent   Medication Sensitivity no stated side effects;no observed side effects   Psychomotor / Gait balanced;steady   Overt Aggression Scale   Verbal Aggression 0   Aggression against Property 0   Auto-Aggression 0   Physical Aggression 0   Overt Aggression Total Score 0   Coping/Psychosocial Interventions   Supportive Measures active listening utilized;counseling provided;relaxation techniques promoted;self-responsibility promoted;verbalization of feelings encouraged   Activities of Daily Living   Hygiene/Grooming handwashing;independent;prompts   Oral Hygiene prompts   Dress scrubs (behavioral health);prompts   Laundry unable to complete   Room Organization independent   Activity   Activity Assistance Provided independent

## 2019-11-30 NOTE — PROGRESS NOTES
11/30/19 0859   Vital Signs   Temp   (refused)   Temp src   (refused)   Pulse   (refused)   BP   (refused)

## 2019-11-30 NOTE — PROGRESS NOTES
11/30/19 1300   Behavioral Health   Hallucinations appears responding;denies / not responding to hallucinations   Thinking distractable   Orientation person: oriented;place: oriented;date: oriented;time: oriented   Memory short term   Insight poor   Judgement impaired   Eye Contact at examiner   Affect full range affect   Mood anxious   Physical Appearance/Attire untidy;disheveled   Hygiene neglected grooming - unclean body, hair, teeth   Suicidality   (Denies.)   1. Wish to be Dead (Recent) No   2. Non-Specific Active Suicidal Thoughts (Recent) No   Activity other (see comment)  (Out in milieu at times.)   Speech coherent   Medication Sensitivity no stated side effects;no observed side effects   Psychomotor / Gait balanced;steady

## 2019-11-30 NOTE — PROGRESS NOTES
Hermann refused his vital signs this afternoon. RN notified.        11/30/19 1103   Vital Signs   Temp   (Refused)   Pulse   (Refused)   BP   (Refused)

## 2019-12-01 PROCEDURE — 12400001 ZZH R&B MH UMMC

## 2019-12-01 PROCEDURE — 25000132 ZZH RX MED GY IP 250 OP 250 PS 637: Performed by: PSYCHIATRY & NEUROLOGY

## 2019-12-01 PROCEDURE — 25000132 ZZH RX MED GY IP 250 OP 250 PS 637: Mod: GY | Performed by: PSYCHIATRY & NEUROLOGY

## 2019-12-01 RX ADMIN — LORAZEPAM 1 MG: 1 TABLET ORAL at 09:01

## 2019-12-01 RX ADMIN — LORAZEPAM 1 MG: 1 TABLET ORAL at 14:23

## 2019-12-01 RX ADMIN — BENZOCAINE, MENTHOL 1 LOZENGE: 15; 3.6 LOZENGE ORAL at 00:02

## 2019-12-01 RX ADMIN — OLANZAPINE 15 MG: 15 TABLET, ORALLY DISINTEGRATING ORAL at 22:21

## 2019-12-01 RX ADMIN — LORAZEPAM 1 MG: 1 TABLET ORAL at 22:22

## 2019-12-01 ASSESSMENT — ACTIVITIES OF DAILY LIVING (ADL)
HYGIENE/GROOMING: PROMPTS
LAUNDRY: UNABLE TO COMPLETE
HYGIENE/GROOMING: HANDWASHING;PROMPTS
ORAL_HYGIENE: PROMPTS
LAUNDRY: UNABLE TO COMPLETE
ORAL_HYGIENE: PROMPTS
DRESS: SCRUBS (BEHAVIORAL HEALTH);INDEPENDENT
DRESS: SCRUBS (BEHAVIORAL HEALTH);PROMPTS

## 2019-12-01 NOTE — PROGRESS NOTES
Hermann refused his vital signs this afternoon. RN notified.        12/01/19 4683   Vital Signs   Temp   (Refused)   Pulse   (Refused)   BP   (Refused)

## 2019-12-01 NOTE — PROGRESS NOTES
"Pt was in the milieu. He declined to answer any of staff's questions, just repeating, \"I'm good, I'm fine.\"  Pt was encouraged to shower + change his clothes but just repeated, \"I'm good, I'm fine.\"  "

## 2019-12-01 NOTE — PLAN OF CARE
"    Hermann continues to make small incremental improvements in his psychosis, with still fairly regular periods of psychosis/catatonia where he stands in the yun staring, fondling himself at times.    It is unclear if he recognizes what he is doing, as they fondling generally occurs when he either appears agitated or catatonic.     Tonight just before dinner he became increasingly agitated, touching himself, so this writer offered and Hermann accepted ativan 2 mg. About an hour later, he was making logical statements and teasing this writer about being here; \"your still here.\"  He then did spend some time in the  Lounge watching tv. Night time meds brought into his room.  "

## 2019-12-01 NOTE — PROGRESS NOTES
12/01/19 0905   Vital Signs   Temp   (refused)   Temp src   (refused)   Pulse   (refused)   BP   (refused)

## 2019-12-02 PROCEDURE — 25000132 ZZH RX MED GY IP 250 OP 250 PS 637: Performed by: PSYCHIATRY & NEUROLOGY

## 2019-12-02 PROCEDURE — G0177 OPPS/PHP; TRAIN & EDUC SERV: HCPCS

## 2019-12-02 PROCEDURE — 12400001 ZZH R&B MH UMMC

## 2019-12-02 PROCEDURE — H2032 ACTIVITY THERAPY, PER 15 MIN: HCPCS

## 2019-12-02 PROCEDURE — 99233 SBSQ HOSP IP/OBS HIGH 50: CPT | Performed by: PSYCHIATRY & NEUROLOGY

## 2019-12-02 RX ORDER — OLANZAPINE 10 MG/2ML
10 INJECTION, POWDER, FOR SOLUTION INTRAMUSCULAR AT BEDTIME
Status: DISCONTINUED | OUTPATIENT
Start: 2019-12-02 | End: 2019-12-30 | Stop reason: HOSPADM

## 2019-12-02 RX ORDER — OLANZAPINE 10 MG/1
20 TABLET, ORALLY DISINTEGRATING ORAL AT BEDTIME
Status: DISCONTINUED | OUTPATIENT
Start: 2019-12-02 | End: 2019-12-30 | Stop reason: HOSPADM

## 2019-12-02 RX ADMIN — LORAZEPAM 1 MG: 1 TABLET ORAL at 20:34

## 2019-12-02 RX ADMIN — LORAZEPAM 1 MG: 1 TABLET ORAL at 09:50

## 2019-12-02 RX ADMIN — LORAZEPAM 1 MG: 1 TABLET ORAL at 14:49

## 2019-12-02 RX ADMIN — OLANZAPINE 20 MG: 10 TABLET, ORALLY DISINTEGRATING ORAL at 20:34

## 2019-12-02 ASSESSMENT — ACTIVITIES OF DAILY LIVING (ADL)
DRESS: INDEPENDENT
HYGIENE/GROOMING: INDEPENDENT
LAUNDRY: WITH SUPERVISION
HYGIENE/GROOMING: INDEPENDENT;PROMPTS
DRESS: INDEPENDENT
ORAL_HYGIENE: INDEPENDENT;PROMPTS

## 2019-12-02 NOTE — PROGRESS NOTES
Patient appeared to sleep for most of the AM again today, declining vital signs, breakfast, etc.  Out of his room in the PM for lunch and an afternoon group activity.  Overall, has presented as generally quiet, calm, and non-reactive to his surroundings.  Seemed to be doing much less standing around, staring, and talking to himself today as well.  Remains quite guarded on approach re his current MH status, again denying all current MH issues, concerns, and acuities.   Has presented no behavioral challenges on the unit so far today.   Davian Florence   12/02/2019 12/02/19 1417   Sleep/Rest/Relaxation   Day/Evening Time Hours napping;resting in bed   Number of hours napping 3 hours   Number of hours resting in bed 2 hours   Cognitive   Level Of Consciousness somnolent  (AM hours only)   Arousal Level arouses to voice   Orientation situation   Follows Commands slow to respond   Speech clear  (paucity)   Best Language 0 - No aphasia   Mood/Behavior flat affect;withdrawn   Behavioral Health   Hallucinations denies / not responding to hallucinations   Thinking delusional   Orientation situation, disoriented;person: oriented;place: oriented   Memory   (ZAHRA)   Insight denial of illness   Judgement impaired   Affect blunted, flat   Mood mood is calm   Physical Appearance/Attire disheveled;appears stated age;attire appropriate to age and situation   Suicidality other (see comments)  (denied SI)   1. Wish to be Dead (Recent) No   2. Non-Specific Active Suicidal Thoughts (Recent) No   Self Injury other (see comment)  (denied SIB urges)   Elopement   (no indicators noted)   Activity withdrawn   Speech clear;coherent   Overt Aggression Scale   Verbal Aggression 0   Aggression against Property 0   Auto-Aggression 0   Physical Aggression 0   Overt Aggression Total Score 0   Coping/Psychosocial   Verbalized Emotional State acceptance   Psycho Education   Type of Intervention 1:1 intervention   Response participates with  encouragement   Hours 0.5   Treatment Detail current MH status   Safety   Suicidality Status 15   Activities of Daily Living   Hygiene/Grooming independent   Dress independent   Room Organization independent   Groups   Details OT Clinic

## 2019-12-02 NOTE — PROGRESS NOTES
"[facility], [place]   Psychiatric Progress Note    Length of stay (days): 29        Interim History:   The patient's care was discussed with the treatment team during the daily team meeting and/or staff's chart notes were reviewed.  Staff report: no acute issues overnight.  Prominent disorganization continues and patient appears to be responding to internal stimuli.  Continues to have episodes of staring, though also continues to have moments of improved thought process and more linear conversations.  Eating meals.  Sleeping adequately.  Taking his medications. Attending group today.  Continues to consume excessive amounts of coffee.     On examination today, he focused again on discharge from the hospital. He said that he plans to go to a friend's home, but again was unable to provide name or address. He believed that it was September, 2019. He stated that he is in [place]. Cannot recall events leading to hospitalization. Did not remember this provider from previous meetings. He said that he feels \"better.\" He expressed frustration about length of stay and said that he is \"fine now.\" He understands that he is under a commitment with [facility]. He does feel Ativan and Zyprexa have been helpful. He denies side effects from these medications.  Frequent episodes of thought blocking throughout our meeting while appearing to respond to psychotic stimuli. He denies AH/VH, paranoia, SI, HI.          Medications:       LORazepam  1 mg Oral TID     OLANZapine zydis  20 mg Oral At Bedtime    Or     OLANZapine  10 mg Intramuscular At Bedtime          Allergies:   No Known Allergies       Labs:   No results found for this or any previous visit (from the past 24 hour(s)).       Psychiatric Examination:     /53   Pulse 86   Temp 96.9  F (36.1  C) (Oral)   Resp 18   Ht 1.702 m (5' 7\")   Wt 75.3 kg (166 lb)   SpO2 94%   BMI 26.00 kg/m    Weight is 166 lbs 0 oz  Body mass index is 26 " kg/m .  Orthostatic Vitals     None            Appearance: casually dressed and slightly unkempt  Attitude:  guarded  Eye Contact:  poor   Mood:  better  Affect:  intensity is flat  Speech:  decreased prosody  Psychomotor Behavior:  physical retardation  Throught Process:  Tight  Associations:  no loose associations  Thought Content:  no evidence of suicidal ideation or homicidal ideation and auditory hallucinations present  Insight:  limited  Judgement:  limited  Oriented to:  Person and place  Attention Span and Concentration:  limited  Recent and Remote Memory:  limited    Clinical Global Impressions  First:  Considering your total clinical experience with this particular patient population, how severe are the patient's symptoms at this time?: 7 (11/03/19 0907)  Compared to the patient's condition at the START of treatment, this patient's condition is:: 4 (11/03/19 0907)  Most recent:  Considering your total clinical experience with this particular patient population, how severe are the patient's symptoms at this time?: 7 (11/21/19 1524)  Compared to the patient's condition at the START of treatment, this patient's condition is:: 4 (11/21/19 1524)         Precautions:     Behavioral Orders   Procedures     Code 1 - Restrict to Unit     Routine Programming     As clinically indicated     Sexual precautions     Status 15     Every 15 minutes.          DIagnoses:     Schizophrenia, decompensated         Plan:     Increase Zyprexa to 20 mg at bedtime better address psychotic symptoms.  [facility] order is in place.  Currently on a trial of Ativan to determine if catatonia is complicating his presentation    Psychosocial treatments to be addressed with social work consult and groups.    Legal Status:  Patient is Committed    Disposition: To be determined    Ann Marie Woods MD  [facility] Psychiatry

## 2019-12-02 NOTE — PLAN OF CARE
"BEHAVIORAL TEAM DISCUSSION    Participants: Ann Marie Woods MD; Ramesh Narayan [facility]; Carmelina Ramos RN    Progress: Pt is on day 29 of hospital stay.  He remains due to continued psychotic symptoms that impair his ability to function and being under involuntary commitment with [facility] order.  Pt remains med compliant.  He is now taken 15 mg of Zyprexa which was started at the dose last week.  He continues on the trial of ativan.  Some nurses have noticed that he is more logical after a dose of the ativan.  However patient remains symptomatic.  He continues to have bouts of standing around in one spot for hours.  He is also seen rocking back and forth, fondling himself, and appears to be responding to internal stimuli.  He started talking last week, however most of his responses are \"It's good, I'm okay\".  He took a shower once, shaved and put on new scrubs.  He also changed his linens.  Since then staff have tried to encourage him to shower or change scrubs, but he declines.  In the past week staff have asked at least three times.  Pt has been standing at the nurses station.  He requests to meet with he  because he feels he is ready to discharge, however he is unable to come up with a logical plan.  He is back to drinking numerous cups of coffee during the day.  His appetite is adequate and his sleep is normal.  He met with his county  last week.  He also met with a financial counselor from the business office.  The completed a paper application.     Anticipated length of stay: unknown.     Continued Stay Criteria/Rationale:He remains due to continued psychotic symptoms that impair his ability to function and being under involuntary commitment with [facility] order.    Medical/Physical: no acute issues.     Precautions:   Behavioral Orders   Procedures    Code 1 - Restrict to Unit    Routine Programming     As clinically indicated    Sexual precautions    Status 15     Every 15 minutes. "     Plan: Increase Zyprexa to better address psychotic symptoms.  [facility]man order is in place.  Currently on a trial of Ativan to determine if catatonia is complicating his presentation     Psychosocial treatments to be addressed with social work consult and groups.    Legal Status:  Patient is Committed    Disposition: To be determined    Rationale for change in precautions or plan: no change.

## 2019-12-03 PROCEDURE — 99233 SBSQ HOSP IP/OBS HIGH 50: CPT | Performed by: PSYCHIATRY & NEUROLOGY

## 2019-12-03 PROCEDURE — 12400001 ZZH R&B MH UMMC

## 2019-12-03 PROCEDURE — G0177 OPPS/PHP; TRAIN & EDUC SERV: HCPCS

## 2019-12-03 PROCEDURE — 25000132 ZZH RX MED GY IP 250 OP 250 PS 637: Performed by: PSYCHIATRY & NEUROLOGY

## 2019-12-03 RX ADMIN — LORAZEPAM 1 MG: 1 TABLET ORAL at 08:33

## 2019-12-03 RX ADMIN — OLANZAPINE 20 MG: 10 TABLET, ORALLY DISINTEGRATING ORAL at 21:20

## 2019-12-03 RX ADMIN — LORAZEPAM 1 MG: 1 TABLET ORAL at 14:01

## 2019-12-03 RX ADMIN — LORAZEPAM 1 MG: 1 TABLET ORAL at 21:20

## 2019-12-03 ASSESSMENT — ACTIVITIES OF DAILY LIVING (ADL)
DRESS: SCRUBS (BEHAVIORAL HEALTH);INDEPENDENT
HYGIENE/GROOMING: PROMPTS
LAUNDRY: UNABLE TO COMPLETE
ORAL_HYGIENE: PROMPTS

## 2019-12-03 NOTE — PROGRESS NOTES
"[facility] Denies all mental health symptoms. Patient ate dinner and snacks. Continues to  place and rocks back and forth. Adl's are poor. Patient appears more clearer in her speech. Med compliant. Guarded and doesn't socialize with peers or staff.     Patient evaluation continues. Assessed mood,anxiety,thoughts and behavior.     Patient gradually progressing towards goals.    Patient is encouraged to participate in groups and assisted to develop healthy coping skills.     VS reviewed: /53   Pulse 86   Temp 96.9  F (36.1  C) (Oral)   Resp 18   Ht 1.702 m (5' 7\")   Wt 75.3 kg (166 lb)   SpO2 94%   BMI 26.00 kg/m      Length of stay: 29    Refer to daily team meeting notes for individualized plan of care. Nursing will continue to assess.      "

## 2019-12-03 NOTE — PROGRESS NOTES
12/02/19 2200   Therapeutic Recreation   Type of Intervention structured groups   Activity game   Response Participates with cues/redirection   Hours 1     Pt attended the Therapeutic Recreation group with focus on leisure participation, social engagement, and critical thinking. Pt occasionally participated in the group recreational intervention via a group game.  Pt stood away from the table for awhile, staring blankly in the distance. After a few cues about joining the group and sitting at the table, pt eventually sat down with the group. During the group activity, pt would often continue staring off during others' turns, but took two turns during the group, where he would ask questions to a guessing game. Showed progress in session goals. Pt mood was calm.

## 2019-12-03 NOTE — PROGRESS NOTES
"[facility], [place]   Psychiatric Progress Note    Length of stay (days): 30        Interim History:   The patient's care was discussed with the treatment team during the daily team meeting and/or staff's chart notes were reviewed.  Staff report: no acute issues overnight.  Prominent disorganization continues and patient appears to be responding to internal stimuli.  Continues to have episodes of staring, though also continues to have moments of improved thought process and more linear conversations.  Eating meals.  Sleeping adequately.  Taking his medications. Attending group today.  Continues to consume excessive amounts of coffee.     On examination today, he focused again on discharge from the hospital. He said that he \"wants to get the fuck out of here. It is getting really annoying!\" He said that he would go to a friend's down the street. He would not provide friend's name and does not know the address. He does not have a follow up plan. He was irritable, demanding, and frequently cursing during our meeting. As writer was walking out of his room, he said \"I am sorry for being rude. I just want to get the fuck out of here. I want to get out of here and have some fucking fun before I die!\" Exhibits very poor insight with regards to mental health, importance of close follow up, MH supports in community, etc. He denies side effects from these medications.  Frequent episodes of thought blocking throughout our meeting while appearing to respond to psychotic stimuli though less so than on admission. He denies AH/VH, paranoia, SI, HI.          Medications:       LORazepam  1 mg Oral TID     OLANZapine zydis  20 mg Oral At Bedtime    Or     OLANZapine  10 mg Intramuscular At Bedtime          Allergies:   No Known Allergies       Labs:   No results found for this or any previous visit (from the past 24 hour(s)).       Psychiatric Examination:     /53   Pulse 86   Temp 96.9  F (36.1 " " C) (Oral)   Resp 18   Ht 1.702 m (5' 7\")   Wt 75.3 kg (166 lb)   SpO2 94%   BMI 26.00 kg/m    Weight is 166 lbs 0 oz  Body mass index is 26 kg/m .  Orthostatic Vitals     None        Appearance: casually dressed and slightly unkempt  Attitude:  guarded  Eye Contact:  poor   Mood:  better  Affect:  intensity is flat  Speech:  decreased prosody  Psychomotor Behavior:  physical retardation  Throught Process:  Tight  Associations:  no loose associations  Thought Content:  no evidence of suicidal ideation or homicidal ideation and auditory hallucinations present  Insight:  limited  Judgement:  limited  Oriented to:  Person and place  Attention Span and Concentration:  limited  Recent and Remote Memory:  limited    Clinical Global Impressions  First:  Considering your total clinical experience with this particular patient population, how severe are the patient's symptoms at this time?: 7 (11/03/19 0907)  Compared to the patient's condition at the START of treatment, this patient's condition is:: 4 (11/03/19 0907)  Most recent:  Considering your total clinical experience with this particular patient population, how severe are the patient's symptoms at this time?: 7 (11/21/19 1524)  Compared to the patient's condition at the START of treatment, this patient's condition is:: 4 (11/21/19 1524)         Precautions:     Behavioral Orders   Procedures     Code 1 - Restrict to Unit     Routine Programming     As clinically indicated     Sexual precautions     Status 15     Every 15 minutes.          DIagnoses:     Schizophrenia, decompensated         Plan:     Continue current med regimen without changes. Increased Zyprexa to 20 mg at bedtime on 12/2 better address psychotic symptoms.  [facility] order is in place.      Currently on a trial of Ativan to determine if catatonia is complicating his presentation.    Psychosocial treatments to be addressed with social work consult and groups.    Legal Status:  Patient is Committed " with Redman in place.    Disposition: To be determined and pending clinical stabilization. Pt continues to demonstrate severe symptoms of psychosis and inability to care for self.    Ann Marie Woods MD  [facility] Psychiatry

## 2019-12-03 NOTE — PROGRESS NOTES
Hermann refused his vital signs this afternoon. RN notified.        12/03/19 2597   Vital Signs   Temp   (Refused)   Pulse   (Refused)   BP   (Refused)

## 2019-12-03 NOTE — PROGRESS NOTES
"Pt was in the milieu, mostly in the yun.  Attended no groups.  Pt denies anx, dep, SI, SIB.  \"I have a place to go.  I want to get out of here.  I can stay with some friends, so I have a safe place to go.\"  "

## 2019-12-03 NOTE — PLAN OF CARE
Patient attended OT groups for the first time today. He participated in 2 groups. Groups focused on mindfulness and stress reduction, habit building for independent application of coping skills, and creative pursuits for emotional expression and focus. He was alert and attentive for about 2/3 of the time. He was polite and addressed people kindly. He was focused intently and followed instructions. He demonstrated some independent problem solving. With prolonged attention after about an hour and 15 minutes, he began zoning out more, requiring some cues for initiation. He struggled to conceptualize broad ideas like mindfulness and why it was a focus of group and how it was helpful. He admitted that he did not complete meditation exercise because he was thinking about his project.

## 2019-12-03 NOTE — PROGRESS NOTES
"  Hermann woke up X1 when an emergency beeper went off on another unit and the bell was ringing. He did seem to understand this was an event on another unit. He asked for ; \" a big stack of crackers\". When asked what kind, he stated \"janet crackers.\" He then returned to his room and appeared to fall asleep.  "

## 2019-12-04 PROCEDURE — 25000132 ZZH RX MED GY IP 250 OP 250 PS 637: Performed by: PSYCHIATRY & NEUROLOGY

## 2019-12-04 PROCEDURE — 12400001 ZZH R&B MH UMMC

## 2019-12-04 PROCEDURE — 90853 GROUP PSYCHOTHERAPY: CPT

## 2019-12-04 PROCEDURE — G0177 OPPS/PHP; TRAIN & EDUC SERV: HCPCS

## 2019-12-04 RX ADMIN — TRAZODONE HYDROCHLORIDE 50 MG: 50 TABLET ORAL at 19:53

## 2019-12-04 RX ADMIN — LORAZEPAM 1 MG: 1 TABLET ORAL at 19:51

## 2019-12-04 RX ADMIN — LORAZEPAM 1 MG: 1 TABLET ORAL at 14:09

## 2019-12-04 RX ADMIN — NICOTINE POLACRILEX 8 MG: 4 GUM, CHEWING ORAL at 19:51

## 2019-12-04 RX ADMIN — LORAZEPAM 1 MG: 1 TABLET ORAL at 08:49

## 2019-12-04 RX ADMIN — OLANZAPINE 20 MG: 10 TABLET, ORALLY DISINTEGRATING ORAL at 19:51

## 2019-12-04 ASSESSMENT — ACTIVITIES OF DAILY LIVING (ADL)
LAUNDRY: UNABLE TO COMPLETE
DRESS: STREET CLOTHES;INDEPENDENT
ORAL_HYGIENE: INDEPENDENT
LAUNDRY: WITH SUPERVISION
ORAL_HYGIENE: INDEPENDENT
DRESS: INDEPENDENT
HYGIENE/GROOMING: INDEPENDENT
HYGIENE/GROOMING: PROMPTS

## 2019-12-04 NOTE — PLAN OF CARE
Patient participated in final group with a structured group activity. He was pleasant and somewhat attentive. He required cuing for understanding at almost every turn.

## 2019-12-04 NOTE — PLAN OF CARE
Patient participated on OT clinic. He was polite and prosocial with me and peers. He worked on a project, organized with materials and supplies and cleaned up thoroughly. He continued working on project and required direct cues to clean up, but he was compliant.

## 2019-12-04 NOTE — PROGRESS NOTES
12/04/19 0834   Vital Signs   Temp   (refused)   Temp src   (refused)   Resp   (refuseed)   BP   (refused)

## 2019-12-04 NOTE — PROGRESS NOTES
"Pt was in bed late in to the morning.  When he was up pt paced in the yun.  He denies anx, dep, SI, SIB.  \"I feel ready to go.  I can stay with a friend or you guys can help me find a place; maybe even an ERT's.  I'm just ready to go.\"  "

## 2019-12-04 NOTE — PROGRESS NOTES
patient spent the first half of the shift in the lounge with peers, withdrawn. No issues reported nor observed, with patient retiring to bed around 1900. He required few redirects to keep away from the desk and was not seen with his hands in his pants. No intense staring reported this shift.

## 2019-12-04 NOTE — PROGRESS NOTES
Hermann refused her vital signs this afternoon. RN notified.       12/04/19 1630   Vital Signs   Temp   (Refused)   Pulse   (Refused)   BP   (Refused)

## 2019-12-05 PROCEDURE — 12400001 ZZH R&B MH UMMC

## 2019-12-05 PROCEDURE — H2032 ACTIVITY THERAPY, PER 15 MIN: HCPCS

## 2019-12-05 PROCEDURE — G0177 OPPS/PHP; TRAIN & EDUC SERV: HCPCS

## 2019-12-05 PROCEDURE — 99233 SBSQ HOSP IP/OBS HIGH 50: CPT | Performed by: PSYCHIATRY & NEUROLOGY

## 2019-12-05 PROCEDURE — 25000132 ZZH RX MED GY IP 250 OP 250 PS 637: Mod: GY | Performed by: PSYCHIATRY & NEUROLOGY

## 2019-12-05 RX ADMIN — LORAZEPAM 1 MG: 1 TABLET ORAL at 08:04

## 2019-12-05 RX ADMIN — LORAZEPAM 1 MG: 1 TABLET ORAL at 21:19

## 2019-12-05 RX ADMIN — LORAZEPAM 1 MG: 1 TABLET ORAL at 14:11

## 2019-12-05 RX ADMIN — ALUMINUM HYDROXIDE, MAGNESIUM HYDROXIDE, AND DIMETHICONE 30 ML: 400; 400; 40 SUSPENSION ORAL at 22:58

## 2019-12-05 RX ADMIN — NICOTINE POLACRILEX 8 MG: 4 GUM, CHEWING ORAL at 20:03

## 2019-12-05 RX ADMIN — HYDROXYZINE HYDROCHLORIDE 25 MG: 25 TABLET, FILM COATED ORAL at 04:50

## 2019-12-05 RX ADMIN — ALUMINUM HYDROXIDE, MAGNESIUM HYDROXIDE, AND DIMETHICONE 30 ML: 400; 400; 40 SUSPENSION ORAL at 04:48

## 2019-12-05 RX ADMIN — OLANZAPINE 20 MG: 10 TABLET, ORALLY DISINTEGRATING ORAL at 21:19

## 2019-12-05 RX ADMIN — TRAZODONE HYDROCHLORIDE 50 MG: 50 TABLET ORAL at 21:21

## 2019-12-05 RX ADMIN — TRAZODONE HYDROCHLORIDE 50 MG: 50 TABLET ORAL at 22:59

## 2019-12-05 ASSESSMENT — ACTIVITIES OF DAILY LIVING (ADL)
HYGIENE/GROOMING: INDEPENDENT
HYGIENE/GROOMING: INDEPENDENT
DRESS: INDEPENDENT;SCRUBS (BEHAVIORAL HEALTH)
ORAL_HYGIENE: INDEPENDENT
DRESS: STREET CLOTHES;INDEPENDENT
LAUNDRY: WITH SUPERVISION
LAUNDRY: UNABLE TO COMPLETE
ORAL_HYGIENE: INDEPENDENT

## 2019-12-05 NOTE — PROGRESS NOTES
"[facility], [place]   Psychiatric Progress Note    Length of stay (days): 32        Interim History:   The patient's care was discussed with the treatment team during the daily team meeting and/or staff's chart notes were reviewed.  Staff report: no acute issues overnight.  Prominent disorganization continues and patient appears to be responding to internal stimuli.  Continues to have episodes of staring, though also continues to have moments of improved thought process and more linear conversations.  Eating meals.  Sleeping adequately.  Not showering regularly and appears disheveled. Very poor insight and judgment with an inability to integrate abstract concepts.Taking his medications. Attending groups.     On examination today, he focused again on discharge from the hospital. He said that he \"wants to get the fuck out of here. It is getting really frustrating! I did nothing wrong before I got here.\" He again said that he would go to a friend's down the street. He was unwilling to sign ROIs for his friend or for [facility]. He was irritable, demanding, and frequently cursing during our meeting. Exhibits very poor insight with regards to mental health, importance of close follow up, MH supports in community, etc. He denies side effects from these medications.  Frequent episodes of thought blocking throughout our meeting while appearing to respond to psychotic stimuli though less so than on admission. He denies AH/VH, paranoia, SI, HI.          Medications:       LORazepam  1 mg Oral TID     OLANZapine zydis  20 mg Oral At Bedtime    Or     OLANZapine  10 mg Intramuscular At Bedtime          Allergies:   No Known Allergies       Labs:   No results found for this or any previous visit (from the past 24 hour(s)).       Psychiatric Examination:     /53   Pulse 86   Temp 96.9  F (36.1  C) (Oral)   Resp 18   Ht 1.702 m (5' 7\")   Wt 75.3 kg (166 lb)   SpO2 94%   BMI 26.00 kg/m  "   Weight is 166 lbs 0 oz  Body mass index is 26 kg/m .  Orthostatic Vitals     None        Appearance: casually dressed and slightly unkempt  Attitude:  guarded  Eye Contact:  poor   Mood:  better  Affect:  intensity is flat  Speech:  decreased prosody  Psychomotor Behavior:  physical retardation  Throught Process:  Tight  Associations:  no loose associations  Thought Content:  no evidence of suicidal ideation or homicidal ideation and auditory hallucinations present  Insight:  limited  Judgement:  limited  Oriented to:  Person and place  Attention Span and Concentration:  limited  Recent and Remote Memory:  limited    Clinical Global Impressions  First:  Considering your total clinical experience with this particular patient population, how severe are the patient's symptoms at this time?: 7 (11/03/19 0907)  Compared to the patient's condition at the START of treatment, this patient's condition is:: 4 (11/03/19 0907)  Most recent:  Considering your total clinical experience with this particular patient population, how severe are the patient's symptoms at this time?: 7 (11/21/19 1524)  Compared to the patient's condition at the START of treatment, this patient's condition is:: 4 (11/21/19 1524)         Precautions:     Behavioral Orders   Procedures     Code 1 - Restrict to Unit     Routine Programming     As clinically indicated     Sexual precautions     Status 15     Every 15 minutes.          Diagnoses:     Schizophrenia, decompensated         Plan:     Continue current med regimen without changes. Increased Zyprexa to 20 mg at bedtime on 12/2 to better address psychotic symptoms.  [facility] order is in place.      Currently on a trial of Ativan to determine if catatonia is complicating his presentation.    Psychosocial treatments to be addressed with social work consult and groups.    Legal Status:  Patient is Committed with [facility]man in place.    Disposition: To be determined and pending clinical stabilization. Pt  continues to demonstrate severe symptoms of psychosis and inability to care for self.    Ann Marie Woods MD  [facility] Psychiatry

## 2019-12-05 NOTE — PLAN OF CARE
"Patient participated in OT clinic. He vented some frustration about being in the hospital and demonstrated no insight into this stay. \"I don't need to be here I was just wandering around the streets.\" \"I don't know if they lied in the court papers, but now they are debating if I can live by myself. I don't want to be locked up!\" Writer validated his frustrations and reasoned that while he is waiting he can participate in groups to help regulate his feelings. He sat and did some staring before initiating his continued projects. He was focused for the rest of group, finishing half of his project.  "

## 2019-12-05 NOTE — PROGRESS NOTES
"Pt was in the milieu in the morning.  He expressed his anger to staff re: being in the hospital.  \"I was just walking around on the street + the  picked me up + brought me here.  Now, the  Says I can't take care of myself + I need to go to a group home or some type of structured place!  I can't believe I'm being held against my will.\"  Pt was talk with + told the  Wanted to see him out of his room + participate.  Pt argued but then, went to group.  "

## 2019-12-05 NOTE — PROGRESS NOTES
Hermann refused his vital signs this afternoon. RN notified.       12/05/19 0600   Vital Signs   Temp   (Refused)   Pulse   (Refused)   BP   (Refused)

## 2019-12-05 NOTE — PROGRESS NOTES
"   12/04/19 1700   Group Therapy Session   Group Attendance attended group session   Total Time (minutes) 50   Group Type psychotherapeutic   Group Topic Covered emotions/expression   Patient Participation/Contribution cooperative with task;discussed personal experience with topic;listened actively   Psychotherapy group goal:  Identify and process feelings in-the-moment utilizing a \"feelings heart\" activity.   Hermann reported feeling, \"allright.\" He presented as anxious and quiet, affect flat. He participated in the activity and shared his responses. This writer lead a Progressive Muscle Relaxation activity at the end of the group, though he did not seem to be participating.  "

## 2019-12-05 NOTE — PROGRESS NOTES
"Patient has spent majority of the evening in [facility] area. Patient ate dinner and snacks. Attended art therapy group and completed a coloring sheet. Patient is brighter, making sense when he speaks, engages with staff and peers in conversation and makes jokes. Patient requested his medication on his own and asked for trazodone for sleep. Showered and changed scrubs. Denies SI and SIB. Denies anxiety and depression. Denies auditory and visual hallucinations. Continues to be distracted and preoccupied. Pleasant and cooperative on unit.     Patient evaluation continues. Assessed mood,anxiety,thoughts and behavior.     Patient gradually progressing towards goals.    Patient is encouraged to participate in groups and assisted to develop healthy coping skills.     VS reviewed: /53   Pulse 86   Temp 96.9  F (36.1  C) (Oral)   Resp 18   Ht 1.702 m (5' 7\")   Wt 75.3 kg (166 lb)   SpO2 94%   BMI 26.00 kg/m      Length of stay: 31    Refer to daily team meeting notes for individualized plan of care. Nursing will continue to assess.      "

## 2019-12-06 PROCEDURE — 90853 GROUP PSYCHOTHERAPY: CPT

## 2019-12-06 PROCEDURE — 12400001 ZZH R&B MH UMMC

## 2019-12-06 PROCEDURE — 99233 SBSQ HOSP IP/OBS HIGH 50: CPT | Performed by: PSYCHIATRY & NEUROLOGY

## 2019-12-06 PROCEDURE — 25000132 ZZH RX MED GY IP 250 OP 250 PS 637: Performed by: PSYCHIATRY & NEUROLOGY

## 2019-12-06 PROCEDURE — G0177 OPPS/PHP; TRAIN & EDUC SERV: HCPCS

## 2019-12-06 RX ORDER — TRAZODONE HYDROCHLORIDE 100 MG/1
200 TABLET ORAL AT BEDTIME
Status: DISCONTINUED | OUTPATIENT
Start: 2019-12-06 | End: 2019-12-30 | Stop reason: HOSPADM

## 2019-12-06 RX ADMIN — LORAZEPAM 1 MG: 1 TABLET ORAL at 09:01

## 2019-12-06 RX ADMIN — LORAZEPAM 1 MG: 1 TABLET ORAL at 14:40

## 2019-12-06 RX ADMIN — NICOTINE POLACRILEX 8 MG: 4 GUM, CHEWING ORAL at 19:14

## 2019-12-06 RX ADMIN — LORAZEPAM 1 MG: 1 TABLET ORAL at 21:25

## 2019-12-06 RX ADMIN — ALUMINUM HYDROXIDE, MAGNESIUM HYDROXIDE, AND DIMETHICONE 30 ML: 400; 400; 40 SUSPENSION ORAL at 23:50

## 2019-12-06 RX ADMIN — TRAZODONE HYDROCHLORIDE 200 MG: 100 TABLET ORAL at 21:26

## 2019-12-06 RX ADMIN — OLANZAPINE 20 MG: 10 TABLET, ORALLY DISINTEGRATING ORAL at 21:25

## 2019-12-06 ASSESSMENT — ACTIVITIES OF DAILY LIVING (ADL)
LAUNDRY: WITH SUPERVISION
DRESS: INDEPENDENT
ORAL_HYGIENE: INDEPENDENT
HYGIENE/GROOMING: PROMPTS

## 2019-12-06 NOTE — PROGRESS NOTES
Mildred Green, [facility] @[phone], left a vm stating that the pt has been calling frquently asking about discharge.  She wonders what the discharge plan is.    She returned my voice mail.  I stated we were waiting for medical assistance so we can refer the pt to an IRTS.   We discussed the fact  that the pt remains very symptomatic.  She asked if she could help with the MA application. I said there was a paper application one month ago.      I called Financial Counseling to see if the MA was active yet. It is not. It was sent on 11/25/19.

## 2019-12-06 NOTE — PROGRESS NOTES
"[facility], [place]   Psychiatric Progress Note    Length of stay (days): 33        Interim History:   The patient's care was discussed with the treatment team during the daily team meeting and/or staff's chart notes were reviewed.  Staff report: patient is actively participating in groups. He has been more forthcoming with staff with regards to symptoms as he notes that he began experiencing AH/VH in 2011. He made some hyper-Anabaptism statements and exhibited evidence of delusional thought content, though staff noted that he had improved insight. He has been cooperative, though irritable when discussing reasons for hospitalization and length of stay. At one point he told staff that he does not have a mental illness.    On examination today, pt is frequently audibly grinding his teeth and appears tense, however, affect is overall brighter. He said that he is ready for discharge. He is amenable with plan to discharge to an IRTS, but said \"Not one where it is all groups all the time.\" He said that he still does not understand why commitment was warranted as \"I was just outside waiting to figure out where to live. I can take care of myself as long as I have a place to live.\" Exhibits very poor insight with regards to mental health, importance of close follow up, MH supports in community, etc. He denies side effects from these medications.  Frequent episodes of thought blocking throughout our meeting while appearing to respond to psychotic stimuli though less so than on admission. He denies AH/VH, paranoia, SI, HI.          Medications:       LORazepam  1 mg Oral TID     OLANZapine zydis  20 mg Oral At Bedtime    Or     OLANZapine  10 mg Intramuscular At Bedtime          Allergies:   No Known Allergies       Labs:   No results found for this or any previous visit (from the past 24 hour(s)).       Psychiatric Examination:     /53   Pulse 86   Temp 96.9  F (36.1  C) (Oral)   Resp 18  " " Ht 1.702 m (5' 7\")   Wt 75.3 kg (166 lb)   SpO2 94%   BMI 26.00 kg/m    Weight is 166 lbs 0 oz  Body mass index is 26 kg/m .  Orthostatic Vitals     None        Appearance: casually dressed and less disheveled  Attitude:  guarded  Eye Contact:  poor   Mood:  better  Affect:  intensity is flat  Speech:  decreased prosody  Psychomotor Behavior:  physical retardation  Throught Process:  Tight  Associations:  no loose associations  Thought Content:  no evidence of suicidal ideation or homicidal ideation and auditory hallucinations present  Insight:  limited  Judgement:  limited  Oriented to:  Person and place  Attention Span and Concentration:  limited  Recent and Remote Memory:  limited    Clinical Global Impressions  First:  Considering your total clinical experience with this particular patient population, how severe are the patient's symptoms at this time?: 7 (11/03/19 0907)  Compared to the patient's condition at the START of treatment, this patient's condition is:: 4 (11/03/19 0907)  Most recent:  Considering your total clinical experience with this particular patient population, how severe are the patient's symptoms at this time?: 7 (11/21/19 1524)  Compared to the patient's condition at the START of treatment, this patient's condition is:: 4 (11/21/19 1524)         Precautions:     Behavioral Orders   Procedures     Code 1 - Restrict to Unit     Routine Programming     As clinically indicated     Sexual precautions     Status 15     Every 15 minutes.          Diagnoses:     Schizophrenia, decompensated         Plan:     Continue current med regimen without changes. Increased Zyprexa to 20 mg at bedtime on 12/2 to better address psychotic symptoms.  Redman order is in place.      Currently on a trial of Ativan to determine if catatonia is complicating his presentation.    Psychosocial treatments to be addressed with social work consult and groups.    Legal Status:  Patient is Committed with [facility] in " place.    Disposition: To be determined and pending clinical stabilization. Pt continues to demonstrate severe symptoms of psychosis and inability to care for self.    Ann Marie Woods MD  [facility] Psychiatry

## 2019-12-06 NOTE — PROGRESS NOTES
"Hermann was visible in the milieu for most of the evening. Pt was sociable with staff and peers, even played some cards with some of his peers. Pt joked with staff, and was quite social. Pt denied a check-in, but approached staff and said, \"do you want to hear about my journey since [id number]?\". Staff replied \"yes\". Pt then proceeded to tell staff about how he (in writer's interpretation) began noticing hallucinations in 1241-0501. Voices were telling him to \"rise for creation\" and he also had hallucinations of family members (such as being in the car when they were not). Pt stated \"they\" as the source of the hallucinations and pointed toward Orthodox origins, since they wanted him to \"rise for creation\". Pt stated they are coming out with a second bible. Pt denies any SI/SIB and MH symptoms, and thinks he is being challenged by \"them\". Pt spoke with staff for about 20+ minutes about his journey. Pt ate at mealtimes and was overall pleasant this evening.     12/05/19 2129   Behavioral Health   Hallucinations auditory   Thinking poor concentration;delusional;distractable   Orientation situation, disoriented   Memory confabulation   Insight poor   Judgement impaired   Eye Contact at examiner   Affect full range affect   Mood mood is calm   Physical Appearance/Attire untidy;disheveled   Hygiene neglected grooming - unclean body, hair, teeth   Suicidality other (see comments)  (Pt denies SI/SIB)   1. Wish to be Dead (Recent) No   2. Non-Specific Active Suicidal Thoughts (Recent) No   Self Injury other (see comment)  (Pt denies SI/SIB)   Elopement Statements about wanting to leave  (Pt jokingly asked, \"is it time to leave yet?\")   Activity other (see comment)  (Visible and sociable in milieu)   Speech pressured;rambling   Medication Sensitivity no stated side effects   Psychomotor / Gait balanced;steady   Psycho Education   Type of Intervention 1:1 intervention   Response refuses   Hours 0.5   Treatment Detail Check in "   Activities of Daily Living   Hygiene/Grooming independent   Oral Hygiene independent   Dress independent;scrubs (behavioral health)   Laundry with supervision   Room Organization independent

## 2019-12-06 NOTE — PROGRESS NOTES
12/05/19 1800   Art Therapy   Type of Intervention structured groups   Response participates with redirection   Hours 1   Treatment Detail   (Art Therapy metaphor/ strengths)   Goal- cope, express, regulate and reflect through Art Therapy directives    Outcome- pt was in group. He refused art but he was writing. His writing and his conversation were basically a rant about people misunderstanding him and he has no mental illness and couldn't understand why he was here unfairly. Writer commented that he appears to be improving. Writer had to ask him to not monopolize the conversation while validating him.  After writer asked him to let others speak, he was able to listen to the rest of the group present their art work and feelings.

## 2019-12-06 NOTE — PLAN OF CARE
Patient participated in 1 of 3 OT groups. He was attentive during social activity, participating and tracking. The answers he provided were relevant but somewhat limited. He was pleasant, read and interpreted questions on cards. He was grinding his teeth for the duration of group.

## 2019-12-07 PROCEDURE — 25000132 ZZH RX MED GY IP 250 OP 250 PS 637: Performed by: PSYCHIATRY & NEUROLOGY

## 2019-12-07 PROCEDURE — 25000132 ZZH RX MED GY IP 250 OP 250 PS 637: Mod: GY | Performed by: PSYCHIATRY & NEUROLOGY

## 2019-12-07 PROCEDURE — 12400001 ZZH R&B MH UMMC

## 2019-12-07 PROCEDURE — H2032 ACTIVITY THERAPY, PER 15 MIN: HCPCS

## 2019-12-07 RX ADMIN — LORAZEPAM 1 MG: 1 TABLET ORAL at 14:04

## 2019-12-07 RX ADMIN — TRAZODONE HYDROCHLORIDE 200 MG: 100 TABLET ORAL at 20:37

## 2019-12-07 RX ADMIN — NICOTINE POLACRILEX 8 MG: 4 GUM, CHEWING ORAL at 14:04

## 2019-12-07 RX ADMIN — ALUMINUM HYDROXIDE, MAGNESIUM HYDROXIDE, AND DIMETHICONE 30 ML: 400; 400; 40 SUSPENSION ORAL at 04:14

## 2019-12-07 RX ADMIN — OLANZAPINE 20 MG: 10 TABLET, ORALLY DISINTEGRATING ORAL at 20:37

## 2019-12-07 RX ADMIN — LORAZEPAM 1 MG: 1 TABLET ORAL at 20:37

## 2019-12-07 ASSESSMENT — ACTIVITIES OF DAILY LIVING (ADL)
ORAL_HYGIENE: INDEPENDENT
LAUNDRY: UNABLE TO COMPLETE
HYGIENE/GROOMING: INDEPENDENT
LAUNDRY: WITH SUPERVISION
ORAL_HYGIENE: INDEPENDENT
DRESS: SCRUBS (BEHAVIORAL HEALTH);INDEPENDENT
DRESS: STREET CLOTHES;INDEPENDENT
HYGIENE/GROOMING: INDEPENDENT

## 2019-12-07 NOTE — PROGRESS NOTES
Pt reported indigestion and heartburn, given maalox at 0000. Pt asked for more medication at 0200 but nothing was available. At 0330 pt was heard to be regurgitating twice in bathroom. Pt stated he has acid reflux. At 0400 pt got second dose of maalox. Pt encouraged to speak with MD about these concerns.

## 2019-12-07 NOTE — PROGRESS NOTES
"   12/06/19 1700   Group Therapy Session   Group Attendance attended group session   Total Time (minutes) 50   Group Type psychotherapeutic   Group Topic Covered other (see comments)   Patient Participation/Contribution cooperative with task;discussed personal experience with topic;disorganized   Patient participated in psychotherapy group which focused on personal resiliency by identifying individual strengths and positive coping skills.  Hermann reported feeling \"good.\" He presented as highly fidgety, ground his teeth, and internally distracted. He requested repetition several times and appeared to be in his own world while working on the written part of the activity.  He did participate and share several of his strengths and coping skills.   "

## 2019-12-07 NOTE — PROGRESS NOTES
"[facility] Patient changed his scrubs, ate dinner and snacks. Patient denies suicidal ideation and self injurious thoughts. Denies anxiety and depression. Patient denies auditory and visual hallucinations. Appears to be still responding to internal stimuli. More social with peers and staff. Med compliant.     Patient evaluation continues. Assessed mood,anxiety,thoughts and behavior.     Patient gradually progressing towards goals.    Patient is encouraged to participate in groups and assisted to develop healthy coping skills.     VS reviewed: /53   Pulse 86   Temp 96.9  F (36.1  C) (Oral)   Resp 18   Ht 1.702 m (5' 7\")   Wt 75.3 kg (166 lb)   SpO2 94%   BMI 26.00 kg/m      Length of stay: 33    Refer to daily team meeting notes for individualized plan of care. Nursing will continue to assess.      "

## 2019-12-07 NOTE — PROGRESS NOTES
"Pt was in the milieu only for meals; otherwise he kept to himself, in his room.  Pt denies anx, dep, SI, SIB.  Pt is very focussed on discharge.  \"I don't need to be here.  I just want to go.\"  "

## 2019-12-08 PROCEDURE — 25000132 ZZH RX MED GY IP 250 OP 250 PS 637: Performed by: PSYCHIATRY & NEUROLOGY

## 2019-12-08 PROCEDURE — 12400001 ZZH R&B MH UMMC

## 2019-12-08 RX ORDER — SIMETHICONE 80 MG
80 TABLET,CHEWABLE ORAL EVERY 6 HOURS PRN
Status: DISCONTINUED | OUTPATIENT
Start: 2019-12-08 | End: 2019-12-30 | Stop reason: HOSPADM

## 2019-12-08 RX ADMIN — LORAZEPAM 1 MG: 1 TABLET ORAL at 21:03

## 2019-12-08 RX ADMIN — TRAZODONE HYDROCHLORIDE 200 MG: 100 TABLET ORAL at 21:03

## 2019-12-08 RX ADMIN — SIMETHICONE 80 MG: 80 TABLET, CHEWABLE ORAL at 08:32

## 2019-12-08 RX ADMIN — OLANZAPINE 20 MG: 10 TABLET, ORALLY DISINTEGRATING ORAL at 21:03

## 2019-12-08 RX ADMIN — NICOTINE POLACRILEX 8 MG: 4 GUM, CHEWING ORAL at 16:19

## 2019-12-08 RX ADMIN — LORAZEPAM 1 MG: 1 TABLET ORAL at 08:32

## 2019-12-08 RX ADMIN — ALUMINUM HYDROXIDE, MAGNESIUM HYDROXIDE, AND DIMETHICONE 30 ML: 400; 400; 40 SUSPENSION ORAL at 03:22

## 2019-12-08 RX ADMIN — LORAZEPAM 1 MG: 1 TABLET ORAL at 14:14

## 2019-12-08 ASSESSMENT — ACTIVITIES OF DAILY LIVING (ADL)
HYGIENE/GROOMING: SHOWER;INDEPENDENT;PROMPTS
ORAL_HYGIENE: INDEPENDENT
LAUNDRY: UNABLE TO COMPLETE
DRESS: INDEPENDENT;SCRUBS (BEHAVIORAL HEALTH)
ORAL_HYGIENE: INDEPENDENT
HYGIENE/GROOMING: INDEPENDENT
DRESS: SCRUBS (BEHAVIORAL HEALTH);INDEPENDENT

## 2019-12-08 NOTE — PROGRESS NOTES
Patient slept most of the AM but has been visible and active in the milieu since getting up for lunch.  No program participation today, but has been appropriately engaged with peers socially - conversing, playing games, etc.  Mood has been generally calm and stable with bright, mood-congruent affect.  Has not appeared to be responding to internal stimuli today so far.  Other than declining his vitals (normal for him), patient has been cooperative with unit and personal care protocols, even showering with prompts.  No behavior management challenges today.  Patient continues to display lack of insight into his MH status, again denying all MH issues, concerns, and acuities.  Not as verbal today about and focused on his desire to discharge as he has been recently.  Seemed more relaxed and willing to live in the moment.   Davian Florence   12/08/2019 12/08/19 1343   Sleep/Rest/Relaxation   Day/Evening Time Hours napping;resting in bed   Number of hours napping 4 hours   Cognitive   Level Of Consciousness alert   Arousal Level arouses to voice   Orientation situation   Follows Commands yes   Speech clear;spontaneous   Best Language 0 - No aphasia   Mood/Behavior calm;cooperative;behavior appropriate to situation   Psycho Education   Type of Intervention 1:1 intervention   Response participates with encouragement   Hours 0.5   Treatment Detail current MH status   Safety   Suicidality Status 15   Activities of Daily Living   Hygiene/Grooming shower;independent;prompts   Oral Hygiene independent   Dress scrubs (behavioral health);independent   Room Organization independent   Groups   Details no participation

## 2019-12-08 NOTE — PROGRESS NOTES
12/07/19 [facility]   Therapeutic Recreation   Type of Intervention structured groups   Activity game   Response Participates with cues/redirection   Hours 1     Pt attended the structured Therapeutic Recreation group with a focus on leisure participation, stress reduction, and social engagement via a group game. Pt participated throughout full duration of group.  Showed progress in session goals. Pt mood was calm and was appropriate with interactions.  Pt picked 3 cards from the game that represented positive things about self and shared with the group. Throughout the group, pt often had delayed reactions and would stare blankly, needing several cues to regain his focus.

## 2019-12-08 NOTE — PROGRESS NOTES
Pt requested maalox at 0330. Immediately after pt asked for multiple snacks. Pt appears disheveled, malodorous, delayed responses at times, and distracted. Pt has slept off and on throughout the night.

## 2019-12-08 NOTE — PROGRESS NOTES
Hermann spent most of the shift visible in the milieu, either pacing the hallways or watching tv. Pt has been in a light mood, smiling, and even joking with staff and some of his peers. Pt attended group this evening. Pt continues to have body odor and appears untidy. Pt continues to drink decaffeinated coffee often. Pt ate at mealtimes, often making a bit of a mess when making a snack (4+jelly packets on toast). Pt has been quite flatulent. Pt denied a check-in, but did deny SI/SIB. Pt continues to have confabulation with his memories, but is capable of conversing with staff appropriately. Pt needs occasional reminder to keep his hand out of the back of his pants.     12/07/19 2052   Behavioral Health   Hallucinations denies / not responding to hallucinations   Thinking distractable;poor concentration   Orientation person: oriented;place: oriented   Memory confabulation   Insight poor   Judgement impaired   Eye Contact at examiner   Affect full range affect   Mood mood is calm   Physical Appearance/Attire disheveled;untidy   Hygiene body odor;neglected grooming - unclean body, hair, teeth   Suicidality other (see comments)  (Pt denies SI/SIB)   1. Wish to be Dead (Recent) No   2. Non-Specific Active Suicidal Thoughts (Recent) No   Self Injury other (see comment)  (Pt denies SI/SIB)   Elopement Statements about wanting to leave   Activity other (see comment)  (Pt visible and sociable in the milieu)   Speech clear;coherent   Medication Sensitivity no stated side effects   Psychomotor / Gait balanced;steady   Psycho Education   Type of Intervention 1:1 intervention   Response refuses   Hours 0.5   Treatment Detail Check in   Activities of Daily Living   Hygiene/Grooming independent   Oral Hygiene independent   Dress scrubs (behavioral health);independent   Laundry with supervision   Room Organization independent   Activity   Activity Assistance Provided independent

## 2019-12-09 PROCEDURE — 25000132 ZZH RX MED GY IP 250 OP 250 PS 637: Performed by: PSYCHIATRY & NEUROLOGY

## 2019-12-09 PROCEDURE — 99232 SBSQ HOSP IP/OBS MODERATE 35: CPT | Performed by: PSYCHIATRY & NEUROLOGY

## 2019-12-09 PROCEDURE — 12400001 ZZH R&B MH UMMC

## 2019-12-09 RX ADMIN — LORAZEPAM 1 MG: 1 TABLET ORAL at 08:28

## 2019-12-09 RX ADMIN — LORAZEPAM 1 MG: 1 TABLET ORAL at 21:03

## 2019-12-09 RX ADMIN — LORAZEPAM 1 MG: 1 TABLET ORAL at 14:16

## 2019-12-09 RX ADMIN — TRAZODONE HYDROCHLORIDE 200 MG: 100 TABLET ORAL at 21:03

## 2019-12-09 RX ADMIN — OLANZAPINE 20 MG: 10 TABLET, ORALLY DISINTEGRATING ORAL at 21:03

## 2019-12-09 RX ADMIN — NICOTINE POLACRILEX 8 MG: 4 GUM, CHEWING ORAL at 18:35

## 2019-12-09 ASSESSMENT — ACTIVITIES OF DAILY LIVING (ADL)
DRESS: INDEPENDENT;PROMPTS
HYGIENE/GROOMING: PROMPTS
ORAL_HYGIENE: PROMPTS
LAUNDRY: UNABLE TO COMPLETE

## 2019-12-09 NOTE — PROGRESS NOTES
"Pt refused to check-in with writer. He only stated, \"I'm good.\" He denies all mental health symptoms and denied any concerns for writer. Pt was observed in the lounge watching T.V. while pacing, pacing the hallways, and was mildly social with select peers. Overall, he was appropriate with peers and staff this evening. He continues to stare at people for periods of time, but then seems to become preoccupied with internal stimuli. He will suddenly laugh and shake his head.   "

## 2019-12-09 NOTE — PROGRESS NOTES
Writer called [facility] Probation and parole office, left voice mail requesting a return call.[phone].

## 2019-12-09 NOTE — PLAN OF CARE
Pt has been visible in the unit, affect is full range and even bright at times.  Pt reports sleep and appetite as being good.  Pt has been med compliant, no medication side effects reported or noted.  Pt agreed to sign BRAYAN today.  Pt has been appropriate in his interaction with others.  Attended and participated in groups today.  Will continue to assess.

## 2019-12-09 NOTE — PROGRESS NOTES
Writer met with Pt to obtain BRAYAN for family, Probation and parole and [facility].  Pt signed parent BRAYAN and Probation and Sisters.  Pt said he was on commitment a long time ago in [facility] and that case is closed and he does not want to sign a release.  Pt also asked about being discharged today.  Told him he will meet with his physician and physician will discuss plans with him and if he decides on discharge he will talk with the Team.  Discussed discahrge plans, Pt asked about IRTS.  Told him his MA is not active and he will need that for IRTS placement.  [facility] and he has been there in the past.

## 2019-12-09 NOTE — PLAN OF CARE
BEHAVIORAL TEAM DISCUSSION    Participants: LASHANDA Pérez, Percy Cordova MD, Dalia Salinas, RN  Progress: Minimal improvement  Anticipated length of stay: Unknown  Continued Stay Criteria/Rationale: Pt is not stable, he is  very ill and needs an IRTS placemnt  Medical/Physical: none  Precautions:   Behavioral Orders   Procedures    Code 1 - Restrict to Unit    Routine Programming     As clinically indicated    Sexual precautions    Status 15     Every 15 minutes.     Plan: continue medication management, CTC will work on discharge planning.  Rationale for change in precautions or plan: No change

## 2019-12-09 NOTE — PROGRESS NOTES
[facility]  Phone: [phone]  Fax: [phone]    She does not think [facility] would be appropriate for Pt.  We discussed his need of IRTS placement and lack of medical assistance.  Waiting to hear from financial counseling and will call Mildred back with where they are with that, and if needed she can make some calls.

## 2019-12-09 NOTE — PROGRESS NOTES
"[facility], [place]   Psychiatric Progress Note    Length of stay (days): 36        Interim History:   The patient's care was discussed with the treatment team during the daily team meeting and/or staff's chart notes were reviewed.  Staff report: No acute issues.  He is attending some groups, converses spontaneously, takes his medications, eats his meals, and is sleeping well.  No hostile or self-injurious behaviors reported.    He mostly focused on discussing plans for discharge.  He reports that he is doing well and denies any side effects to his medications.  He denied psychotic symptoms.  He denied depressed mood.  He denied suicidal and homicidal thoughts.  He reports sleeping well and eating adequately.         Medications:       LORazepam  1 mg Oral TID     OLANZapine zydis  20 mg Oral At Bedtime    Or     OLANZapine  10 mg Intramuscular At Bedtime     traZODone  200 mg Oral At Bedtime          Allergies:   No Known Allergies       Labs:   No results found for this or any previous visit (from the past 24 hour(s)).       Psychiatric Examination:     /53   Pulse 86   Temp 96.9  F (36.1  C) (Oral)   Resp 18   Ht 1.702 m (5' 7\")   Wt 75.3 kg (166 lb)   SpO2 94%   BMI 26.00 kg/m    Weight is 166 lbs 0 oz  Body mass index is 26 kg/m .  Orthostatic Vitals     None        Appearance: casually dressed and less disheveled  Attitude:  guarded  Eye Contact:  fair  Mood:  better  Affect:  intensity is flat  Speech:  clear, coherent  Psychomotor Behavior:  no evidence of tardive dyskinesia, dystonia, or tics  Throught Process:  linear  Associations:  no loose associations  Thought Content:  no evidence of suicidal ideation or homicidal ideation, no evidence of psychotic thought and auditory hallucinations present  Insight:  limited  Judgement:  limited  Oriented to:  Person and place  Attention Span and Concentration:  fair  Recent and Remote Memory:  fair    Clinical Global " Impressions  First:  Considering your total clinical experience with this particular patient population, how severe are the patient's symptoms at this time?: 7 (11/03/19 0907)  Compared to the patient's condition at the START of treatment, this patient's condition is:: 4 (11/03/19 0907)  Most recent:  Considering your total clinical experience with this particular patient population, how severe are the patient's symptoms at this time?: 7 (11/21/19 1524)  Compared to the patient's condition at the START of treatment, this patient's condition is:: 4 (11/21/19 1524)         Precautions:     Behavioral Orders   Procedures     Code 1 - Restrict to Unit     Routine Programming     As clinically indicated     Sexual precautions     Status 15     Every 15 minutes.          Diagnoses:     Schizophrenia, decompensated         Plan:     Continue current med regimen without changes. Increased Zyprexa to 20 mg at bedtime on 12/2 to better address psychotic symptoms.  [facility] order is in place.      Currently on a trial of Ativan to determine if catatonia is complicating his presentation.    Psychosocial treatments to be addressed with social work consult and groups.    Legal Status:  Patient is Committed with Redman in place.    Disposition: To be determined and pending clinical stabilization. Pt continues to demonstrate severe symptoms of psychosis and inability to care for self.

## 2019-12-10 PROCEDURE — 25000132 ZZH RX MED GY IP 250 OP 250 PS 637: Performed by: PSYCHIATRY & NEUROLOGY

## 2019-12-10 PROCEDURE — G0177 OPPS/PHP; TRAIN & EDUC SERV: HCPCS

## 2019-12-10 PROCEDURE — 12400001 ZZH R&B MH UMMC

## 2019-12-10 PROCEDURE — 99232 SBSQ HOSP IP/OBS MODERATE 35: CPT | Performed by: PSYCHIATRY & NEUROLOGY

## 2019-12-10 RX ADMIN — OLANZAPINE 20 MG: 10 TABLET, ORALLY DISINTEGRATING ORAL at 21:12

## 2019-12-10 RX ADMIN — LORAZEPAM 1 MG: 1 TABLET ORAL at 08:55

## 2019-12-10 RX ADMIN — TRAZODONE HYDROCHLORIDE 200 MG: 100 TABLET ORAL at 21:12

## 2019-12-10 RX ADMIN — LORAZEPAM 1 MG: 1 TABLET ORAL at 21:12

## 2019-12-10 RX ADMIN — LORAZEPAM 1 MG: 1 TABLET ORAL at 14:39

## 2019-12-10 ASSESSMENT — ACTIVITIES OF DAILY LIVING (ADL)
DRESS: INDEPENDENT
DRESS: SCRUBS (BEHAVIORAL HEALTH);INDEPENDENT
LAUNDRY: UNABLE TO COMPLETE
HYGIENE/GROOMING: INDEPENDENT
ORAL_HYGIENE: INDEPENDENT
HYGIENE/GROOMING: INDEPENDENT
ORAL_HYGIENE: INDEPENDENT

## 2019-12-10 NOTE — PLAN OF CARE
Pt was out of his room all shift. He sat with peers at a dining room table but did not speak with them. He did engage in conversation with writer when approached. Displayed full range affect, smiled. Answers were brief, stated he had a good day and he was happy he woke up. He was not observed to be responding to hallucinations today, he spent time pacing in the hallway or walking back and forth or rocking in front of the tv. He is noted to be grinding his teeth and clenching his jaw. He occasionally displays a quick tic in his shoulders.    No paranoid of delusional statements noted in responses to questions. Denies SI/HI.   Declined vitals  Length of stay: 36    Assessment of mood, thought process, response to medications and potential side effects continues.     Patient encouraged to participate in therapeutic groups and develop self-care skills.     Appropriate precautions maintained.

## 2019-12-10 NOTE — PROGRESS NOTES
Pt woke fewer times this shift. Pt continues to neglect cleaning his messes; garbage and leftover food from a snack left in dining room with food and milk all over the floor. Staff have attempted to remind pt but tasks remain incomplete. Pt observed to have a few delayed responses and occasional staring into space and/or at speaker.

## 2019-12-10 NOTE — PROGRESS NOTES
"  Pt was in the milieu.  Attended no groups.  Pt denies anx, dep, SI, SIB.  \"There is nothing wrong with me.  I can find a place to stay.  I just don't want to be here any more.\"  Pt denies have any mental health issues.  Pt began sneezing loudly while walking in the yun.  Received several reminders to cover his sneezes.   "

## 2019-12-11 PROCEDURE — 99232 SBSQ HOSP IP/OBS MODERATE 35: CPT | Performed by: PSYCHIATRY & NEUROLOGY

## 2019-12-11 PROCEDURE — 12400001 ZZH R&B MH UMMC

## 2019-12-11 PROCEDURE — 25000132 ZZH RX MED GY IP 250 OP 250 PS 637: Mod: GY | Performed by: PSYCHIATRY & NEUROLOGY

## 2019-12-11 RX ADMIN — TRAZODONE HYDROCHLORIDE 200 MG: 100 TABLET ORAL at 20:21

## 2019-12-11 RX ADMIN — LORAZEPAM 1 MG: 1 TABLET ORAL at 09:08

## 2019-12-11 RX ADMIN — LORAZEPAM 1 MG: 1 TABLET ORAL at 13:48

## 2019-12-11 RX ADMIN — LORAZEPAM 1 MG: 1 TABLET ORAL at 20:21

## 2019-12-11 RX ADMIN — OLANZAPINE 20 MG: 10 TABLET, ORALLY DISINTEGRATING ORAL at 20:21

## 2019-12-11 ASSESSMENT — ACTIVITIES OF DAILY LIVING (ADL)
DRESS: SCRUBS (BEHAVIORAL HEALTH)
LAUNDRY: UNABLE TO COMPLETE
LAUNDRY: UNABLE TO COMPLETE
HYGIENE/GROOMING: PROMPTS
ORAL_HYGIENE: INDEPENDENT
HYGIENE/GROOMING: INDEPENDENT
DRESS: SCRUBS (BEHAVIORAL HEALTH)
ORAL_HYGIENE: INDEPENDENT

## 2019-12-11 NOTE — PROGRESS NOTES
Hermann refused his vital signs this afternoon. RN notified.       12/11/19 1643   Vital Signs   Temp   (Refused)   Pulse   (Refused)   BP   (Refused)

## 2019-12-11 NOTE — PROGRESS NOTES
12/10/19 [facility]   Therapeutic Recreation   Type of Intervention structured groups   Activity game   Response Participates, initiates socially appropriate   Hours 0.5     Pt actively participated in a structured Therapeutic Recreation group with a focus on leisure participation, stress reduction, and social engagement via a group game. Pt remained focused and engaged throughout full duration of group.  Showed progress in session goals. Pt mood was calm and was appropriate with interactions. At times, pt was grinding his teeth hard enough where this writer could hear. Pt also seemed restless, fidgeting or moving around throughout his time in group. Pt would occasionally stare blankly, but would regain focus without any extra prompts.

## 2019-12-11 NOTE — PROGRESS NOTES
I am being asked about pt's medical assistance application. Here is the latest financial update.  12/9/19 note by Grace Mejia.   Called [employer]. Per rep, has andre faxed on 11/25. Stated it has  not yet been worked but rep said she would put it in her workqueue to get  started. I called and LVM for Debbie on Ramesh's old number and LVM with update. Stated that Pt could call [facility] and request expedited processing if there's a need.    Pt approached me in the hallway, looked at my badge and asked me if I was his . I explained that I was covering for his team. I gave him the update as above.

## 2019-12-11 NOTE — PLAN OF CARE
Pt presents with blunted, flat and irritable mood. Denies SI/SIB and hallucinations. Denies anxiety and depression. Pt did not eat breakfast, stated that he did not wish to eat. Spent day pacing the hallways. Med compliant. No other concerns or complaints at this time.

## 2019-12-11 NOTE — PROGRESS NOTES
Pt in and out of [facility]. Pt. Attended OT, AA and community meetings.  Pt denies SI.SIB. Pt is awaiting discharge.     12/10/19 2212   Behavioral Health   Hallucinations denies / not responding to hallucinations   Thinking distractable   Orientation person: oriented;place: oriented   Memory baseline memory   Insight poor   Judgement impaired   Eye Contact at examiner   Affect blunted, flat   Mood mood is calm   Self Injury other (see comment)  (pt denies)   Activity isolative;other (see comment)  (attended AA)   Activities of Daily Living   Hygiene/Grooming independent   Oral Hygiene independent   Dress independent   Room Organization independent

## 2019-12-11 NOTE — PLAN OF CARE
Patient participated in OT clinic. He completed coloring task that he has been working on, creatively planning a card for his daughter for birthday and Tillar. He was polite and socially appropriate.

## 2019-12-11 NOTE — PROGRESS NOTES
"[facility], [place]   Psychiatric Progress Note    Length of stay (days): 38        Interim History:   The patient's care was discussed with the treatment team during the daily team meeting and/or staff's chart notes were reviewed.  Staff report: No acute issues.  He is attending some groups, converses spontaneously, takes his medications, eats his meals, and is sleeping well.  No hostile or self-injurious behaviors reported.    He mostly focused on discussing plans for discharge.  He reports that he is doing well and denies any side effects to his medications.  He denied psychotic symptoms.  He denied depressed mood.  He denied suicidal and homicidal thoughts.  He reports sleeping well and eating adequately.         Medications:       LORazepam  1 mg Oral TID     OLANZapine zydis  20 mg Oral At Bedtime    Or     OLANZapine  10 mg Intramuscular At Bedtime     traZODone  200 mg Oral At Bedtime          Allergies:   No Known Allergies       Labs:   No results found for this or any previous visit (from the past 24 hour(s)).       Psychiatric Examination:     /53   Pulse 86   Temp 96.9  F (36.1  C) (Oral)   Resp 18   Ht 1.702 m (5' 7\")   Wt 75.3 kg (166 lb)   SpO2 94%   BMI 26.00 kg/m    Weight is 166 lbs 0 oz  Body mass index is 26 kg/m .  Orthostatic Vitals     None        Appearance: casually dressed and less disheveled  Attitude:  guarded  Eye Contact:  fair  Mood:  better  Affect:  intensity is flat  Speech:  clear, coherent  Psychomotor Behavior:  no evidence of tardive dyskinesia, dystonia, or tics  Throught Process:  linear  Associations:  no loose associations  Thought Content:  no evidence of suicidal ideation or homicidal ideation, no evidence of psychotic thought and auditory hallucinations present  Insight:  limited  Judgement:  limited  Oriented to:  Person and place  Attention Span and Concentration:  fair  Recent and Remote Memory:  fair    Clinical Global " Impressions  First:  Considering your total clinical experience with this particular patient population, how severe are the patient's symptoms at this time?: 7 (11/03/19 0907)  Compared to the patient's condition at the START of treatment, this patient's condition is:: 4 (11/03/19 0907)  Most recent:  Considering your total clinical experience with this particular patient population, how severe are the patient's symptoms at this time?: 7 (11/21/19 1524)  Compared to the patient's condition at the START of treatment, this patient's condition is:: 4 (11/21/19 1524)         Precautions:     Behavioral Orders   Procedures     Code 1 - Restrict to Unit     Routine Programming     As clinically indicated     Sexual precautions     Status 15     Every 15 minutes.          Diagnoses:     Schizophrenia, decompensated         Plan:     Continue current med regimen without changes. Increased Zyprexa to 20 mg at bedtime on 12/2 to better address psychotic symptoms.  Redman order is in place.      Currently on a trial of Ativan to determine if catatonia is complicating his presentation.    Psychosocial treatments to be addressed with social work consult and groups.    Legal Status:  Patient is Committed with Redman in place.    Disposition: Pursuing intensive residential treatment services however the patient will require active medical assistance to gain admission to a treatment facility.  The business office will be helping him apply for medical assistance.  Once approved, applications will be submitted to treatment facilities.

## 2019-12-12 PROCEDURE — 12400001 ZZH R&B MH UMMC

## 2019-12-12 PROCEDURE — 25000132 ZZH RX MED GY IP 250 OP 250 PS 637: Performed by: PSYCHIATRY & NEUROLOGY

## 2019-12-12 PROCEDURE — 99232 SBSQ HOSP IP/OBS MODERATE 35: CPT | Performed by: PSYCHIATRY & NEUROLOGY

## 2019-12-12 PROCEDURE — H2032 ACTIVITY THERAPY, PER 15 MIN: HCPCS

## 2019-12-12 PROCEDURE — G0177 OPPS/PHP; TRAIN & EDUC SERV: HCPCS

## 2019-12-12 RX ORDER — RAMELTEON 8 MG/1
8 TABLET ORAL AT BEDTIME
Status: DISCONTINUED | OUTPATIENT
Start: 2019-12-12 | End: 2019-12-30 | Stop reason: HOSPADM

## 2019-12-12 RX ADMIN — LORAZEPAM 1 MG: 1 TABLET ORAL at 08:22

## 2019-12-12 RX ADMIN — TRAZODONE HYDROCHLORIDE 200 MG: 100 TABLET ORAL at 20:55

## 2019-12-12 RX ADMIN — NICOTINE POLACRILEX 8 MG: 4 GUM, CHEWING ORAL at 18:32

## 2019-12-12 RX ADMIN — RAMELTEON 8 MG: 8 TABLET, FILM COATED ORAL at 20:56

## 2019-12-12 RX ADMIN — ALUMINUM HYDROXIDE, MAGNESIUM HYDROXIDE, AND DIMETHICONE 30 ML: 400; 400; 40 SUSPENSION ORAL at 05:00

## 2019-12-12 RX ADMIN — LORAZEPAM 1 MG: 1 TABLET ORAL at 14:17

## 2019-12-12 RX ADMIN — OLANZAPINE 20 MG: 10 TABLET, ORALLY DISINTEGRATING ORAL at 20:55

## 2019-12-12 RX ADMIN — LORAZEPAM 1 MG: 1 TABLET ORAL at 20:55

## 2019-12-12 ASSESSMENT — ACTIVITIES OF DAILY LIVING (ADL)
HYGIENE/GROOMING: INDEPENDENT;PROMPTS
ORAL_HYGIENE: INDEPENDENT
DRESS: INDEPENDENT
LAUNDRY: WITH SUPERVISION
HYGIENE/GROOMING: INDEPENDENT
ORAL_HYGIENE: INDEPENDENT;PROMPTS
DRESS: INDEPENDENT
LAUNDRY: WITH SUPERVISION

## 2019-12-12 NOTE — PLAN OF CARE
"Problem: OT General Care Plan  Goal: OT Goal 1    Pt attended 1 out of 3 OT groups offered. Pt actively participated in a structured occupational therapy group with a discussion focused on various mental health topics, including mental health management, social situations, healthy support systems, healthy mind/body, and activities of daily living. Pt responded to prompts thoughtfully and appropriately. He shared that he enjoys being active, and used to enjoy playing sports. He identified his family as supportive. He spontaneously asked group members \"do you think homeless people contribute to society?\" Unclear what prompted this question. He was observed frequently grinding his teeth. Pleasant, cooperative, and engaged throughout this group.     "

## 2019-12-12 NOTE — PROGRESS NOTES
Joey from [facility] called to try to reach  a Team 1 CTC (Silvana or Marisel) at 3PM to confirm that he received the referral today. He would like 2 things.  1 - please send over the commitment and Redman paperwork.  2 - place have the CTC call him to have a clinical conversation about how he's doing.    Joey did notice that the medical assistance was pending.

## 2019-12-12 NOTE — PROGRESS NOTES
Writer spoke with J Carlos ([id number]) with the business office to check status of MA application. J Carlos thought it should be approved in the next couple of days. She will recheck on Monday. Writer spoke with Sarah who stated to send the referral and put 'MA pending' on the face.      Writer faxed referrals to:  Sarah [phone] Fax:[phone]    [facility]: [phone] Fax: [phone]    [facility]: [phone] Fax: [phone]

## 2019-12-12 NOTE — PROGRESS NOTES
Pt was interactive and responding appropriately. Pt was encouraged to continue to talk with the treatment team. Pt was concerned about being d/c'ed and finding out about ERT's. He attended one group today. Overall, staff has no concerns. Amanda Cruz 12/12/19 12/12/19 1200   Sleep/Rest/Relaxation   Sleep/Rest/Relaxation awake   Cognitive   Level Of Consciousness alert   Orientation oriented x 4   Follows Commands yes   Speech fluent   Mood/Behavior calm   Behavioral Health   Hallucinations denies / not responding to hallucinations   Thinking intact   Orientation person: oriented;place: oriented;date: oriented;time: oriented   Memory baseline memory   Insight poor   Judgement intact   Eye Contact at examiner   Affect full range affect   Mood mood is calm   Physical Appearance/Attire attire appropriate to age and situation   Hygiene other (see comment)  (basic grooming)   Suicidality other (see comments)  (denies thoughts and actions.)   1. Wish to be Dead (Recent) No   2. Non-Specific Active Suicidal Thoughts (Recent) No   Self Injury other (see comment)  (denies)   Elopement Statements about wanting to leave   Activity restless   Speech coherent   Medication Sensitivity no observed side effects;no stated side effects   Psychomotor / Gait steady;balanced   Overt Aggression Scale   Verbal Aggression 0   Aggression against Property 0   Auto-Aggression 0   Physical Aggression 0   Overt Aggression Total Score 0   Coping/Psychosocial Interventions   Supportive Measures goal setting facilitated;self-care encouraged;other (see comments)  (encouraged to talk with treatment team)   Psycho Education   Type of Intervention 1:1 intervention   Response participates, initiates socially appropriate   Hours 0.5   Treatment Detail   (preceiving feedback)   Safety   Suicidality Status 15   Sexual status 15   Activities of Daily Living   Hygiene/Grooming independent   Oral Hygiene independent   Dress independent   Laundry with  supervision   Room Organization independent   Activity   Activity Assistance Provided independent

## 2019-12-12 NOTE — PROGRESS NOTES
"[facility], [place]   Psychiatric Progress Note    Length of stay (days): 39        Interim History:   The patient's care was discussed with the treatment team during the daily team meeting and/or staff's chart notes were reviewed.  Staff report: No acute issues.  He is attending some groups, converses spontaneously, takes his medications, eats his meals, and is sleeping well.  No hostile or self-injurious behaviors reported.    He mostly focused on discussing plans for discharge.  He reports that he is doing well and denies any side effects to his medications.  He denied psychotic symptoms.  He denied depressed mood.  He denied suicidal and homicidal thoughts.  He reports sleeping well and eating adequately.         Medications:       LORazepam  1 mg Oral TID     OLANZapine zydis  20 mg Oral At Bedtime    Or     OLANZapine  10 mg Intramuscular At Bedtime     traZODone  200 mg Oral At Bedtime          Allergies:   No Known Allergies       Labs:   No results found for this or any previous visit (from the past 24 hour(s)).       Psychiatric Examination:     /53   Pulse 86   Temp 96.9  F (36.1  C) (Oral)   Resp 18   Ht 1.702 m (5' 7\")   Wt 75.3 kg (166 lb)   SpO2 94%   BMI 26.00 kg/m    Weight is 166 lbs 0 oz  Body mass index is 26 kg/m .  Orthostatic Vitals     None        Appearance: casually dressed and less disheveled  Attitude:  guarded  Eye Contact:  fair  Mood:  better  Affect:  intensity is flat  Speech:  clear, coherent  Psychomotor Behavior:  no evidence of tardive dyskinesia, dystonia, or tics  Throught Process:  linear  Associations:  no loose associations  Thought Content:  no evidence of suicidal ideation or homicidal ideation, no evidence of psychotic thought and auditory hallucinations present  Insight:  limited  Judgement:  limited  Oriented to:  Person and place  Attention Span and Concentration:  fair  Recent and Remote Memory:  fair    Clinical Global " Impressions  First:  Considering your total clinical experience with this particular patient population, how severe are the patient's symptoms at this time?: 7 (11/03/19 0907)  Compared to the patient's condition at the START of treatment, this patient's condition is:: 4 (11/03/19 0907)  Most recent:  Considering your total clinical experience with this particular patient population, how severe are the patient's symptoms at this time?: 7 (11/21/19 1524)  Compared to the patient's condition at the START of treatment, this patient's condition is:: 4 (11/21/19 1524)         Precautions:     Behavioral Orders   Procedures     Code 1 - Restrict to Unit     Routine Programming     As clinically indicated     Sexual precautions     Status 15     Every 15 minutes.          Diagnoses:     Schizophrenia, decompensated         Plan:     Continue current med regimen without changes. Increased Zyprexa to 20 mg at bedtime on 12/2 to better address psychotic symptoms.  Redman order is in place.      Currently on a trial of Ativan to determine if catatonia is complicating his presentation.    Psychosocial treatments to be addressed with social work consult and groups.    Legal Status:  Patient is Committed with Redman in place.    Disposition: Pursuing intensive residential treatment services however the patient will require active medical assistance to gain admission to a treatment facility.  Medical assistance application has been completed and submitted to [place] and awaiting their decision.  Once approved, applications will be submitted to [facility] facilities.

## 2019-12-12 NOTE — PROVIDER NOTIFICATION
Pt requested Maalox during night shift. Pt told staff he has been having really bad acid reflux for a week and nothing is helping.

## 2019-12-13 PROCEDURE — G0177 OPPS/PHP; TRAIN & EDUC SERV: HCPCS

## 2019-12-13 PROCEDURE — 25000132 ZZH RX MED GY IP 250 OP 250 PS 637: Mod: GY | Performed by: PSYCHIATRY & NEUROLOGY

## 2019-12-13 PROCEDURE — 12400001 ZZH R&B MH UMMC

## 2019-12-13 PROCEDURE — 90853 GROUP PSYCHOTHERAPY: CPT

## 2019-12-13 RX ADMIN — LORAZEPAM 1 MG: 1 TABLET ORAL at 14:17

## 2019-12-13 RX ADMIN — OLANZAPINE 20 MG: 10 TABLET, ORALLY DISINTEGRATING ORAL at 20:24

## 2019-12-13 RX ADMIN — LORAZEPAM 1 MG: 1 TABLET ORAL at 20:25

## 2019-12-13 RX ADMIN — TRAZODONE HYDROCHLORIDE 200 MG: 100 TABLET ORAL at 20:24

## 2019-12-13 RX ADMIN — RAMELTEON 8 MG: 8 TABLET, FILM COATED ORAL at 20:24

## 2019-12-13 RX ADMIN — LORAZEPAM 1 MG: 1 TABLET ORAL at 08:45

## 2019-12-13 ASSESSMENT — ACTIVITIES OF DAILY LIVING (ADL)
ORAL_HYGIENE: INDEPENDENT
DRESS: STREET CLOTHES;INDEPENDENT
HYGIENE/GROOMING: INDEPENDENT

## 2019-12-13 NOTE — PLAN OF CARE
Problem: OT General Care Plan  Goal: OT Goal 1    Pt attended 3 out of 3 OT groups offered. Pt attended occupational therapy clinic. He declined initiating a task today, though remained attentive to peers' projects, and spent a majority of this group socializing appropriately. Pt actively participated in a structured occupational therapy group with a focus on visuospatial problem solving and social engagement via a group game. Pt demonstrated understanding of the novel 3-step task after an initial explanation. Pt remained focused and engaged throughout full duration of group. Strategic, though somewhat impulsive in his task approach. Pleasant, cooperative, and engaged throughout groups today. Affect appeared to brighten in interactions.

## 2019-12-13 NOTE — PROGRESS NOTES
INITIAL OT ASSESSMENT       12/03/19 1107   General Information   Date Initially Attended OT 12/02/19   Clinical Impression   Affect Appropriate to situation   Orientation Oriented to person, place and time   Appearance and ADLs Disheveled   Attention to Internal Stimuli Appears distracted/preoccupied internally;Able to refocus independently   Interaction Skills Interacts appropriately with staff;Interacts appropriately with peers   Ability to Communicate Needs Independent   Verbal Content Articulate;Clear;Appropriate to topic   Ability to Maintain Boundaries Maintains appropriate physical boundaries;Maintains appropriate verbal boundaries   Participation Independently participates   Concentration Concentrates 30+ minutes   Ability to Concentrate With structure   Follows and Comprehends Directions Independently follows multi-step directions   Memory Delayed and immediate recall intact   Organization Needs further assessment   Decision Making Independent   Planning and Problem Solving Occasionally needs assist/feedback;Impulsive   Ability to Apply and Learn Concepts Applies within group structure   Frustrations / Stress Tolerance Independently identifies skills    Level of Insight Some insight   Self Esteem Can identify positives;Takes risks with support and encouragement;Accepts positive feedback   Social Supports Has knowledge of support systems

## 2019-12-13 NOTE — PROGRESS NOTES
Faxed Commitment and Redman paperwork to Joey [facility] per request. Fax: [phone]

## 2019-12-13 NOTE — PROGRESS NOTES
"Patient has spent majority of the shift in [facility]. Watches sports. Makes small talk with peers and staff. Ate meals and snacks. Patient denies suicidal ideation and self injurious thoughts. Patient denies anxiety and depression. Denies auditory and visual hallucinations. Med compliant. Adl's continue to be poor.       Patient evaluation continues. Assessed mood,anxiety,thoughts and behavior.     Patient gradually progressing towards goals.    Patient is encouraged to participate in groups and assisted to develop healthy coping skills.     VS reviewed: /53   Pulse 86   Temp 96.9  F (36.1  C) (Oral)   Resp 18   Ht 1.702 m (5' 7\")   Wt 75.3 kg (166 lb)   SpO2 94%   BMI 26.00 kg/m      Length of stay: 39    Refer to daily team meeting notes for individualized plan of care. Nursing will continue to assess.      "

## 2019-12-13 NOTE — PROGRESS NOTES
Patient visible and active in the milieu most of the shift.  Overall, presented as bright, calm, alert, focused, pleasant and generally cooperative (except for his AM vital signs).  Peer interactions generally positive, respectful, and appropriate.  Has required no boundary-related redirection today.  Mood seen as generally calm and stable.  Affect has been full-range and congruent with mood.  Verbalized thinking organized, linear, and free of delusional references.  Has not appeared to be responding to internal stimuli at all today.  No behavioral management challenges noted. Open and responsive to writer's MH status inquiries.  Patient continues to deny all significant MH issues, concerns, and acuities.  Remains focused on status of his aftercare planning process.   Davian Florence   12/13/2019 12/13/19 1113   Sleep/Rest/Relaxation   Day/Evening Time Hours up all shift   Cognitive   Level Of Consciousness alert   Arousal Level arouses to voice   Orientation oriented x 4   Follows Commands yes   Speech clear;spontaneous;logical   Best Language 0 - No aphasia   Mood/Behavior calm;cooperative;behavior appropriate to situation   Behavioral Health   Hallucinations denies / not responding to hallucinations   Thinking intact   Orientation person: oriented;place: oriented;date: oriented;time: oriented   Insight denial of illness;poor   Judgement impaired   Affect full range affect   Mood mood is calm   Physical Appearance/Attire disheveled   Hygiene other (see comment)  (adequate)   Suicidality other (see comments)  (denied SI)   1. Wish to be Dead (Recent) No   2. Non-Specific Active Suicidal Thoughts (Recent) No   Self Injury other (see comment)  (denied SIB urges)   Elopement Statements about wanting to leave   Activity other (see comment)  (visible, socially active, attending groups)   Speech clear;coherent   Psychomotor / Gait balanced;steady   Psycho Education   Type of Intervention 1:1 intervention   Response  participates with encouragement   Hours 0.5   Safety   Suicidality Status 15   Activities of Daily Living   Hygiene/Grooming independent   Oral Hygiene independent   Dress street clothes;independent   Room Organization independent   Groups   Details OT Clinic

## 2019-12-13 NOTE — PROGRESS NOTES
"Participated in Music Therapy group with focus on mood elevation, validation and decreasing anxiety and improved group cohesiveness. Engaged and cooperative in music listening interventions.   Showed progress in session goals.     Hermann came to group today, and chose the song \"Another brick in the wall\" by Kristi Marcum, specifically because he stated he related to the song \"We don't need no education..\" as he said, \"I dropped out of school in the 9th grade\".  He expressed how he is very good at complex math problems and has impressed people with his skill in the past.      Some odd behaviors (staring at writer and smiling, picking his teeth in group), otherwise much more participatory than previously.    "

## 2019-12-13 NOTE — PROGRESS NOTES
"[facility], [place]   Psychiatric Progress Note    Length of stay (days): 39        Interim History:   The patient's care was discussed with the treatment team during the daily team meeting and/or staff's chart notes were reviewed.  Staff report: No acute issues.  He is attending some groups, converses spontaneously, takes his medications, eats his meals, and is sleeping well.  No hostile or self-injurious behaviors reported.    He mostly focused on discussing plans for discharge.  He reports that he is doing well and denies any side effects to his medications.  He denied psychotic symptoms.  He denied depressed mood.  He denied suicidal and homicidal thoughts.  He complained of some difficulty sleeping at night despite use of trazodone at adequate dosing.         Medications:       LORazepam  1 mg Oral TID     OLANZapine zydis  20 mg Oral At Bedtime    Or     OLANZapine  10 mg Intramuscular At Bedtime     ramelteon  8 mg Oral At Bedtime     traZODone  200 mg Oral At Bedtime          Allergies:   No Known Allergies       Labs:   No results found for this or any previous visit (from the past 24 hour(s)).       Psychiatric Examination:     /53   Pulse 86   Temp 96.9  F (36.1  C) (Oral)   Resp 18   Ht 1.702 m (5' 7\")   Wt 75.3 kg (166 lb)   SpO2 94%   BMI 26.00 kg/m    Weight is 166 lbs 0 oz  Body mass index is 26 kg/m .  Orthostatic Vitals     None        Appearance: casually dressed and less disheveled  Attitude:  guarded  Eye Contact:  fair  Mood:  better  Affect:  intensity is flat  Speech:  clear, coherent  Psychomotor Behavior:  no evidence of tardive dyskinesia, dystonia, or tics  Throught Process:  linear  Associations:  no loose associations  Thought Content:  no evidence of suicidal ideation or homicidal ideation, no evidence of psychotic thought and auditory hallucinations present  Insight:  limited  Judgement:  limited  Oriented to:  Person and place  Attention Span " and Concentration:  fair  Recent and Remote Memory:  fair    Clinical Global Impressions  First:  Considering your total clinical experience with this particular patient population, how severe are the patient's symptoms at this time?: 7 (11/03/19 0907)  Compared to the patient's condition at the START of treatment, this patient's condition is:: 4 (11/03/19 0907)  Most recent:  Considering your total clinical experience with this particular patient population, how severe are the patient's symptoms at this time?: 7 (11/21/19 1524)  Compared to the patient's condition at the START of treatment, this patient's condition is:: 4 (11/21/19 1524)         Precautions:     Behavioral Orders   Procedures     Code 1 - Restrict to Unit     Routine Programming     As clinically indicated     Sexual precautions     Status 15     Every 15 minutes.          Diagnoses:     Schizophrenia, decompensated         Plan:     Continue current med regimen without changes. Increased Zyprexa to 20 mg at bedtime on 12/2 to better address psychotic symptoms.  Redman order is in place.     Add Rozerem for insomnia.    Currently on a trial of Ativan to determine if catatonia is complicating his presentation.    Psychosocial treatments to be addressed with social work consult and groups.    Legal Status:  Patient is Committed with Redman in place.    Disposition: Pursuing intensive residential treatment services however the patient will require active medical assistance to gain admission to a treatment facility.  Medical assistance application has been completed and submitted to his County and awaiting their decision.  Once approved, applications will be submitted to [facility] facilities.

## 2019-12-14 PROCEDURE — 25000132 ZZH RX MED GY IP 250 OP 250 PS 637: Performed by: PSYCHIATRY & NEUROLOGY

## 2019-12-14 PROCEDURE — H2032 ACTIVITY THERAPY, PER 15 MIN: HCPCS

## 2019-12-14 PROCEDURE — 12400001 ZZH R&B MH UMMC

## 2019-12-14 PROCEDURE — 25000132 ZZH RX MED GY IP 250 OP 250 PS 637: Mod: GY | Performed by: PSYCHIATRY & NEUROLOGY

## 2019-12-14 RX ADMIN — LORAZEPAM 1 MG: 1 TABLET ORAL at 13:50

## 2019-12-14 RX ADMIN — ALUMINUM HYDROXIDE, MAGNESIUM HYDROXIDE, AND DIMETHICONE 30 ML: 400; 400; 40 SUSPENSION ORAL at 02:35

## 2019-12-14 RX ADMIN — HYDROXYZINE HYDROCHLORIDE 25 MG: 25 TABLET, FILM COATED ORAL at 02:35

## 2019-12-14 RX ADMIN — TRAZODONE HYDROCHLORIDE 200 MG: 100 TABLET ORAL at 21:18

## 2019-12-14 RX ADMIN — OLANZAPINE 20 MG: 10 TABLET, ORALLY DISINTEGRATING ORAL at 21:18

## 2019-12-14 RX ADMIN — NICOTINE POLACRILEX 8 MG: 4 GUM, CHEWING ORAL at 18:44

## 2019-12-14 RX ADMIN — LORAZEPAM 1 MG: 1 TABLET ORAL at 21:19

## 2019-12-14 RX ADMIN — LORAZEPAM 1 MG: 1 TABLET ORAL at 08:20

## 2019-12-14 RX ADMIN — RAMELTEON 8 MG: 8 TABLET, FILM COATED ORAL at 21:18

## 2019-12-14 ASSESSMENT — ACTIVITIES OF DAILY LIVING (ADL)
DRESS: INDEPENDENT
LAUNDRY: WITH SUPERVISION
ORAL_HYGIENE: INDEPENDENT
HYGIENE/GROOMING: INDEPENDENT
LAUNDRY: WITH SUPERVISION
DRESS: STREET CLOTHES;INDEPENDENT
ORAL_HYGIENE: INDEPENDENT
HYGIENE/GROOMING: HANDWASHING;INDEPENDENT

## 2019-12-14 NOTE — PROGRESS NOTES
Hermann refused his vital signs this afternoon. RN notified.       12/14/19 1291   Vital Signs   Temp   (Refused)   Pulse   (Refused)   BP   (Refused)

## 2019-12-14 NOTE — PROGRESS NOTES
12/14/19 0844   Vital Signs   Temp   (refused)   Temp src   (refused)   Resp   (refused)   BP   (refused)

## 2019-12-14 NOTE — PROGRESS NOTES
"patient spent most of the shift either in his room or in the lounge, watching television. He declined to check in, denying all issues.    Early in the shift, he made two inappropriate comments toward our unit's Humberto regarding the patient's derriere. Humberto informed him during each occurrence he did not appreciate the comments and the patient needed to behave in a more appropriate way. The patient asked this  if I perceived him as \"innappropriate all morning,\" mentioning one comment about his own posterior, which this  simply redirected into a general, \"be appropriate\" conversation.       12/14/19 4432   Behavioral Health   Hallucinations denies / not responding to hallucinations   Thinking intact   Orientation person: oriented;place: oriented;date: oriented;time: oriented   Memory baseline memory   Insight poor   Judgement impaired   Eye Contact at examiner   Affect blunted, flat   Mood mood is calm   Physical Appearance/Attire appears stated age;attire appropriate to age and situation   Hygiene well groomed   1. Wish to be Dead (Recent) No   2. Non-Specific Active Suicidal Thoughts (Recent) No   Activity withdrawn   Speech clear;coherent   Medication Sensitivity no stated side effects;no observed side effects   Psychomotor / Gait balanced;steady   Psycho Education   Type of Intervention 1:1 intervention   Response participates, initiates socially appropriate   Hours 0.5   Activities of Daily Living   Hygiene/Grooming independent   Oral Hygiene independent   Dress independent   Laundry with supervision   Room Organization independent     "

## 2019-12-14 NOTE — PROGRESS NOTES
"   12/13/19 1700   Group Therapy Session   Group Attendance attended group session   Total Time (minutes) 45   Group Type psychotherapeutic   Group Topic Covered other (see comments)   Patient Participation/Contribution cooperative with task;discussed personal experience with topic   Psychotherapy group goals: Identify and share responses to 4 questions (fears, loves/likes, things to let go, wants).  Hermann reported feeling good. He is looking forward to discharging next week. He presented as tired and clothes/hair disheveled. When asked what he is looking forward to this evening, he stated, \"waking up tomorrow.\"  He participated in the activity and processed his responses with the group. He reported one of his fears being, \"war.\" A couple of times he perseverated on war but he responded to redirection.   "

## 2019-12-14 NOTE — PLAN OF CARE
"  Hermann appeared to have a good evening.. Hermann came up to the med window for his meds, and did appear to be making cognitive reality based contact with staff when questioned, when responding. He also appeared to swallow his med thoroughly.He then went to his room and laid on his bed. Following protocol per orders written re\" giving Hermann meds, he was asked to stay outside his room for 10-15 minutes. It may be possible to re-evaluate this protocol.    Otherwise, Hermann was social with peers, out watching tv, and making conversation. His clothing could use some washing, but otherwise, he appears to be making good progress. His pattern from other weekends of appearing to be responging to internal stimuli did not appear to be happeneing.     Female staff did note one event of inappropriate statements, when he stated to the female; \" You're pretty cute, maybe we should get .\" He later made a couple follow-up statements about dating the female staff, but stopped the comments when she pointed out they werent appropriate within this context.   "

## 2019-12-15 PROCEDURE — 25000132 ZZH RX MED GY IP 250 OP 250 PS 637: Performed by: PSYCHIATRY & NEUROLOGY

## 2019-12-15 PROCEDURE — H2032 ACTIVITY THERAPY, PER 15 MIN: HCPCS

## 2019-12-15 PROCEDURE — 12400001 ZZH R&B MH UMMC

## 2019-12-15 RX ADMIN — TRAZODONE HYDROCHLORIDE 200 MG: 100 TABLET ORAL at 20:27

## 2019-12-15 RX ADMIN — RAMELTEON 8 MG: 8 TABLET, FILM COATED ORAL at 20:27

## 2019-12-15 RX ADMIN — LORAZEPAM 1 MG: 1 TABLET ORAL at 20:28

## 2019-12-15 RX ADMIN — LORAZEPAM 1 MG: 1 TABLET ORAL at 14:11

## 2019-12-15 RX ADMIN — LORAZEPAM 1 MG: 1 TABLET ORAL at 08:23

## 2019-12-15 RX ADMIN — OLANZAPINE 20 MG: 10 TABLET, ORALLY DISINTEGRATING ORAL at 20:27

## 2019-12-15 ASSESSMENT — ACTIVITIES OF DAILY LIVING (ADL)
LAUNDRY: WITH SUPERVISION
DRESS: INDEPENDENT
HYGIENE/GROOMING: INDEPENDENT
ORAL_HYGIENE: INDEPENDENT

## 2019-12-15 NOTE — PROGRESS NOTES
12/14/19 9931   General Information   Art Directive other (see comments)   AT directive was to create two images using lines, shapes and colors. The first image being a current issue they are working on (depression, anxiety, anger etc) and the second image being a possible solution to issue (calm, happiness etc) Goals of directive: emotional expression, identifying personal strengths and goals, creating a personal self narrative.  Pt was a positive participant, focused on task for the full duration of group. Pt pilar an image of a school and shared that attending college is a future goal of his. Pt is interested in studying mechanics/engineering. Pt was distractible at times/disorganized and author had to explain directive to him multiple times.  Pts mood was calm.

## 2019-12-15 NOTE — PROGRESS NOTES
Hermann spent the majority of the evening in the common areas pacing the hallway and watching television.  He attended and actively participated in Community Meeting. During 1:1 conversation his responses were spontaneous, linear, and displays improved insight. His overall tracking appears to be steadily improving.  He continues to appear distractible at times and reports ongoing issues with short term memory and recall.    This evening he presents himself in street clothes, overall hygiene appears acceptable.  He currently denies feeling depressed and anxious although he does appear restless at times.  He states he's hoping for IRTS placement sometime this coming week.  He currently denies SI and SIB urges/thoughts.        12/14/19 1930   Sleep/Rest/Relaxation   Day/Evening Time Hours up all shift   Behavioral Health   Hallucinations denies / not responding to hallucinations   Thinking poor concentration   Orientation person: oriented;place: oriented   Memory baseline memory   Insight poor   Judgement impaired   Eye Contact at examiner   Affect tense;full range affect   Mood anxious;mood is calm   Physical Appearance/Attire appears stated age;attire appropriate to age and situation   Hygiene well groomed   Suicidality other (see comments)  (Currently denies SI.)   1. Wish to be Dead (Recent) No   2. Non-Specific Active Suicidal Thoughts (Recent) No   Self Injury other (see comment)  (Currently denies SIB urges/thoughts. )   Elopement   (No concernable statements or behaviors observed. )   Activity restless   Speech clear;coherent   Medication Sensitivity no stated side effects;no observed side effects   Psychomotor / Gait paces;balanced;steady   Overt Aggression Scale   Verbal Aggression 0   Aggression against Property 0   Auto-Aggression 0   Physical Aggression 0   Overt Aggression Total Score 0   Coping/Psychosocial Interventions   Supportive Measures active listening utilized;counseling provided;relaxation  techniques promoted;verbalization of feelings encouraged   Psycho Education   Type of Intervention 1:1 intervention   Response participates, initiates socially appropriate   Hours 0.5   Treatment Detail Current MH Status.    Activities of Daily Living   Hygiene/Grooming handwashing;independent   Oral Hygiene independent   Dress street clothes;independent   Laundry with supervision   Room Organization independent   Activity   Activity Assistance Provided independent   Hygiene Care Assistance   Hygiene Assistance independent   Groups   Details Community Meeting  (Attended with active participation. )

## 2019-12-15 NOTE — PROGRESS NOTES
patient spent much of the shift visible in the lounge, moderately social with peers. He denies all issues.       12/15/19 1427   Behavioral Health   Hallucinations denies / not responding to hallucinations   Thinking intact   Orientation person: oriented;place: oriented;date: oriented;time: oriented   Memory baseline memory   Insight insight appropriate to situation;insight appropriate to events   Judgement impaired   Eye Contact at examiner   Affect full range affect   Mood mood is calm   Physical Appearance/Attire appears stated age;attire appropriate to age and situation   Hygiene well groomed   1. Wish to be Dead (Recent) No   2. Non-Specific Active Suicidal Thoughts (Recent) No   Activity other (see comment)  (Visible with peers)   Speech clear;coherent   Medication Sensitivity no stated side effects;no observed side effects   Psychomotor / Gait balanced;steady   Psycho Education   Type of Intervention 1:1 intervention   Response participates, initiates socially appropriate   Hours 0.5   Activities of Daily Living   Hygiene/Grooming independent   Oral Hygiene independent   Dress independent   Laundry with supervision   Room Organization independent

## 2019-12-15 NOTE — PROGRESS NOTES
Hermann refused his vital signs this afternoon. RN notified.        12/15/19 2409   Vital Signs   Temp   (Refused)   Pulse   (Refused)   BP   (Refused)

## 2019-12-15 NOTE — PROGRESS NOTES
"This writer went into the med room and noted Hermann to be rocking back and forth in the front of the medication window, upon closer observations Hermann was found to be masturbating. Patient was asked if he thought he should go to his room and his response was \"No. I'm good.\" Hermann was then redirected away from the medication window so other patients could approach and get there medications.  "

## 2019-12-16 PROCEDURE — H2032 ACTIVITY THERAPY, PER 15 MIN: HCPCS

## 2019-12-16 PROCEDURE — 25000132 ZZH RX MED GY IP 250 OP 250 PS 637: Performed by: PSYCHIATRY & NEUROLOGY

## 2019-12-16 PROCEDURE — 99232 SBSQ HOSP IP/OBS MODERATE 35: CPT | Performed by: PSYCHIATRY & NEUROLOGY

## 2019-12-16 PROCEDURE — 25000132 ZZH RX MED GY IP 250 OP 250 PS 637: Mod: GY | Performed by: PSYCHIATRY & NEUROLOGY

## 2019-12-16 PROCEDURE — 12400001 ZZH R&B MH UMMC

## 2019-12-16 PROCEDURE — G0177 OPPS/PHP; TRAIN & EDUC SERV: HCPCS

## 2019-12-16 RX ADMIN — OLANZAPINE 20 MG: 10 TABLET, ORALLY DISINTEGRATING ORAL at 20:16

## 2019-12-16 RX ADMIN — LORAZEPAM 1 MG: 1 TABLET ORAL at 08:21

## 2019-12-16 RX ADMIN — TRAZODONE HYDROCHLORIDE 200 MG: 100 TABLET ORAL at 20:16

## 2019-12-16 RX ADMIN — LORAZEPAM 1 MG: 1 TABLET ORAL at 20:16

## 2019-12-16 RX ADMIN — METFORMIN HYDROCHLORIDE 500 MG: 500 TABLET ORAL at 18:29

## 2019-12-16 RX ADMIN — RAMELTEON 8 MG: 8 TABLET, FILM COATED ORAL at 20:16

## 2019-12-16 RX ADMIN — LORAZEPAM 1 MG: 1 TABLET ORAL at 13:50

## 2019-12-16 ASSESSMENT — ACTIVITIES OF DAILY LIVING (ADL)
ORAL_HYGIENE: INDEPENDENT
DRESS: INDEPENDENT
HYGIENE/GROOMING: INDEPENDENT

## 2019-12-16 NOTE — PROGRESS NOTES
SHILPI spoke with Kasia ([id number]) re: Hermann's MA application. Kasia reported that the last correspondence from the [facility] was 12/9/19 and as of now his MA is still pending. Kasia explained to WR that they generally take the applications in the order they are received so there will not be a way to expedite the process in order to get Hermann approved sooner.

## 2019-12-16 NOTE — PLAN OF CARE
BEHAVIORAL TEAM DISCUSSION    Participants: Marisel PYLE, Carmelina Ramos , Dr. Woods    Progress: Hermann is on a commitment and REDMAN order. Hermann was brought here via ambulance for disorganized behavior and SI. He is diagnosed with Schizophrenia and Bipolar. Hermann has been medication compliant as per his Redman order. He has been participating in some groups and has been experiencing a decrease in his psychotic symptoms along with evidence to suggest that group engagement is possible for Hermann in an IRTS, post discharge.     Anticipated length of stay: Pt needs to be discharged to a structured 24 hour care facility.     Continued Stay Criteria/Rationale: Hermann is not stable and will need an IRTS placement     Medical/Physical: None     Precautions:   Behavioral Orders   Procedures     Code 1 - Restrict to Unit     Routine Programming     As clinically indicated     Sexual precautions     Status 15     Every 15 minutes.     Plan:Continue medication management. CTC will continue to work on discharge planning.     Rationale for change in precautions or plan: No changes

## 2019-12-16 NOTE — PROGRESS NOTES
Anabela John is a 34year old female [id number] Patient's last menstrual period was 02/11/2019. Patient presents with:  Gyn Exam: Tampon stuck. Last seen in 1/2018. Seen MAHESH in 1/2019 for annual.      Here due to possible retained tampon.   LMP 2/11-2/ Pt appears to have slept 5.75 hours this shift.  Pt noted getting up for a snack at least 3 times/night in recent nights asking for large amount of food (zyprexa likely/possibly causing increased appetite).  Night staff have attempted to limit choices as pt requests an excess of items (will request 4 pieces toast for PB & J with 2 cereals and milks, plus more fluids at different times throughout night).  Pt cooperative, behaviorally controlled.  Will continue to monitor and support plan of care.     masses or tenderness  Vagina:  Normal appearance without lesions, no abnormal discharge. NO retained tampon.    Cervix:  Normal without tenderness on motion  Uterus: normal in size, contour, position, mobility, without tenderness  Adnexa: normal without ma

## 2019-12-16 NOTE — PROGRESS NOTES
SHILPI spoke with Kandy in admissions at [facility] [phone] is direct line. [facility] currently has a month long wait list, she is placing Hermann on the list and encouraged WR to check in periodically in case things have changed.

## 2019-12-16 NOTE — PLAN OF CARE
Hermann appeared to have a good evening tonight. He was social with peers, joking around, and watching tv with several people. He continues to talk about looking forward to housing/treatment in an IRTS, and definitely continues to connect better with reality.     There were no untoward incidents of masturbation observed this shift.

## 2019-12-16 NOTE — PROGRESS NOTES
SHILPI spoke with [facility] re: andre that was sent in on 11/25/19. The rep on the phone stated that the application had not yet been processed and Hermann should be approved by the end of the week (12/20/19). [facility] #[id number]. SHILPI will f/u on Friday to see if this application has been processed and Hermann is able to have retroactive insurance starting November 1 2019.

## 2019-12-16 NOTE — PROGRESS NOTES
SHILPI spoke with Mercedes at [phone]  re: Hermann's IRTS admission. Mercedes needed WR to provide some more information. SHILPI explained Hermann's commitment status and  Recommendations for Hermann to be admitted to an IRTS. Mercedes explained that [facility] has a 3 week wait currently and inquired as to Hermann's willingness to consider all 3 IRTS. SHILPI responded that she was unsure about Hermann's willingness to consider all 3 IRTS and SHILPI will check on this. Mercedes will keep SHILPI informed as to the status of the wait list in the meantime.

## 2019-12-16 NOTE — PROGRESS NOTES
12/15/19 2200   Art Therapy   Type of Intervention structured groups   Response participates with encouragement   Hours 1   Treatment Detail    Art Therapy- Intention setting/ goals-ornaments   Goal- cope, express, regulate and reflect through Art Therapy directives     Outcome- Pt was engaged and cooperative. He made an ornament for his daughter. He seemed pleased with it. Writer noticed the name was spelled wrong, but he didn't notice, her name is Mikaela and he didn't notice that the I in th ia was missing.

## 2019-12-16 NOTE — PROGRESS NOTES
Hermann requested to meet with a CTC. WR spoke with Hermann. He had questions about his discharge timeline. Hermann was rocking back and forth while WR spoke to him, he was able to understand what WR said and was able to ask f/u questions. WR informed Hermann that she had contacted his TCM and left a message for a call back, and had contacted [facility] IRTS about his referral. WR also just spoke to Kasia in financial department about his MA and it is still pending. Hermann asked why the MA was taking so long and WR was not able to answer. WR explained that Hermann needs to get approved for MA before he can get into an IRTS. Hermann expressed jolene the is having difficulty with patience since he wants to leave. WR assured Hermann that WR will be staying in contact once any changes occur and gave Hermann praise for being willing to remain patient.

## 2019-12-16 NOTE — PROGRESS NOTES
SHILPI called Anton beckman ([phone]) to f/u on IRTS referral. SHILPI was instructed to leave a message as the individual necessary to speak with was not available at the moment.

## 2019-12-16 NOTE — PROGRESS NOTES
SHILPI called and left a VM for Hermann's [facility]  Mildred [phone] in an effort to coordinate Hermann's pending MA application and discharge to an IRTS facility. SHILPI requested a call back.

## 2019-12-16 NOTE — PROGRESS NOTES
"[facility], [place]   Psychiatric Progress Note    Length of stay (days): 43        Interim History:   The patient's care was discussed with the treatment team during the daily team meeting and/or staff's chart notes were reviewed.  Staff report: No acute issues.  He is attending some groups, converses spontaneously, takes his medications, eats his meals, and is sleeping well.  No hostile or self-injurious behaviors reported. Makes some inappropriate statements to female peers at times, though is redirectable. Staff report excessive food intake.     Pt continues to minimize severity of MH sx prior to admission and during course of hospitalization. He said \"I don't even know why I am on a commitment when I was just walking around, being homeless.\" He denied experiencing AH or disorganized thought process. However, he is amenable with plan to continue current medication regimen. We reviewed indications for all scheduled medications.  Discussed R/B/A of metformin for neuroleptic induced wt gain. He agreed to a trial.   He mostly focused on discussing plans for discharge.  He reports that he is doing well and denies any side effects to his medications.  He denied psychotic symptoms.  He denied depressed mood.  He denied suicidal and homicidal thoughts.  He reported improved sleep.         Medications:       LORazepam  1 mg Oral TID     metFORMIN  500 mg Oral BID w/meals     OLANZapine zydis  20 mg Oral At Bedtime    Or     OLANZapine  10 mg Intramuscular At Bedtime     ramelteon  8 mg Oral At Bedtime     traZODone  200 mg Oral At Bedtime          Allergies:   No Known Allergies       Labs:   No results found for this or any previous visit (from the past 24 hour(s)).       Psychiatric Examination:     /53   Pulse 86   Temp 96.9  F (36.1  C) (Oral)   Resp 18   Ht 1.702 m (5' 7\")   Wt 75.3 kg (166 lb)   SpO2 94%   BMI 26.00 kg/m    Weight is 166 lbs 0 oz  Body mass index is 26 " "kg/m .  Orthostatic Vitals     None        Appearance: casually dressed and less disheveled, audibly grinding his teeth, noting that \"Sandi always done that.\"   Attitude:  guarded  Eye Contact:  fair  Mood:  better  Affect:  intensity is flat  Speech:  clear, coherent  Psychomotor Behavior:  no evidence of tardive dyskinesia, dystonia, or tics  Throught Process:  linear  Associations:  no loose associations  Thought Content:  no evidence of suicidal ideation or homicidal ideation, no evidence of psychotic thought and auditory hallucinations present  Insight:  limited  Judgement:  limited  Oriented to:  Person and place  Attention Span and Concentration:  fair  Recent and Remote Memory:  fair    Clinical Global Impressions  First:  Considering your total clinical experience with this particular patient population, how severe are the patient's symptoms at this time?: 7 (11/03/19 0907)  Compared to the patient's condition at the START of treatment, this patient's condition is:: 4 (11/03/19 0907)  Most recent:  Considering your total clinical experience with this particular patient population, how severe are the patient's symptoms at this time?: 7 (11/21/19 1524)  Compared to the patient's condition at the START of treatment, this patient's condition is:: 4 (11/21/19 1524)         Precautions:     Behavioral Orders   Procedures     Code 1 - Restrict to Unit     Routine Programming     As clinically indicated     Sexual precautions     Status 15     Every 15 minutes.          Diagnoses:     Schizophrenia, decompensated         Plan:     Continue current med regimen without changes. Increased Zyprexa to 20 mg at bedtime on 12/2 to better address psychotic symptoms.  Redman order is in place.     Add metformin 500 mg BID for neuroleptic induced wt gain    Currently on a trial of Ativan to determine if catatonia is complicating his presentation. Appears to be helping.     Psychosocial treatments to be addressed with social " work consult and groups.    Legal Status:  Patient is Committed with Redman in place.    Disposition: Pursuing intensive residential treatment services however the patient will require active medical assistance to gain admission to a treatment facility.  Medical assistance application has been completed and submitted to his County and awaiting their decision.  Once approved, applications will be submitted to IRTS facilities.    Ann Marie Woods MD  [facility]

## 2019-12-16 NOTE — PROGRESS NOTES
12/16/19 1500   Behavioral Health   Hallucinations denies / not responding to hallucinations   Thinking intact   Orientation person: oriented;place: oriented;date: oriented;time: oriented   Eye Contact at examiner   Affect full range affect   Mood mood is calm  (Far fewer inapropriate acts today. Only 1 gas passing sound.)   Physical Appearance/Attire disheveled   Hygiene neglected grooming - unclean body, hair, teeth   Suicidality   (Denies.)   1. Wish to be Dead (Recent) No   2. Non-Specific Active Suicidal Thoughts (Recent) No   Activity other (see comment)  (Pacing.)   Speech clear;coherent   Medication Sensitivity no stated side effects;no observed side effects   Psychomotor / Gait balanced;steady

## 2019-12-17 PROCEDURE — 25000132 ZZH RX MED GY IP 250 OP 250 PS 637: Performed by: PSYCHIATRY & NEUROLOGY

## 2019-12-17 PROCEDURE — 99231 SBSQ HOSP IP/OBS SF/LOW 25: CPT | Performed by: PSYCHIATRY & NEUROLOGY

## 2019-12-17 PROCEDURE — G0177 OPPS/PHP; TRAIN & EDUC SERV: HCPCS

## 2019-12-17 PROCEDURE — 25000132 ZZH RX MED GY IP 250 OP 250 PS 637: Mod: GY | Performed by: PSYCHIATRY & NEUROLOGY

## 2019-12-17 PROCEDURE — 12400001 ZZH R&B MH UMMC

## 2019-12-17 RX ADMIN — METFORMIN HYDROCHLORIDE 500 MG: 500 TABLET ORAL at 17:11

## 2019-12-17 RX ADMIN — LORAZEPAM 1 MG: 1 TABLET ORAL at 14:38

## 2019-12-17 RX ADMIN — OLANZAPINE 20 MG: 10 TABLET, ORALLY DISINTEGRATING ORAL at 20:03

## 2019-12-17 RX ADMIN — METFORMIN HYDROCHLORIDE 500 MG: 500 TABLET ORAL at 09:21

## 2019-12-17 RX ADMIN — RAMELTEON 8 MG: 8 TABLET, FILM COATED ORAL at 20:03

## 2019-12-17 RX ADMIN — LORAZEPAM 1 MG: 1 TABLET ORAL at 09:21

## 2019-12-17 RX ADMIN — TRAZODONE HYDROCHLORIDE 200 MG: 100 TABLET ORAL at 20:03

## 2019-12-17 RX ADMIN — LORAZEPAM 1 MG: 1 TABLET ORAL at 20:03

## 2019-12-17 ASSESSMENT — ACTIVITIES OF DAILY LIVING (ADL)
ORAL_HYGIENE: INDEPENDENT
DRESS: INDEPENDENT
ORAL_HYGIENE: INDEPENDENT
HYGIENE/GROOMING: INDEPENDENT
DRESS: STREET CLOTHES;INDEPENDENT
LAUNDRY: WITH SUPERVISION
HYGIENE/GROOMING: INDEPENDENT

## 2019-12-17 NOTE — PROGRESS NOTES
WR faxed IRTS referrals to:     [facility] [phone]                                                                                                     [facility] [phone]  [place] [phone]  [facility] [phone]    2:30 PM WR spoke with Omar Dolan [phone] and scheduled an appointment on 12/19/19 at 1:30 to come and interview Hermann for admission to [facility].

## 2019-12-17 NOTE — PROGRESS NOTES
12/16/19 2200   Therapeutic Recreation   Type of Intervention structured groups   Activity game   Response Participates with cues/redirection   Hours 1     Pt attended a structured Therapeutic Recreation group with a focus on leisure participation, stress reduction, and social engagement via a group game. Pt participated throughout full duration of group.  Showed progress in session goals. Pt mood was calm and was appropriate with interactions. Pt picked 3 cards from the game that represented positive things about self and shared with the group. Pt often needed cues to retain his focus back to the group activity. Pt would occasionally stare blankly. Pt was far less restless during this group, compared to prior TR groups.

## 2019-12-17 NOTE — PROGRESS NOTES
"[facility], [place]   Psychiatric Progress Note    Length of stay (days): 44        Interim History:   The patient's care was discussed with the treatment team during the daily team meeting and/or staff's chart notes were reviewed.  Staff report: No acute issues.  He is attending some groups, converses spontaneously, takes his medications, eats his meals, and is sleeping well.  No hostile or self-injurious behaviors reported.  He has remained calm and cooperative. Staff report excessive food intake.     He mostly focused on discussing plans for discharge.  He reports that he is doing well and denies any side effects to his medications.  He denied psychotic symptoms.  He denied depressed mood.  He denied suicidal and homicidal thoughts.  He reported improved sleep.         Medications:       LORazepam  1 mg Oral TID     metFORMIN  500 mg Oral BID w/meals     OLANZapine zydis  20 mg Oral At Bedtime    Or     OLANZapine  10 mg Intramuscular At Bedtime     ramelteon  8 mg Oral At Bedtime     traZODone  200 mg Oral At Bedtime          Allergies:   No Known Allergies       Labs:   No results found for this or any previous visit (from the past 24 hour(s)).       Psychiatric Examination:     /53   Pulse 86   Temp 96.9  F (36.1  C) (Oral)   Resp 18   Ht 1.702 m (5' 7\")   Wt 75.3 kg (166 lb)   SpO2 94%   BMI 26.00 kg/m    Weight is 166 lbs 0 oz  Body mass index is 26 kg/m .  Orthostatic Vitals     None        Appearance: casually dressed and less disheveled, audibly grinding his teeth, noting that \"Sandi always done that.\"   Attitude:  guarded  Eye Contact:  fair  Mood:  better  Affect:  intensity is flat  Speech:  clear, coherent  Psychomotor Behavior:  no evidence of tardive dyskinesia, dystonia, or tics  Throught Process:  linear  Associations:  no loose associations  Thought Content:  no evidence of suicidal ideation or homicidal ideation, no evidence of psychotic thought and " auditory hallucinations present  Insight:  limited  Judgement:  limited  Oriented to:  Person and place  Attention Span and Concentration:  fair  Recent and Remote Memory:  fair    Clinical Global Impressions  First:  Considering your total clinical experience with this particular patient population, how severe are the patient's symptoms at this time?: 7 (11/03/19 0907)  Compared to the patient's condition at the START of treatment, this patient's condition is:: 4 (11/03/19 0907)  Most recent:  Considering your total clinical experience with this particular patient population, how severe are the patient's symptoms at this time?: 7 (11/21/19 1524)  Compared to the patient's condition at the START of treatment, this patient's condition is:: 4 (11/21/19 1524)         Precautions:     Behavioral Orders   Procedures     Code 1 - Restrict to Unit     Routine Programming     As clinically indicated     Sexual precautions     Status 15     Every 15 minutes.          Diagnoses:     Schizophrenia, decompensated         Plan:     Continue current med regimen without changes. Increased Zyprexa to 20 mg at bedtime on 12/2 to better address psychotic symptoms.  [facility]man order is in place.     Added metformin 500 mg BID for neuroleptic induced wt gain    Currently on a trial of Ativan to determine if catatonia is complicating his presentation. Appears to be helping.     Psychosocial treatments to be addressed with social work consult and groups.    Legal Status:  Patient is Committed with [facility] in place.    Disposition: Pursuing intensive residential treatment services however the patient will require active medical assistance to gain admission to a treatment facility.  Medical assistance application has been completed and submitted to his County and awaiting their decision.  Once approved, applications will be submitted to [facility] facilities.    Ann Marie Woods MD  [facility] Psychiatry

## 2019-12-17 NOTE — PROGRESS NOTES
Hermann refused his vital signs this afternoon. RN notified.       12/17/19 6712   Vital Signs   Temp   (Refused)   Pulse   (Refused)   BP   (Refused)

## 2019-12-17 NOTE — PROGRESS NOTES
Pt was in a good mood this evening. He spent his time watching tv, attending groups, and resting in bed. He was calm, though he didn't interact much with peers or staff. He seemed to have intact thinking and appropriate insight. He attended and participated in both community meeting and recreational therapy group. He denied any SI, SIB, or hallucinations. He paced the hallway for a short amount of time, as well as rocked side to side on his feet at times. He plans on going to an IRTS facility soon.     12/16/19 2200   Behavioral Health   Hallucinations denies / not responding to hallucinations   Thinking intact   Orientation person: oriented;place: oriented;date: oriented;time: oriented   Memory baseline memory   Insight insight appropriate to situation;insight appropriate to events   Judgement intact   Eye Contact at examiner   Affect full range affect   Mood mood is calm   Physical Appearance/Attire appears stated age;attire appropriate to age and situation   Hygiene neglected grooming - unclean body, hair, teeth   Suicidality other (see comments)  (denies)   1. Wish to be Dead (Recent) No   2. Non-Specific Active Suicidal Thoughts (Recent) No   Self Injury other (see comment)  (denies)   Elopement   (none)   Activity other (see comment)  (visible in milieu)   Speech clear;coherent   Medication Sensitivity no stated side effects;no observed side effects   Psychomotor / Gait steady;balanced   Activities of Daily Living   Hygiene/Grooming independent   Oral Hygiene independent   Dress independent   Room Organization independent

## 2019-12-17 NOTE — PLAN OF CARE
Pt has been visible on the unit. Pt social at times with peers on the unit. Pt was med compliant and independent of his ADLs. Pt denies all mental health symptoms. Pt did not appear to be responding to any internal stimuli. Denies SI/SIB as well as HI.

## 2019-12-18 PROCEDURE — 25000132 ZZH RX MED GY IP 250 OP 250 PS 637: Performed by: PSYCHIATRY & NEUROLOGY

## 2019-12-18 PROCEDURE — 12400001 ZZH R&B MH UMMC

## 2019-12-18 PROCEDURE — 90853 GROUP PSYCHOTHERAPY: CPT

## 2019-12-18 PROCEDURE — 25000132 ZZH RX MED GY IP 250 OP 250 PS 637: Mod: GY | Performed by: PSYCHIATRY & NEUROLOGY

## 2019-12-18 PROCEDURE — 99231 SBSQ HOSP IP/OBS SF/LOW 25: CPT | Performed by: PSYCHIATRY & NEUROLOGY

## 2019-12-18 PROCEDURE — G0177 OPPS/PHP; TRAIN & EDUC SERV: HCPCS

## 2019-12-18 RX ADMIN — METFORMIN HYDROCHLORIDE 500 MG: 500 TABLET ORAL at 17:50

## 2019-12-18 RX ADMIN — LORAZEPAM 1 MG: 1 TABLET ORAL at 09:11

## 2019-12-18 RX ADMIN — OLANZAPINE 20 MG: 10 TABLET, ORALLY DISINTEGRATING ORAL at 20:12

## 2019-12-18 RX ADMIN — RAMELTEON 8 MG: 8 TABLET, FILM COATED ORAL at 20:12

## 2019-12-18 RX ADMIN — LORAZEPAM 1 MG: 1 TABLET ORAL at 13:43

## 2019-12-18 RX ADMIN — LORAZEPAM 1 MG: 1 TABLET ORAL at 20:12

## 2019-12-18 RX ADMIN — METFORMIN HYDROCHLORIDE 500 MG: 500 TABLET ORAL at 09:11

## 2019-12-18 RX ADMIN — ALUMINUM HYDROXIDE, MAGNESIUM HYDROXIDE, AND DIMETHICONE 30 ML: 400; 400; 40 SUSPENSION ORAL at 00:40

## 2019-12-18 RX ADMIN — TRAZODONE HYDROCHLORIDE 200 MG: 100 TABLET ORAL at 20:12

## 2019-12-18 ASSESSMENT — ACTIVITIES OF DAILY LIVING (ADL)
DRESS: STREET CLOTHES;SCRUBS (BEHAVIORAL HEALTH)
ORAL_HYGIENE: INDEPENDENT
HYGIENE/GROOMING: INDEPENDENT
LAUNDRY: WITH SUPERVISION
LAUNDRY: WITH SUPERVISION
ORAL_HYGIENE: TOTAL CARE;INDEPENDENT

## 2019-12-18 NOTE — PROGRESS NOTES
Hermann seemed to have a good day. Pt seemed to be in high spirits, smiling and joking with staff. Pt played some card games with his peers. Pt attended community meeting and an AA meeting this evening. Pt ate at mealtimes. Pt briefly checked in with staff and said he has an interview soon for an IRTS facility soon. Pt denies SI/SIB and all MH symptoms. Pt presents as somewhat unitdy, but is able to carry appropriate conversation and be social with peers. No concerns noted.      12/17/19 2130   Behavioral Health   Hallucinations denies / not responding to hallucinations   Thinking intact   Orientation date: oriented;place: oriented;person: oriented;time: oriented   Memory baseline memory   Insight insight appropriate to situation   Judgement intact   Eye Contact at examiner   Affect full range affect   Mood mood is calm   Physical Appearance/Attire attire appropriate to age and situation   Hygiene neglected grooming - unclean body, hair, teeth   Suicidality other (see comments)  (Pt denies SI)   1. Wish to be Dead (Recent) No   2. Non-Specific Active Suicidal Thoughts (Recent) No   Self Injury other (see comment)  (Pt denies SIB)   Elopement   (No observed behaviors/statements of concern)   Activity other (see comment)  (Pt visible and sociable in milieu)   Speech coherent;clear   Medication Sensitivity no stated side effects   Psychomotor / Gait balanced;steady   Psycho Education   Type of Intervention 1:1 intervention   Response participates, initiates socially appropriate   Hours 0.5   Treatment Detail Check in   Activities of Daily Living   Hygiene/Grooming independent   Oral Hygiene independent   Dress street clothes;independent   Laundry with supervision   Room Organization independent   Activity   Activity Assistance Provided independent

## 2019-12-18 NOTE — PROGRESS NOTES
Behavioral Health  Note   Behavioral Health  Spirituality Group Note     Unit 30    Name: Hermann Arita    YOB: 1985   MRN: [id number]    Age: 34 year old     Patient attended -led group, which included discussion of spirituality, coping with illness and building resilience.   Patient attended group for 1.0 hrs.   The patient actively participated in group discussion     [facility]  Staff    Page [phone]

## 2019-12-18 NOTE — PROGRESS NOTES
12/18/19 0825   Vital Signs   Temp   (refused)   Temp src   (refiused)   Pulse   (refused)   BP   (refused)

## 2019-12-18 NOTE — PROGRESS NOTES
Pt was calm, cooperative and pleasant upon approach with blunted, flat mood affect. He was visibile in milieu for the majority part of the shift pacing, joking with staff members, attending and participating in some of the groups. He denied all mental health symptoms including SI/SIB ideation. His appetite was good and sleeping/napping well. He was behaviorally and socially appropriate on the unit.         12/18/19 1300   Behavioral Health   Hallucinations denies / not responding to hallucinations   Thinking poor concentration   Orientation time: oriented;date: oriented;place: oriented   Memory baseline memory   Insight insight appropriate to situation   Judgement impaired   Eye Contact at examiner   Affect blunted, flat   Mood mood is calm   Physical Appearance/Attire attire appropriate to age and situation   Hygiene neglected grooming - unclean body, hair, teeth   Suicidality   (denied )   1. Wish to be Dead (Recent) No   Wish to be Dead Description (Recent) .   2. Non-Specific Active Suicidal Thoughts (Recent) No   Self Injury   (denied )   Elopement   (denied )   Activity withdrawn;isolative   Speech clear;coherent   Medication Sensitivity no stated side effects   Psychomotor / Gait balanced;steady   Psycho Education   Type of Intervention 1:1 intervention   Response participates, initiates socially appropriate   Hours 0.5   Activities of Daily Living   Hygiene/Grooming independent   Oral Hygiene independent   Dress street clothes;scrubs (behavioral health)   Laundry with supervision   Room Organization independent

## 2019-12-18 NOTE — PROGRESS NOTES
"[facility], [place]   Psychiatric Progress Note    Length of stay (days): 45        Interim History:   The patient's care was discussed with the treatment team during the daily team meeting and/or staff's chart notes were reviewed.  Staff report: No acute issues.  He is attending some groups, converses spontaneously, takes his medications, eats his meals, and is sleeping well.  No hostile or self-injurious behaviors reported.  He has remained calm and cooperative.     He mostly focused on discussing plans for discharge.  He reports that he is doing well and denies any side effects to his medications.  He denied psychotic symptoms.  He denied depressed mood.  He denied suicidal and homicidal thoughts.  He reported improved sleep.  Again minimizes events leading to hospitalization. He does not feel hospitalization or commitment were necessary interventions. He said \"It is what it is now though so I will go to an IRTS.\" He said that if he left unit for interview, there would be no issues. He said \"I wouldn't run or anything like that. It would just cause me more trouble, which is stupid.\" He agrees to remain at an IRTS facility until his outpatient team and IRTS staff agree he is appropriate for next level of care.          Medications:       LORazepam  1 mg Oral TID     metFORMIN  500 mg Oral BID w/meals     OLANZapine zydis  20 mg Oral At Bedtime    Or     OLANZapine  10 mg Intramuscular At Bedtime     ramelteon  8 mg Oral At Bedtime     traZODone  200 mg Oral At Bedtime          Allergies:   No Known Allergies       Labs:   No results found for this or any previous visit (from the past 24 hour(s)).       Psychiatric Examination:     /53   Pulse 86   Temp 96.9  F (36.1  C) (Oral)   Resp 18   Ht 1.702 m (5' 7\")   Wt 75.3 kg (166 lb)   SpO2 94%   BMI 26.00 kg/m    Weight is 166 lbs 0 oz  Body mass index is 26 kg/m .  Orthostatic Vitals     None        Appearance: casually " "dressed and less disheveled, audibly grinding his teeth, noting that \"Sandi always done that.\"   Attitude:  guarded  Eye Contact:  fair  Mood:  better  Affect:  intensity is flat  Speech:  clear, coherent  Psychomotor Behavior:  no evidence of tardive dyskinesia, dystonia, or tics  Throught Process:  linear  Associations:  no loose associations  Thought Content:  no evidence of suicidal ideation or homicidal ideation, no evidence of psychotic thought and auditory hallucinations present  Insight:  limited  Judgement:  limited  Oriented to:  Person and place  Attention Span and Concentration:  fair  Recent and Remote Memory:  fair    Clinical Global Impressions  First:  Considering your total clinical experience with this particular patient population, how severe are the patient's symptoms at this time?: 7 (11/03/19 0907)  Compared to the patient's condition at the START of treatment, this patient's condition is:: 4 (11/03/19 0907)  Most recent:  Considering your total clinical experience with this particular patient population, how severe are the patient's symptoms at this time?: 7 (11/21/19 1524)  Compared to the patient's condition at the START of treatment, this patient's condition is:: 4 (11/21/19 1524)         Precautions:     Behavioral Orders   Procedures     Code 1 - Restrict to Unit     Routine Programming     As clinically indicated     Sexual precautions     Status 15     Every 15 minutes.          Diagnoses:     Schizophrenia, decompensated         Plan:     Continue current med regimen without changes. Increased Zyprexa to 20 mg at bedtime on 12/2 to better address psychotic symptoms, which have since improved.  [facility] order is in place.     Added metformin 500 mg BID for neuroleptic induced wt gain.    Currently on a trial of Ativan to treat catatonic symptoms. Appears to be helping.     Psychosocial treatments to be addressed with social work consult and groups.    Legal Status:  Patient is Committed " with Redman in place.    Disposition: Pursuing intensive residential treatment services, however, the patient will require active medical assistance to gain admission to a treatment facility.  Medical assistance application has been completed and submitted to his county and awaiting their decision.  Once approved, applications will be submitted to IRTS facilities.    Ann Marie Woods MD  [facility] Psychiatry

## 2019-12-19 PROCEDURE — 12400001 ZZH R&B MH UMMC

## 2019-12-19 PROCEDURE — H2032 ACTIVITY THERAPY, PER 15 MIN: HCPCS

## 2019-12-19 PROCEDURE — 99232 SBSQ HOSP IP/OBS MODERATE 35: CPT | Performed by: PSYCHIATRY & NEUROLOGY

## 2019-12-19 PROCEDURE — 25000132 ZZH RX MED GY IP 250 OP 250 PS 637: Performed by: PSYCHIATRY & NEUROLOGY

## 2019-12-19 PROCEDURE — G0177 OPPS/PHP; TRAIN & EDUC SERV: HCPCS

## 2019-12-19 PROCEDURE — 25000132 ZZH RX MED GY IP 250 OP 250 PS 637: Mod: GY | Performed by: PSYCHIATRY & NEUROLOGY

## 2019-12-19 RX ADMIN — LORAZEPAM 1 MG: 1 TABLET ORAL at 14:06

## 2019-12-19 RX ADMIN — LORAZEPAM 1 MG: 1 TABLET ORAL at 07:48

## 2019-12-19 RX ADMIN — OMEPRAZOLE 20 MG: 20 CAPSULE, DELAYED RELEASE ORAL at 11:05

## 2019-12-19 RX ADMIN — ALUMINUM HYDROXIDE, MAGNESIUM HYDROXIDE, AND DIMETHICONE 30 ML: 400; 400; 40 SUSPENSION ORAL at 05:14

## 2019-12-19 RX ADMIN — METFORMIN HYDROCHLORIDE 500 MG: 500 TABLET ORAL at 07:48

## 2019-12-19 RX ADMIN — TRAZODONE HYDROCHLORIDE 200 MG: 100 TABLET ORAL at 20:04

## 2019-12-19 RX ADMIN — OLANZAPINE 20 MG: 10 TABLET, ORALLY DISINTEGRATING ORAL at 20:04

## 2019-12-19 RX ADMIN — METFORMIN HYDROCHLORIDE 500 MG: 500 TABLET ORAL at 17:47

## 2019-12-19 RX ADMIN — LORAZEPAM 1 MG: 1 TABLET ORAL at 20:04

## 2019-12-19 RX ADMIN — RAMELTEON 8 MG: 8 TABLET, FILM COATED ORAL at 20:04

## 2019-12-19 ASSESSMENT — ACTIVITIES OF DAILY LIVING (ADL)
LAUNDRY: WITH SUPERVISION
HYGIENE/GROOMING: INDEPENDENT
ORAL_HYGIENE: INDEPENDENT
DRESS: SCRUBS (BEHAVIORAL HEALTH);INDEPENDENT

## 2019-12-19 NOTE — PROGRESS NOTES
"   12/18/19 1700   Group Therapy Session   Group Attendance attended group session   Total Time (minutes) 45   Group Type psychotherapeutic   Group Topic Covered other (see comments)   Patient Participation/Contribution cooperative with task;discussed personal experience with topic;listened actively   Patient participated in psychotherapy group which included a mindfulness activity focusing on the 5 senses and then processing as a group.  Hermann reported feeling \"pretty good.\"  He presented in a pleasant, engaged mood. He appeared to be grinding his teeth more than this writer has noticed during past groups.  Insight around utilizing senses to help with coping/calming was limited. He engaged in the activity and group discussion.  "

## 2019-12-19 NOTE — PROGRESS NOTES
"[facility], [place]   Psychiatric Progress Note    Length of stay (days): 46        Interim History:   The patient's care was discussed with the treatment team during the daily team meeting and/or staff's chart notes were reviewed.  Staff report: patient has been visible in the milieu, attending some groups, taking medications as prescribed, reported being excited about interview, no acute events overnight.      Upon interview writer introduced themselves and let patient know that they are covering for Dr. Woods this week.  He reports that his mood is \"good\" and he slept well last night, denies having any safety concerns including thoughts of harming self or others.  Denies having any hallucinations or paranoia.  He continues to tolerate his medications and his only physical health complaint is that he is experiencing some reflux and would like some medication for this.  He reports he has taken omeprazole in the past and this will be ordered for him again.  He states he is anxious but excited for the interviews that he has later today and tomorrow for placement upon discharge.  No additional concerns at this time.         Medications:       LORazepam  1 mg Oral TID     metFORMIN  500 mg Oral BID w/meals     OLANZapine zydis  20 mg Oral At Bedtime    Or     OLANZapine  10 mg Intramuscular At Bedtime     ramelteon  8 mg Oral At Bedtime     traZODone  200 mg Oral At Bedtime          Allergies:   No Known Allergies       Labs:   No results found for this or any previous visit (from the past 24 hour(s)).       Psychiatric Examination:     /53   Pulse 86   Temp 96.9  F (36.1  C) (Oral)   Resp 18   Ht 1.702 m (5' 7\")   Wt 75.3 kg (166 lb)   SpO2 94%   BMI 26.00 kg/m    Weight is 166 lbs 0 oz  Body mass index is 26 kg/m .  Orthostatic Vitals     None        Appearance: casually dressed and less disheveled, audibly grinding his teeth and patient reports this is not new for " "him  Attitude:  still guarded but cooperative  Eye Contact:  fair  Mood:  \"good\"  Affect:  intensity is flat  Speech:  clear, coherent  Psychomotor Behavior:  no evidence of tardive dyskinesia, dystonia, or tics  Thought Process:  linear  Associations:  no loose associations  Thought Content:  no evidence of suicidal ideation or homicidal ideation, no evidence of psychotic thought and patient denying AH/VH and paranoia  Insight:  limited  Judgement:  limited  Oriented to:  Person and place  Attention Span and Concentration:  fair  Recent and Remote Memory:  fair    Clinical Global Impressions  First:  Considering your total clinical experience with this particular patient population, how severe are the patient's symptoms at this time?: 7 (11/03/19 0907)  Compared to the patient's condition at the START of treatment, this patient's condition is:: 4 (11/03/19 0907)  Most recent:  Considering your total clinical experience with this particular patient population, how severe are the patient's symptoms at this time?: 7 (11/21/19 1524)  Compared to the patient's condition at the START of treatment, this patient's condition is:: 4 (11/21/19 1524)         Precautions:     Behavioral Orders   Procedures     Code 1 - Restrict to Unit     Routine Programming     As clinically indicated     Sexual precautions     Status 15     Every 15 minutes.          Diagnoses:     Schizophrenia, decompensated         Plan:     Continue current med regimen without changes. Increased Zyprexa to 20 mg at bedtime on 12/2 to better address psychotic symptoms, which have since improved.  [facility] order is in place.     Added metformin 500 mg BID for neuroleptic induced wt gain.    Currently on a trial of Ativan to treat catatonic symptoms. Appears to be helping.     Psychosocial treatments to be addressed with social work consult and groups.    Legal Status:  Patient is Committed with [facility] in place.    Disposition: Pursuing intensive residential " treatment services, however, the patient will require active medical assistance to gain admission to a treatment facility.  Medical assistance application has been completed and submitted to his county and awaiting their decision.  Once approved, applications will be submitted to IRTS facilities. Patient has interview with [facility] this week per [facility].           Patient has been seen and evaluated by me, Kelsea Salgado DO covering for Dr. Woods.     Kelsea Salgado DO   [facility] Psychiatry

## 2019-12-19 NOTE — PLAN OF CARE
Patient has been out of room today attended a couple of groups with participation.  He has been medication compliant , and cooperative on the unit. Smiles and jokes with staff and peers. Was proud of a project he made for his daughter.

## 2019-12-19 NOTE — PROGRESS NOTES
Hermann was pleasant to staff and peers, he was seen playing game with peers and socializing.He was visibile in milieu until bed time.  He attending and participating in groups. Hermann denied all mental health symptoms including SI/SIB ideation. He is excited about his interview tomorrow, he shaved and washed his clothes.        12/18/19 8565   Behavioral Health   Hallucinations denies / not responding to hallucinations   Thinking intact   Orientation time: oriented;date: oriented;place: oriented;person: oriented   Memory baseline memory   Insight poor   Judgement impaired   Eye Contact at examiner   Affect full range affect   Mood mood is calm   Physical Appearance/Attire attire appropriate to age and situation;neat   Hygiene well groomed   1. Wish to be Dead (Recent) No   2. Non-Specific Active Suicidal Thoughts (Recent) No   Psycho Education   Type of Intervention 1:1 intervention   Response participates, initiates socially appropriate   Hours 0.5   Activities of Daily Living   Hygiene/Grooming handwashing;independent;shower;total care   Oral Hygiene total care;independent   Dress total care;street clothes;independent   Laundry with supervision   Room Organization independent

## 2019-12-19 NOTE — PLAN OF CARE
"Patient participated in 1 and a partial group. He was engaged in a group on gratitude and completed a project to support on going gratitude. Making gratitude lists to be laminated so they could function as a dry erase board, he did not follow instructions not to writer his list on the paper. He was not interested in practicing gratitude although when prompted identified sources like family, friends, waking up for another day. He demosntrated focus on project and completed with attention to detail and intentional planning of colors. Writer validated his attention and intention and asked why he does not color or engage with projects more often. He endorsed he doesn't \"connect\" with projects. Writer encouraged different ideas for clinic group to which he declined each one. We talked about the benefit of engaging in these tasks as both a distraction while he awaits discharge placements and to help manage stress and frustration. Pleasant in group, stared a lot at writer, used humor appropriately, also used some inappropriate teasing humor needing cues to stop which he accepted.   "

## 2019-12-19 NOTE — PROGRESS NOTES
"WR spoke with Emilia [phone], PO on call for the day in [facility]. WR needed to inquire about Hermann's legal status and any pertinent information. Hermann began probation on Oc22, 2019 due to disorderly conduct and retail theft. WR received specific dates and information. His PO is Whit Tong, she is out this week. Hermann has a warrant issues for his arrest but if he contacts the county to report his whereabouts and that he is successfully treating his MI they will \"work with him\" and waive the warrant.   "

## 2019-12-20 PROCEDURE — 99232 SBSQ HOSP IP/OBS MODERATE 35: CPT | Performed by: PSYCHIATRY & NEUROLOGY

## 2019-12-20 PROCEDURE — 25000132 ZZH RX MED GY IP 250 OP 250 PS 637: Performed by: PSYCHIATRY & NEUROLOGY

## 2019-12-20 PROCEDURE — 12400001 ZZH R&B MH UMMC

## 2019-12-20 PROCEDURE — 25000132 ZZH RX MED GY IP 250 OP 250 PS 637: Mod: GY | Performed by: PSYCHIATRY & NEUROLOGY

## 2019-12-20 RX ADMIN — OLANZAPINE 20 MG: 10 TABLET, ORALLY DISINTEGRATING ORAL at 20:05

## 2019-12-20 RX ADMIN — RAMELTEON 8 MG: 8 TABLET, FILM COATED ORAL at 20:05

## 2019-12-20 RX ADMIN — METFORMIN HYDROCHLORIDE 500 MG: 500 TABLET ORAL at 17:54

## 2019-12-20 RX ADMIN — METFORMIN HYDROCHLORIDE 500 MG: 500 TABLET ORAL at 08:54

## 2019-12-20 RX ADMIN — OMEPRAZOLE 20 MG: 20 CAPSULE, DELAYED RELEASE ORAL at 08:54

## 2019-12-20 RX ADMIN — LORAZEPAM 1 MG: 1 TABLET ORAL at 20:05

## 2019-12-20 RX ADMIN — TRAZODONE HYDROCHLORIDE 200 MG: 100 TABLET ORAL at 20:05

## 2019-12-20 RX ADMIN — LORAZEPAM 1 MG: 1 TABLET ORAL at 13:48

## 2019-12-20 RX ADMIN — LORAZEPAM 1 MG: 1 TABLET ORAL at 08:54

## 2019-12-20 NOTE — PROGRESS NOTES
"[facility], [place]   Psychiatric Progress Note    Length of stay (days): 47        Interim History:   The patient's care was discussed with the treatment team during the daily team meeting and/or staff's chart notes were reviewed.  Staff report: patient continues to be visible in the milieu at times, taking medications as prescribed, no acute events overnight.     Patient was lying in bed but was awake prior to interview.  He reports that his mood is \"good\".  He is tolerating his medications and reports he had some nausea and he believes that this may be due to the medications but he is willing to see if it improves and continue current regimen.  He did take the omeprazole and believes this is helping with reflux.  Denies having any auditory or visual hallucinations.  Denies having any suicidal or homicidal ideation.  Reports that his interview yesterday was canceled but he still has 1 scheduled for today.  He is looking forward to this.  No additional concerns at this time.         Medications:       LORazepam  1 mg Oral TID     metFORMIN  500 mg Oral BID w/meals     OLANZapine zydis  20 mg Oral At Bedtime    Or     OLANZapine  10 mg Intramuscular At Bedtime     omeprazole  20 mg Oral QAM AC     ramelteon  8 mg Oral At Bedtime     traZODone  200 mg Oral At Bedtime          Allergies:   No Known Allergies       Labs:   No results found for this or any previous visit (from the past 24 hour(s)).       Psychiatric Examination:     /53   Pulse 86   Temp 96.9  F (36.1  C) (Oral)   Resp 18   Ht 1.702 m (5' 7\")   Wt 75.3 kg (166 lb)   SpO2 94%   BMI 26.00 kg/m    Weight is 166 lbs 0 oz  Body mass index is 26 kg/m .  Orthostatic Vitals     None        Appearance: casually dressed and less disheveled, was not grinding his teeth today during interview  Attitude:  still guarded but cooperative  Eye Contact:  fair  Mood:  \"good\"  Affect:  intensity is flat  Speech:  clear, " coherent  Psychomotor Behavior:  no evidence of tardive dyskinesia, dystonia, or tics  Thought Process:  linear  Associations:  no loose associations  Thought Content:  no evidence of suicidal ideation or homicidal ideation, no evidence of psychotic thought and patient denying AH/VH and paranoia  Insight:  limited  Judgement:  limited  Oriented to:  Person and place  Attention Span and Concentration:  fair  Recent and Remote Memory:  fair    Clinical Global Impressions  First:  Considering your total clinical experience with this particular patient population, how severe are the patient's symptoms at this time?: 7 (11/03/19 0907)  Compared to the patient's condition at the START of treatment, this patient's condition is:: 4 (11/03/19 0907)  Most recent:  Considering your total clinical experience with this particular patient population, how severe are the patient's symptoms at this time?: 7 (11/21/19 1524)  Compared to the patient's condition at the START of treatment, this patient's condition is:: 4 (11/21/19 1524)         Precautions:     Behavioral Orders   Procedures     Code 1 - Restrict to Unit     Routine Programming     As clinically indicated     Sexual precautions     Status 15     Every 15 minutes.          Diagnoses:     Schizophrenia, decompensated         Plan:     Continue current med regimen without changes. Increased Zyprexa to 20 mg at bedtime on 12/2 to better address psychotic symptoms, which have since improved.  [facility] order is in place.     Added metformin 500 mg BID for neuroleptic induced wt gain.    Currently on a trial of Ativan to treat catatonic symptoms. Appears to be helping.     Psychosocial treatments to be addressed with social work consult and groups.    Legal Status:  Patient is Committed with [facility] in place.    Disposition: Pursuing intensive residential treatment services. MA has now been approved and is active. Patient's interview with [facility] scheduled for 12/19 was  cancelled but has interview with [facility] 12/20.     Patient has been seen and evaluated by me, Kelsea Salgado DO covering for Dr. Woods.     Kelsea Salgado DO   [facility] Psychiatry

## 2019-12-20 NOTE — PROGRESS NOTES
"Patient has spent majority of the shift pacing up and down the hallway. Ate dinner and snacks. Patient denies SI and SIB. Denies anxiety and depression. Denies auditory or visual hallucinations. Looking forward to having his interview tomorrow. Affect is bright and social with peers and staff. Med compliant.     Patient evaluation continues. Assessed mood,anxiety,thoughts and behavior.     Patient gradually progressing towards goals.    Patient is encouraged to participate in groups and assisted to develop healthy coping skills.     VS reviewed: /53   Pulse 86   Temp 96.9  F (36.1  C) (Oral)   Resp 18   Ht 1.702 m (5' 7\")   Wt 75.3 kg (166 lb)   SpO2 94%   BMI 26.00 kg/m      Length of stay: 46    Refer to daily team meeting notes for individualized plan of care. Nursing will continue to assess.      "

## 2019-12-20 NOTE — PROGRESS NOTES
SHILPI contacted EA and Hermann is approved for MA retroactive to August 1 2019.         2:40PM SHILPI completed Initial Pre-Admission Screening (PAS) for admission for Hermann to [facility].       2:50PM SHILPI faxed Commitment paperwork to Nathalie Du [phone] at [facility].

## 2019-12-20 NOTE — PROGRESS NOTES
12/19/19 [facility] [place][note 2]   Therapeutic Recreation   Type of Intervention structured groups   Activity game   Response Participates with encouragement   Hours 1     Pt participated in Therapeutic Recreation group with focus on stress reduction, creative expression, and leisure participation. Engaged and cooperative in a recreational activity via a group drawing game. Pt participated for the first part of the group, but chose to observe the second activity. Pt would grind his teeth, loud enough to hear from across the table. Showed progress in session goals. Pt mood was calm and quiet.

## 2019-12-21 PROCEDURE — 25000132 ZZH RX MED GY IP 250 OP 250 PS 637: Performed by: PSYCHIATRY & NEUROLOGY

## 2019-12-21 PROCEDURE — 25000132 ZZH RX MED GY IP 250 OP 250 PS 637: Mod: GY | Performed by: PSYCHIATRY & NEUROLOGY

## 2019-12-21 PROCEDURE — 12400001 ZZH R&B MH UMMC

## 2019-12-21 PROCEDURE — H2032 ACTIVITY THERAPY, PER 15 MIN: HCPCS

## 2019-12-21 RX ADMIN — LORAZEPAM 1 MG: 1 TABLET ORAL at 14:06

## 2019-12-21 RX ADMIN — METFORMIN HYDROCHLORIDE 500 MG: 500 TABLET ORAL at 17:57

## 2019-12-21 RX ADMIN — OLANZAPINE 20 MG: 10 TABLET, ORALLY DISINTEGRATING ORAL at 20:00

## 2019-12-21 RX ADMIN — LORAZEPAM 1 MG: 1 TABLET ORAL at 08:04

## 2019-12-21 RX ADMIN — OMEPRAZOLE 20 MG: 20 CAPSULE, DELAYED RELEASE ORAL at 08:04

## 2019-12-21 RX ADMIN — ALUMINUM HYDROXIDE, MAGNESIUM HYDROXIDE, AND DIMETHICONE 30 ML: 400; 400; 40 SUSPENSION ORAL at 04:26

## 2019-12-21 RX ADMIN — TRAZODONE HYDROCHLORIDE 200 MG: 100 TABLET ORAL at 20:00

## 2019-12-21 RX ADMIN — METFORMIN HYDROCHLORIDE 500 MG: 500 TABLET ORAL at 08:04

## 2019-12-21 RX ADMIN — RAMELTEON 8 MG: 8 TABLET, FILM COATED ORAL at 20:00

## 2019-12-21 RX ADMIN — LORAZEPAM 1 MG: 1 TABLET ORAL at 20:00

## 2019-12-21 ASSESSMENT — ACTIVITIES OF DAILY LIVING (ADL)
HYGIENE/GROOMING: INDEPENDENT
ORAL_HYGIENE: INDEPENDENT
ORAL_HYGIENE: INDEPENDENT
HYGIENE/GROOMING: INDEPENDENT
DRESS: INDEPENDENT;PROMPTS
DRESS: INDEPENDENT;STREET CLOTHES
LAUNDRY: WITH SUPERVISION

## 2019-12-21 NOTE — PROVIDER NOTIFICATION
"   12/20/19 2200   Behavioral Health   Hallucinations denies / not responding to hallucinations   Thinking intact   Orientation person: oriented;place: oriented   Memory baseline memory   Insight poor   Judgement impaired   Eye Contact at examiner   Affect other (see comments)   Mood mood is calm   Physical Appearance/Attire attire appropriate to age and situation   Hygiene other (see comment)   Suicidality other (see comments)   1. Wish to be Dead (Recent) No   2. Non-Specific Active Suicidal Thoughts (Recent) No   3. Active Sucidal Ideation with any Methods (Not Plan) Without Intent to Act (Recent) No     Pt present in milieu and paced yun at times. Pt denied all mental health issues. Pt states \"I am waiting for a bed and believe I will be discharged Monday or Tuesday\". Pt pleasant and cooperative.  "

## 2019-12-21 NOTE — PLAN OF CARE
Patient has been sleeping on and off through out day.  States feeling pretty good. ' I have been to a Irts before I know what they are like'  Appears optimistic and agreeable with plan.  He has been compliant with her medications and cooperative on the unit. Out watching football when out of room, social with staff.

## 2019-12-22 PROCEDURE — 25000132 ZZH RX MED GY IP 250 OP 250 PS 637: Mod: GY | Performed by: PSYCHIATRY & NEUROLOGY

## 2019-12-22 PROCEDURE — 25000132 ZZH RX MED GY IP 250 OP 250 PS 637: Performed by: PSYCHIATRY & NEUROLOGY

## 2019-12-22 PROCEDURE — 12400001 ZZH R&B MH UMMC

## 2019-12-22 RX ADMIN — TRAZODONE HYDROCHLORIDE 200 MG: 100 TABLET ORAL at 20:10

## 2019-12-22 RX ADMIN — METFORMIN HYDROCHLORIDE 500 MG: 500 TABLET ORAL at 08:18

## 2019-12-22 RX ADMIN — LORAZEPAM 1 MG: 1 TABLET ORAL at 14:17

## 2019-12-22 RX ADMIN — METFORMIN HYDROCHLORIDE 500 MG: 500 TABLET ORAL at 17:01

## 2019-12-22 RX ADMIN — OMEPRAZOLE 20 MG: 20 CAPSULE, DELAYED RELEASE ORAL at 08:18

## 2019-12-22 RX ADMIN — OLANZAPINE 20 MG: 10 TABLET, ORALLY DISINTEGRATING ORAL at 20:10

## 2019-12-22 RX ADMIN — LORAZEPAM 1 MG: 1 TABLET ORAL at 08:18

## 2019-12-22 RX ADMIN — RAMELTEON 8 MG: 8 TABLET, FILM COATED ORAL at 20:10

## 2019-12-22 RX ADMIN — LORAZEPAM 1 MG: 1 TABLET ORAL at 20:10

## 2019-12-22 ASSESSMENT — ACTIVITIES OF DAILY LIVING (ADL)
DRESS: INDEPENDENT
LAUNDRY: WITH SUPERVISION
DRESS: INDEPENDENT;STREET CLOTHES
ORAL_HYGIENE: INDEPENDENT
ORAL_HYGIENE: INDEPENDENT
HYGIENE/GROOMING: INDEPENDENT
HYGIENE/GROOMING: INDEPENDENT

## 2019-12-22 NOTE — PROGRESS NOTES
.Patient had a good shift.    Patient did not require seclusion/restraints to manage behavior.    Hermann Arita did not participate in groups and was visible in the milieu.    Notable mental health symptoms during this shift:None  Patient is working on these coping/social skills: Distraction      Other information about this shift: Pt denies SI and SIB, Pt stated he had no depression or anxiety at the moment. Pt is looking forward to going home next week. Pt did not need any redirection during shift. Pt declined vitals and kept to self for majority of the shift, often pacing the halls.     12/22/19 1307   Behavioral Health   Hallucinations denies / not responding to hallucinations   Thinking distractable   Orientation person: oriented;place: oriented;date: oriented;time: oriented   Memory baseline memory   Insight poor   Judgement impaired   Eye Contact at examiner   Affect blunted, flat   Mood mood is calm   Physical Appearance/Attire disheveled   Hygiene neglected grooming - unclean body, hair, teeth   Suicidality other (see comments)  (Pt denies)   1. Wish to be Dead (Recent) No   2. Non-Specific Active Suicidal Thoughts (Recent) No   Self Injury other (see comment)  (Pt denies)   Activity isolative;withdrawn   Speech clear;coherent   Medication Sensitivity no observed side effects   Psychomotor / Gait balanced;steady   Activities of Daily Living   Hygiene/Grooming independent   Oral Hygiene independent   Dress independent   Laundry with supervision   Room Organization independent

## 2019-12-22 NOTE — PROGRESS NOTES
Pt up many times throughout the night. Pt asking for snacks/juice each time which were given. Pt states his medication makes him very hungry at night and not as hungry during the day. Pt was able to go back to sleep quite quickly in between waking. Slept 6 hours.

## 2019-12-22 NOTE — PROGRESS NOTES
Pt was in a good mood this evening. He spent his time pacing in the hallway, watching tv, and attending groups. He was calm and cooperative with staff. He neglected his hygiene. His goal for the evening was to have a positive attitude, which he feels he was able to do. He denies any mental health concerns. He plans to go to an IRTS after discharging from here, which he thinks may happen on Monday or Tuesday.     12/21/19 2200   Behavioral Health   Hallucinations denies / not responding to hallucinations   Thinking intact   Orientation person: oriented;place: oriented;date: oriented;time: oriented   Memory baseline memory   Insight insight appropriate to situation;insight appropriate to events   Judgement intact   Eye Contact at examiner   Affect full range affect   Mood mood is calm   Physical Appearance/Attire disheveled   Hygiene neglected grooming - unclean body, hair, teeth   Suicidality other (see comments)  (denies)   1. Wish to be Dead (Recent) No   2. Non-Specific Active Suicidal Thoughts (Recent) No   Self Injury other (see comment)  (denies)   Elopement   (none)   Activity other (see comment)  (visible in the milieu)   Speech clear;coherent   Medication Sensitivity no stated side effects;no observed side effects   Psychomotor / Gait balanced;steady   Activities of Daily Living   Hygiene/Grooming independent   Oral Hygiene independent   Dress independent;street clothes   Room Organization independent

## 2019-12-22 NOTE — PROGRESS NOTES
12/21/19 [facility]   Therapeutic Recreation   Type of Intervention structured groups   Activity game   Response Participates with encouragement   Hours 0.5     Pt attended the structured Therapeutic Recreation group with a focus on leisure participation, stress reduction, and social engagement via a group game. Pt entered group late, but remained focused and engaged for the remainder of the group. Pt chose to participate in the group activity, but decided to not take a turn in the game.  Showed progress in session goals. Pt mood was calm and was appropriate with interactions.  Appropriately shared sense of humor with peers during the group and appeared to brighten with social interaction.

## 2019-12-23 PROCEDURE — 99232 SBSQ HOSP IP/OBS MODERATE 35: CPT | Performed by: PSYCHIATRY & NEUROLOGY

## 2019-12-23 PROCEDURE — 12400001 ZZH R&B MH UMMC

## 2019-12-23 PROCEDURE — 25000132 ZZH RX MED GY IP 250 OP 250 PS 637: Mod: GY | Performed by: PSYCHIATRY & NEUROLOGY

## 2019-12-23 PROCEDURE — 90853 GROUP PSYCHOTHERAPY: CPT

## 2019-12-23 RX ADMIN — RAMELTEON 8 MG: 8 TABLET, FILM COATED ORAL at 20:24

## 2019-12-23 RX ADMIN — LORAZEPAM 1 MG: 1 TABLET ORAL at 08:17

## 2019-12-23 RX ADMIN — LORAZEPAM 1 MG: 1 TABLET ORAL at 13:45

## 2019-12-23 RX ADMIN — TRAZODONE HYDROCHLORIDE 200 MG: 100 TABLET ORAL at 20:24

## 2019-12-23 RX ADMIN — LORAZEPAM 1 MG: 1 TABLET ORAL at 20:25

## 2019-12-23 RX ADMIN — METFORMIN HYDROCHLORIDE 500 MG: 500 TABLET ORAL at 17:12

## 2019-12-23 RX ADMIN — OMEPRAZOLE 20 MG: 20 CAPSULE, DELAYED RELEASE ORAL at 08:17

## 2019-12-23 RX ADMIN — METFORMIN HYDROCHLORIDE 500 MG: 500 TABLET ORAL at 08:17

## 2019-12-23 RX ADMIN — OLANZAPINE 20 MG: 10 TABLET, ORALLY DISINTEGRATING ORAL at 20:25

## 2019-12-23 RX ADMIN — ALUMINUM HYDROXIDE, MAGNESIUM HYDROXIDE, AND DIMETHICONE 30 ML: 400; 400; 40 SUSPENSION ORAL at 04:18

## 2019-12-23 ASSESSMENT — ACTIVITIES OF DAILY LIVING (ADL)
HYGIENE/GROOMING: INDEPENDENT
ORAL_HYGIENE: INDEPENDENT
DRESS: INDEPENDENT
LAUNDRY: WITH SUPERVISION

## 2019-12-23 NOTE — PLAN OF CARE
"Pt has been visible in the milieu, social with others at times.   Pt reports sleep and appetite as being good.  Pt denies auditory and visual hallucinations. Pt was med compliant this morning and reports doing well and states \"I am just waiting for placement\".  No SI.    "

## 2019-12-23 NOTE — PROGRESS NOTES
Pt was in a neutral, bored mood this evening. He spent a majority of his time pacing in the hallway, though he watched tv at times as well. He didn't interact with peers or staff much. He neglected his hygiene ADLs. He was calm and generally cooperative with staff. He declined to have his vitals taken, though he agreed to check in with the writer. He did not have a goal for the evening. He denies any SI, SIB, or hallucinations. He plans on discharging to go to an IRTS on either Monday or Tuesday.      12/22/19 2100   Behavioral Health   Hallucinations denies / not responding to hallucinations   Thinking intact   Orientation person: oriented;place: oriented;date: oriented;time: oriented   Memory baseline memory   Insight insight appropriate to situation;insight appropriate to events   Judgement intact   Eye Contact at examiner   Affect blunted, flat   Mood mood is calm   Physical Appearance/Attire disheveled;appears stated age;attire appropriate to age and situation   Hygiene neglected grooming - unclean body, hair, teeth   Suicidality other (see comments)  (denies)   1. Wish to be Dead (Recent) No   2. Non-Specific Active Suicidal Thoughts (Recent) No   Self Injury other (see comment)  (denies)   Elopement   (none)   Activity other (see comment)  (visible in milieu)   Speech clear;coherent   Medication Sensitivity no stated side effects;no observed side effects   Psychomotor / Gait balanced;steady   Activities of Daily Living   Hygiene/Grooming independent   Oral Hygiene independent   Dress independent;street clothes   Room Organization independent

## 2019-12-23 NOTE — PROGRESS NOTES
"[facility], [place]   Psychiatric Progress Note    Length of stay (days): 50        Interim History:   The patient's care was discussed with the treatment team during the daily team meeting and/or staff's chart notes were reviewed.  Staff report patient has been visible in the milieu over weekend, progressing towards discharge, taking medications as prescribed, no acute events over weekend.    Patient reports his mood is \"good\" and that his interview on Friday went well.  He continues to wait to hear about being accepted so that discharge planning can move forward.  He continues to feel that he is ready to discharge.  He denies having any safety concerns including thoughts of hurting self or others.  He denies any auditory hallucinations or visual hallucinations or paranoia.  He is tolerating his medication and denied any side effects.  Talked about the process of provisional discharge and that it would be important for him to continue to take his medications and to follow recommendations made by outpatient treatment team and he expressed understanding.  No additional concerns at this time.         Medications:       LORazepam  1 mg Oral TID     metFORMIN  500 mg Oral BID w/meals     OLANZapine zydis  20 mg Oral At Bedtime    Or     OLANZapine  10 mg Intramuscular At Bedtime     omeprazole  20 mg Oral QAM AC     ramelteon  8 mg Oral At Bedtime     traZODone  200 mg Oral At Bedtime          Allergies:   No Known Allergies       Labs:   No results found for this or any previous visit (from the past 24 hour(s)).       Psychiatric Examination:     /53   Pulse 86   Temp 96.9  F (36.1  C) (Oral)   Resp 18   Ht 1.702 m (5' 7\")   Wt 75.3 kg (166 lb)   SpO2 94%   BMI 26.00 kg/m    Weight is 166 lbs 0 oz  Body mass index is 26 kg/m .  Orthostatic Vitals     None          Appearance: casually dressed and less disheveled, some grinding his teeth today during interview, which is chronic " "for him  Attitude:  still guarded but cooperative  Eye Contact:  fair  Mood:  \"good\"  Affect:  intensity is flat  Speech:  clear, coherent  Psychomotor Behavior:  no evidence of tardive dyskinesia, dystonia, or tics  Thought Process:  linear  Associations:  no loose associations  Thought Content:  no evidence of suicidal ideation or homicidal ideation, no evidence of psychotic thought and patient denying AH/VH and paranoia  Insight:  limited  Judgement:  limited  Oriented to:  Person and place  Attention Span and Concentration:  fair  Recent and Remote Memory:  fair    Clinical Global Impressions  First:  Considering your total clinical experience with this particular patient population, how severe are the patient's symptoms at this time?: 7 (11/03/19 0907)  Compared to the patient's condition at the START of treatment, this patient's condition is:: 4 (11/03/19 0907)  Most recent:  Considering your total clinical experience with this particular patient population, how severe are the patient's symptoms at this time?: 7 (11/21/19 1524)  Compared to the patient's condition at the START of treatment, this patient's condition is:: 4 (11/21/19 1524)         Precautions:     Behavioral Orders   Procedures     Code 1 - Restrict to Unit     Routine Programming     As clinically indicated     Sexual precautions     Status 15     Every 15 minutes.          Diagnoses:     Schizophrenia, decompensated         Plan:     Continue current med regimen without changes. Increased Zyprexa to 20 mg at bedtime on 12/2 to better address psychotic symptoms, which have since improved.  [facility] order is in place.     Added metformin 500 mg BID for neuroleptic induced wt gain.    Currently on a trial of Ativan to treat catatonic symptoms. Appears to be helping.     Psychosocial treatments to be addressed with social work consult and groups.    Legal Status:  Patient is Committed with [facility] in place.    Disposition: Pursuing intensive " residential treatment services. MA has now been approved and is active. Patient's interview with [facility] scheduled for 12/19 was cancelled and rescheduled for 12/23. Pt completed interview with [facility]. Team awaiting acceptance/plan for disposition.    Patient has been seen and evaluated by me, Kelsea Salgado DO covering for Dr. Woods.     Kelsea Salgado DO   [facility] Psychiatry

## 2019-12-23 NOTE — PROGRESS NOTES
Corina from [facility] called after the interview today and they will admit pt on Tuesday, December 31, 2019 between 10 and 11.    Coverage worker Carey and I consulted with Corina. It was decided to move forward with [facility] rather than [facility].    I informed pt.    He would prefer to go to [facility] because they said he could work there but does agree to be admitted to [facility].    He will need a psychiatry appointment.

## 2019-12-23 NOTE — PROGRESS NOTES
DISCHARGE PLANNING NOTE    Diagnosis/Procedure:   Patient Active Problem List   Diagnosis     Disorganized behavior      Barrier to discharge: Safe discharge placement    Today's Plan: Met with patient face to face to check in.  Placed call to Monet Du, admissions at [facility] ([phone]) to coordinate care.  States there is an elaborate process required for the patient's admission,  Patient interviewed with Ese from [facility] [phone].  She states that patient is accepted and can admit in the morning on 12/31/19.  Discharge plan for [facility] to move forward.    Prepared provisional discharge agreement(s).  Prepared fax cover sheet for Provisional Discharge agreement.  Placed call and left message for  Mildred Green ([phone]) to coordinate care.  Documents were placed on the [facility]'s desk.      Discharge plan or goal: Discharge as soon as safe placement at IR facility is found.      Care Rounds Attendance:   [facility]  KHADIJAH ZAVALA

## 2019-12-23 NOTE — PROGRESS NOTES
12/23/19 0832   Vital Signs   Temp   (refused)   Resp   (refused)   Pulse   (refused)   BP   (refused)

## 2019-12-24 PROCEDURE — 25000132 ZZH RX MED GY IP 250 OP 250 PS 637: Performed by: PSYCHIATRY & NEUROLOGY

## 2019-12-24 PROCEDURE — 12400001 ZZH R&B MH UMMC

## 2019-12-24 PROCEDURE — 25000132 ZZH RX MED GY IP 250 OP 250 PS 637: Mod: GY | Performed by: PSYCHIATRY & NEUROLOGY

## 2019-12-24 PROCEDURE — G0177 OPPS/PHP; TRAIN & EDUC SERV: HCPCS

## 2019-12-24 RX ADMIN — METFORMIN HYDROCHLORIDE 500 MG: 500 TABLET ORAL at 18:05

## 2019-12-24 RX ADMIN — LORAZEPAM 1 MG: 1 TABLET ORAL at 21:05

## 2019-12-24 RX ADMIN — TRAZODONE HYDROCHLORIDE 200 MG: 100 TABLET ORAL at 21:05

## 2019-12-24 RX ADMIN — RAMELTEON 8 MG: 8 TABLET, FILM COATED ORAL at 21:05

## 2019-12-24 RX ADMIN — LORAZEPAM 1 MG: 1 TABLET ORAL at 14:27

## 2019-12-24 RX ADMIN — LORAZEPAM 1 MG: 1 TABLET ORAL at 08:35

## 2019-12-24 RX ADMIN — OLANZAPINE 20 MG: 10 TABLET, ORALLY DISINTEGRATING ORAL at 21:05

## 2019-12-24 RX ADMIN — METFORMIN HYDROCHLORIDE 500 MG: 500 TABLET ORAL at 08:35

## 2019-12-24 RX ADMIN — OMEPRAZOLE 20 MG: 20 CAPSULE, DELAYED RELEASE ORAL at 08:35

## 2019-12-24 ASSESSMENT — ACTIVITIES OF DAILY LIVING (ADL)
ORAL_HYGIENE: INDEPENDENT
ORAL_HYGIENE: INDEPENDENT
LAUNDRY: WITH SUPERVISION
HYGIENE/GROOMING: INDEPENDENT
HYGIENE/GROOMING: INDEPENDENT
DRESS: STREET CLOTHES;SCRUBS (BEHAVIORAL HEALTH)
DRESS: INDEPENDENT;STREET CLOTHES

## 2019-12-24 NOTE — PROGRESS NOTES
Sally refused vital       12/24/19 1500   Vital Signs   Temp   (refused)   Resp   (Refused)   Pulse   (refused)   BP   (Refused)

## 2019-12-24 NOTE — PROGRESS NOTES
Nathalie from [facility] called and did not accept the message that Marisel would return the call on Thursday and sort this situation out so I took her call.    She was distressed that the pt was not coming there.   I did not know about the level 2 screening status.   I did not know where the paperwork was.    I did tell her about the admission on Tuesday but that the pt did prefer to go to [facility] since they could work there. Nathalie confirmed that this was the case.    Nathalie resent the paperwork to be filled out to Marisel and JIGAR.  Nathalie was calling the [facility] for an update.    The expectation is that Marisel will revisit this on Thursday morning and will work with Nathalie as to a best outcome for the pt.    She would like Marisel to call Lalo @ [phone] on Thursday to work with him.

## 2019-12-24 NOTE — PROGRESS NOTES
Alfonso had typical, uneventful shift. He was calm and cooperative with blunted, flat mood affect. He spent the majority part of the shift in his room resting, otherwise occasionally visibile in common areas for meals, and requests. He denied all mental health symptoms including SI/SIb ideation. His appetite was good and sleeping/napping well. He was behaviorally and socially appropriate on the unit          12/24/19 1149   Behavioral Health   Hallucinations denies / not responding to hallucinations   Thinking poor concentration   Orientation time: oriented;date: oriented;place: oriented   Memory baseline memory   Insight insight appropriate to events   Judgement impaired   Eye Contact at examiner   Affect blunted, flat   Mood mood is calm   Physical Appearance/Attire attire appropriate to age and situation   Hygiene neglected grooming - unclean body, hair, teeth   Suicidality   (denied )   1. Wish to be Dead (Recent) No   2. Non-Specific Active Suicidal Thoughts (Recent) No   Self Injury   (denied )   Elopement   (denied )   Activity withdrawn;isolative   Speech coherent;clear   Medication Sensitivity no observed side effects   Psychomotor / Gait balanced;steady   Psycho Education   Type of Intervention 1:1 intervention   Response participates, initiates socially appropriate   Hours 0.5   Activities of Daily Living   Hygiene/Grooming independent   Oral Hygiene independent   Dress street clothes;scrubs (behavioral health)   Laundry with supervision   Room Organization independent

## 2019-12-24 NOTE — PROGRESS NOTES
"   12/23/19 1700   Group Therapy Session   Group Attendance attended group session   Total Time (minutes) 50   Group Type psychotherapeutic   Group Topic Covered other (see comments)   Patient Participation/Contribution cooperative with task;discussed personal experience with topic;listened actively   Psychotherapy group goals: Build strengths and resilience by identifying positives about oneself \"I am, I have, I can\" and then process as a group.  Hermann reported feeling \"good.\" He presented in a pleasant mood.  He reported feeling frustrated that he has to wait another 8 days to be discharged and worries the time will go by slowly. The group validated his frustrations.  "

## 2019-12-24 NOTE — PLAN OF CARE
Patient participated in oT clinic and mindfulness based group. During OT clinic he was organized with materials and planned out a thoughtful design for his project using stencils and paint. He required several specific cues for through clean up, as he is prone to just standing around despite having made a significant mess of his space. He follows cues and will help when given specific instruction or asked to help. He attention to environmental surroundings appears quite limited, wiping his paint brushes on his pants, leaving bright magenta streaks, and not attending to environmental demands without specifics cues. He participated in mindfulness based group and required several cues for attention. He followed instructions and shared when prompted but answers were superficial.

## 2019-12-24 NOTE — PROGRESS NOTES
"Patient has spent majority of the shift [place] [facility] [place] Watched sports and was social with select peers and staff. Attended community meeting. Patient denies suicidal ideation and self injurious thoughts. Denies anxiety and depression. Denies auditory and visual hallucinations. Continues to have poor adls. Ate meals and snacks. Did some walking up and down the yun. Med compliant.     Patient evaluation continues. Assessed mood,anxiety,thoughts and behavior.     Patient gradually progressing towards goals.    Patient is encouraged to participate in groups and assisted to develop healthy coping skills.     VS reviewed: /53   Pulse 86   Temp 96.9  F (36.1  C) (Oral)   Resp 18   Ht 1.702 m (5' 7\")   Wt 75.3 kg (166 lb)   SpO2 94%   BMI 26.00 kg/m      Length of stay: 50    Refer to daily team meeting notes for individualized plan of care. Nursing will continue to assess.      "

## 2019-12-25 PROCEDURE — H2032 ACTIVITY THERAPY, PER 15 MIN: HCPCS

## 2019-12-25 PROCEDURE — 12400001 ZZH R&B MH UMMC

## 2019-12-25 PROCEDURE — 25000132 ZZH RX MED GY IP 250 OP 250 PS 637: Mod: GY | Performed by: PSYCHIATRY & NEUROLOGY

## 2019-12-25 PROCEDURE — 25000132 ZZH RX MED GY IP 250 OP 250 PS 637: Performed by: PSYCHIATRY & NEUROLOGY

## 2019-12-25 RX ADMIN — OLANZAPINE 20 MG: 10 TABLET, ORALLY DISINTEGRATING ORAL at 20:09

## 2019-12-25 RX ADMIN — METFORMIN HYDROCHLORIDE 500 MG: 500 TABLET ORAL at 17:40

## 2019-12-25 RX ADMIN — METFORMIN HYDROCHLORIDE 500 MG: 500 TABLET ORAL at 07:54

## 2019-12-25 RX ADMIN — TRAZODONE HYDROCHLORIDE 200 MG: 100 TABLET ORAL at 20:09

## 2019-12-25 RX ADMIN — OMEPRAZOLE 20 MG: 20 CAPSULE, DELAYED RELEASE ORAL at 07:54

## 2019-12-25 RX ADMIN — LORAZEPAM 1 MG: 1 TABLET ORAL at 14:11

## 2019-12-25 RX ADMIN — LORAZEPAM 1 MG: 1 TABLET ORAL at 07:54

## 2019-12-25 RX ADMIN — LORAZEPAM 1 MG: 1 TABLET ORAL at 20:09

## 2019-12-25 RX ADMIN — RAMELTEON 8 MG: 8 TABLET, FILM COATED ORAL at 20:09

## 2019-12-25 NOTE — PROGRESS NOTES
Pt was in a good mood this evening. He spent a large amount of his time resting in bed, though he came out of his room to watch part of a movie and pace the hallway at times. He had no behavioral concerns and was cooperative with staff. He denies any mental health concerns. He neglected his hygiene ADLs. He ate his dinner and a large portion of snacks. His goal for the evening was to have a positive attitude, which he feels he was able to do. He plans on discharging and going to an IRTS facility next Tuesday.      12/24/19 2200   Behavioral Health   Hallucinations denies / not responding to hallucinations   Thinking intact   Orientation person: oriented;place: oriented   Memory baseline memory   Insight insight appropriate to situation;insight appropriate to events   Judgement intact   Eye Contact at examiner   Affect blunted, flat   Mood mood is calm   Physical Appearance/Attire appears stated age;attire appropriate to age and situation   Hygiene neglected grooming - unclean body, hair, teeth   Suicidality other (see comments)  (denies)   1. Wish to be Dead (Recent) No   2. Non-Specific Active Suicidal Thoughts (Recent) No   Self Injury other (see comment)  (denies)   Elopement   (none)   Activity other (see comment)  (visible in milieu, sleeping)   Speech clear;coherent   Medication Sensitivity no stated side effects;no observed side effects   Psychomotor / Gait balanced;steady   Activities of Daily Living   Hygiene/Grooming independent   Oral Hygiene independent   Dress independent;street clothes   Room Organization independent

## 2019-12-25 NOTE — PROGRESS NOTES
"   12/25/19 7035   Behavioral Health   Hallucinations denies / not responding to hallucinations   Thinking distractable   Orientation place: oriented;date: oriented   Memory baseline memory   Insight insight appropriate to situation   Judgement intact   Eye Contact at examiner   Affect full range affect   Mood mood is calm   Physical Appearance/Attire attire appropriate to age and situation   Hygiene well groomed   Suicidality other (see comments)  (Denies)   1. Wish to be Dead (Recent) No   2. Non-Specific Active Suicidal Thoughts (Recent) No   Self Injury other (see comment)  (none observed or reported )   Elopement   (none observed or reported )   Activity other (see comment)  (awake and visible )   Speech clear   Medication Sensitivity no stated side effects   Psychomotor / Gait balanced     Pt was awake and visible in the afternoon. Affect is full ranged. Appearance is well groomed. Speech is clear. Pt is pleasant and polite upon approach. He stated he is looking forward to a possible discharge next week, but understand to speak with providers and CM to clarify discharge plans. Pt reports he is doing, \"pretty good today.\" His goal is to, \"stay positive,\" and to keep himself busy today. Pt ws observed to watch tv, social with peers, and attended groups. Denies thought to hurt himself or others. Contracts for safety.   "

## 2019-12-26 PROCEDURE — 25000132 ZZH RX MED GY IP 250 OP 250 PS 637: Performed by: PSYCHIATRY & NEUROLOGY

## 2019-12-26 PROCEDURE — 99232 SBSQ HOSP IP/OBS MODERATE 35: CPT | Performed by: PSYCHIATRY & NEUROLOGY

## 2019-12-26 PROCEDURE — 12400001 ZZH R&B MH UMMC

## 2019-12-26 PROCEDURE — G0177 OPPS/PHP; TRAIN & EDUC SERV: HCPCS

## 2019-12-26 PROCEDURE — 25000125 ZZHC RX 250: Performed by: PSYCHIATRY & NEUROLOGY

## 2019-12-26 PROCEDURE — 25000132 ZZH RX MED GY IP 250 OP 250 PS 637: Mod: GY | Performed by: PSYCHIATRY & NEUROLOGY

## 2019-12-26 PROCEDURE — H2032 ACTIVITY THERAPY, PER 15 MIN: HCPCS

## 2019-12-26 RX ORDER — TRAZODONE HYDROCHLORIDE 100 MG/1
200 TABLET ORAL AT BEDTIME
Qty: 60 TABLET | Refills: 0 | Status: ON HOLD | OUTPATIENT
Start: 2019-12-26 | End: 2020-02-13

## 2019-12-26 RX ORDER — TRAZODONE HYDROCHLORIDE 100 MG/1
200 TABLET ORAL AT BEDTIME
Qty: 2 TABLET | Refills: 0 | Status: ON HOLD | OUTPATIENT
Start: 2019-12-26 | End: 2020-01-30

## 2019-12-26 RX ORDER — RAMELTEON 8 MG/1
8 TABLET ORAL AT BEDTIME
Qty: 1 TABLET | Refills: 0 | Status: ON HOLD | OUTPATIENT
Start: 2019-12-26 | End: 2020-01-30

## 2019-12-26 RX ORDER — LORAZEPAM 1 MG/1
1 TABLET ORAL 3 TIMES DAILY
Qty: 3 TABLET | Refills: 0 | Status: SHIPPED | OUTPATIENT
Start: 2019-12-26 | End: 2019-12-31

## 2019-12-26 RX ORDER — OLANZAPINE 20 MG/1
20 TABLET, ORALLY DISINTEGRATING ORAL AT BEDTIME
Qty: 30 TABLET | Refills: 0 | Status: ON HOLD | OUTPATIENT
Start: 2019-12-26 | End: 2020-02-13

## 2019-12-26 RX ORDER — OLANZAPINE 20 MG/1
20 TABLET, ORALLY DISINTEGRATING ORAL AT BEDTIME
Qty: 30 TABLET | Refills: 0 | Status: SHIPPED | OUTPATIENT
Start: 2019-12-26 | End: 2019-12-26

## 2019-12-26 RX ORDER — TRAZODONE HYDROCHLORIDE 100 MG/1
200 TABLET ORAL AT BEDTIME
Qty: 60 TABLET | Refills: 0 | Status: SHIPPED | OUTPATIENT
Start: 2019-12-26 | End: 2019-12-26

## 2019-12-26 RX ORDER — LORAZEPAM 1 MG/1
1 TABLET ORAL 3 TIMES DAILY
Qty: 90 TABLET | Refills: 0 | Status: SHIPPED | OUTPATIENT
Start: 2019-12-26 | End: 2019-12-26

## 2019-12-26 RX ORDER — RAMELTEON 8 MG/1
8 TABLET ORAL AT BEDTIME
Qty: 30 TABLET | Refills: 0 | Status: ON HOLD | OUTPATIENT
Start: 2019-12-26 | End: 2020-02-13

## 2019-12-26 RX ORDER — RAMELTEON 8 MG/1
8 TABLET ORAL AT BEDTIME
Qty: 30 TABLET | Refills: 0 | Status: SHIPPED | OUTPATIENT
Start: 2019-12-26 | End: 2019-12-26

## 2019-12-26 RX ORDER — OLANZAPINE 20 MG/1
20 TABLET, ORALLY DISINTEGRATING ORAL AT BEDTIME
Qty: 1 TABLET | Refills: 0 | Status: ON HOLD | OUTPATIENT
Start: 2019-12-26 | End: 2020-01-30

## 2019-12-26 RX ADMIN — OLANZAPINE 20 MG: 10 TABLET, ORALLY DISINTEGRATING ORAL at 20:01

## 2019-12-26 RX ADMIN — Medication 5 UNITS: at 11:51

## 2019-12-26 RX ADMIN — LORAZEPAM 1 MG: 1 TABLET ORAL at 14:35

## 2019-12-26 RX ADMIN — RAMELTEON 8 MG: 8 TABLET, FILM COATED ORAL at 20:01

## 2019-12-26 RX ADMIN — TRAZODONE HYDROCHLORIDE 200 MG: 100 TABLET ORAL at 20:01

## 2019-12-26 RX ADMIN — LORAZEPAM 1 MG: 1 TABLET ORAL at 08:16

## 2019-12-26 RX ADMIN — LORAZEPAM 1 MG: 1 TABLET ORAL at 20:01

## 2019-12-26 RX ADMIN — OMEPRAZOLE 20 MG: 20 CAPSULE, DELAYED RELEASE ORAL at 08:16

## 2019-12-26 RX ADMIN — METFORMIN HYDROCHLORIDE 500 MG: 500 TABLET ORAL at 08:17

## 2019-12-26 RX ADMIN — METFORMIN HYDROCHLORIDE 500 MG: 500 TABLET ORAL at 17:01

## 2019-12-26 ASSESSMENT — ABNORMAL INVOLUNTARY MOVEMENT SCALE (AIMS)
TONGUE: 5
EDENTIA: NO
PATIENT_WEARS_DENTURES: NO
JAW: MODERATE
AIMS_PATIENT_AWARENESS: AWARE, NO DISTRESS
CURRENT_PROBLEMS_TEETH_DENTURES: NO
AIMS_DISAPPEAR_SLEEPING: YES
AIMS_SEVERITY: MINIMAL

## 2019-12-26 ASSESSMENT — ACTIVITIES OF DAILY LIVING (ADL)
ORAL_HYGIENE: INDEPENDENT
ORAL_HYGIENE: INDEPENDENT
DRESS: STREET CLOTHES;INDEPENDENT
DRESS: INDEPENDENT;SCRUBS (BEHAVIORAL HEALTH)
HYGIENE/GROOMING: INDEPENDENT
HYGIENE/GROOMING: INDEPENDENT

## 2019-12-26 NOTE — PROGRESS NOTES
Patient visible and active in the milieu throughout the shift today.  Has presented overall as bright, alert, pleasant, and cooperative on approach with unit protocols.  Peer interactions have been positive, respectful, and supportive.  Has been fully and productively engaged in available programming as well.  Has been open and responsive to writer's MH status inquiries, feedback, and necessary interventions.  Mood has been calm and stable.  Affect has been full-range and mood-congruent.  Verbalized thoughts have been linear, organized, and free of delusional references or overt psychotic distortions.  Behavior has been non-problematic.  Patient continues to deny all significant MH issues, concerns, and acuities.  Is excited about and waiting patiently for his discharge scheduled for next Monday, 12/30/19.   Davian Florence   12/26/2019 12/26/19 1108   Sleep/Rest/Relaxation   Day/Evening Time Hours up all shift   Cognitive   Level Of Consciousness alert   Arousal Level arouses to voice   Orientation oriented x 4   Follows Commands yes   Speech clear;spontaneous;logical   Best Language 0 - No aphasia   Mood/Behavior calm;cooperative;behavior appropriate to situation   Behavioral Health   Hallucinations denies / not responding to hallucinations   Thinking intact   Orientation person: oriented;place: oriented;date: oriented;time: oriented   Memory baseline memory   Insight denial of illness   Judgement intact   Eye Contact at examiner   Affect full range affect   Mood mood is calm   Physical Appearance/Attire disheveled   Hygiene other (see comment)  (adequate)   Suicidality other (see comments)   1. Wish to be Dead (Recent) Yes   2. Non-Specific Active Suicidal Thoughts (Recent) No   Self Injury other (see comment)  (denied SIB urges)   Elopement   (no indicators)   Psychomotor / Gait balanced;steady   Overt Aggression Scale   Verbal Aggression 0   Aggression against Property 0   Auto-Aggression 0   Physical  Aggression 0   Overt Aggression Total Score 0   Psycho Education   Type of Intervention 1:1 intervention   Response participates with encouragement   Hours 0.5   Treatment Detail current MH status   Safety   Suicidality Status 15   Activities of Daily Living   Hygiene/Grooming independent   Oral Hygiene independent   Dress street clothes;independent   Room Organization independent

## 2019-12-26 NOTE — PROGRESS NOTES
DISCHARGE PLANNING NOTE     Diagnosis/Procedure:       Patient Active Problem List   Diagnosis     Disorganized behavior       Barrier to discharge: Further required paperwork needs to be filed      Today's Plan: WR spoke with Lalo [phone] at [facility] about potential discharge of Hermann to [place]. Lalo is waiting for the Level 2 authorization from the [facility] and also needs provisional intake paperwork from Hermann's psych in [place]. WR will be seeing provider today to coordinate paperwork. Discharge date of 12/30/19 at 10am is set for Hermann to [facility]. WR will continue to coordinate by getting all necessary paperwork in order, arranging a medical cab ride for Hermann for discharge.       10:00am WR faxed provisional discharge paperwork to [facility] fax: [phone].     10:45 WR faxed H&P to Agige Baxter F: [phone] P: [phone] at [facility] as per request for level 2 screen.     Discharge plan or goal: Discharge Monday at 10:00 to [facility].       Care Rounds Attendance:   CTC  KHADIJAH ZAVALA

## 2019-12-26 NOTE — PLAN OF CARE
Patient participated in morning group which was gratitude based and used a sensorimotor activity to increase structure and promote engagement. He identified several sources of gratitude and participated for about half the group. Then he chose to sit and observe but was still occasionally interjecting relevant input.

## 2019-12-26 NOTE — PROGRESS NOTES
"Patient has spent majority of the shift in the milieu. Does a lot of walking up and down the hallway. Patient continues to deny all mental health symptoms including SI and SIB, anxiety and depression and auditory and visual hallucinations. Looking forward to discharge next week. Ate dinner and snacks. Med compliant. Affect bright and social with peers and staff.     Patient evaluation continues. Assessed mood,anxiety,thoughts and behavior.     Patient gradually progressing towards goals.    Patient is encouraged to participate in groups and assisted to develop healthy coping skills.     VS reviewed: /53   Pulse 86   Temp 96.9  F (36.1  C) (Oral)   Resp 18   Ht 1.702 m (5' 7\")   Wt 75.3 kg (166 lb)   SpO2 94%   BMI 26.00 kg/m      Length of stay: 52    Refer to daily team meeting notes for individualized plan of care. Nursing will continue to assess.          "

## 2019-12-26 NOTE — PROGRESS NOTES
Hermann refused his vital signs this afternoon. RN notified.        12/26/19 0546   Vital Signs   Temp   (Refused)   Pulse   (Refused)   BP   (Refused)

## 2019-12-26 NOTE — PROGRESS NOTES
"[facility], [place]   Psychiatric Progress Note    Length of stay (days): 53        Interim History:   The patient's care was discussed with the treatment team during the daily team meeting and/or staff's chart notes were reviewed.  Staff report patient continues to be visible in the milieu, attending groups, taking medications as prescribed, reporting subjective improvement, voices readiness for discharge, team continue to work on discharge plan.    Patient reports that his mood is \"good\" and that he continues to feel ready for discharge.  He does feel that [facility] would be a better fit for him and he prefers that disposition plan.  He continues to tolerate his medications and denies any side effects.  He continues to have some grinding of his teeth and movement in his jaw and denies that this has changed or gotten worse.  He denies having any hallucinations or paranoia.  He continues to report that his thinking is more clear and it does not feel like his thoughts are as jumbled up.  Denies any safety concerns including thoughts of hurting self or others.  Denies having any physical health concerns at this time.         Medications:       LORazepam  1 mg Oral TID     metFORMIN  500 mg Oral BID w/meals     OLANZapine zydis  20 mg Oral At Bedtime    Or     OLANZapine  10 mg Intramuscular At Bedtime     omeprazole  20 mg Oral QAM AC     ramelteon  8 mg Oral At Bedtime     traZODone  200 mg Oral At Bedtime          Allergies:   No Known Allergies       Labs:   No results found for this or any previous visit (from the past 24 hour(s)).       Psychiatric Examination:     /53   Pulse 86   Temp 96.9  F (36.1  C) (Oral)   Resp 18   Ht 1.702 m (5' 7\")   Wt 75.3 kg (166 lb)   SpO2 94%   BMI 26.00 kg/m    Weight is 166 lbs 0 oz  Body mass index is 26 kg/m .  Orthostatic Vitals     None          Appearance: casually dressed and less disheveled, some grinding his teeth today " "during interview, which is chronic for him  Attitude:  still guarded but cooperative  Eye Contact:  fair  Mood:  \"good\"  Affect:  intensity is flat  Speech:  clear, coherent  Psychomotor Behavior:  no evidence of tardive dyskinesia, dystonia, or tics aside from jaw movement noted above, difficult to tell if this is EPSE as pt reports it is chronic  Thought Process:  linear  Associations:  no loose associations  Thought Content:  no evidence of suicidal ideation or homicidal ideation, no evidence of psychotic thought and patient denying AH/VH and paranoia  Insight:  limited  Judgement:  limited  Oriented to:  Person and place  Attention Span and Concentration:  fair  Recent and Remote Memory:  fair    Clinical Global Impressions  First:  Considering your total clinical experience with this particular patient population, how severe are the patient's symptoms at this time?: 7 (11/03/19 0907)  Compared to the patient's condition at the START of treatment, this patient's condition is:: 4 (11/03/19 0907)  Most recent:  Considering your total clinical experience with this particular patient population, how severe are the patient's symptoms at this time?: 7 (11/21/19 1524)  Compared to the patient's condition at the START of treatment, this patient's condition is:: 4 (11/21/19 1524)         Precautions:     Behavioral Orders   Procedures     Code 1 - Restrict to Unit     Routine Programming     As clinically indicated     Sexual precautions     Status 15     Every 15 minutes.          Diagnoses:     Schizophrenia, decompensated with catatonia symptoms         Plan:     Continue current med regimen without changes. Increased Zyprexa to 20 mg at bedtime on 12/2 to better address psychotic symptoms, which have since improved.  [facility] order is in place.     Added metformin 500 mg BID for neuroleptic induced wt gain.    Currently on a trial of Ativan to treat catatonic symptoms. Appears to be helping.     -PPD/Mantoux ordered " 12/26/19 per discharge planning    Psychosocial treatments to be addressed with social work consult and groups.    Legal Status:  Patient is Committed with [facility] in place.    Disposition: Pursuing intensive residential treatment services. MA has now been approved and is active. Patient's interview with Anton Kerns scheduled for 12/19 was cancelled and rescheduled for 12/23. Pt completed interview with [facility] 12/20. Patient accepted to both facilities, would prefer [facility], paper work completed today and medications ordered.     Patient has been seen and evaluated by me, Kelsea Salgado DO covering for Dr. Woods.     Kelsea Salgado DO   [facility] Psych[facility]

## 2019-12-27 PROCEDURE — 90853 GROUP PSYCHOTHERAPY: CPT

## 2019-12-27 PROCEDURE — 25000132 ZZH RX MED GY IP 250 OP 250 PS 637: Mod: GY | Performed by: PSYCHIATRY & NEUROLOGY

## 2019-12-27 PROCEDURE — 12400001 ZZH R&B MH UMMC

## 2019-12-27 PROCEDURE — 25000132 ZZH RX MED GY IP 250 OP 250 PS 637: Performed by: PSYCHIATRY & NEUROLOGY

## 2019-12-27 PROCEDURE — G0177 OPPS/PHP; TRAIN & EDUC SERV: HCPCS

## 2019-12-27 PROCEDURE — 99232 SBSQ HOSP IP/OBS MODERATE 35: CPT | Performed by: PSYCHIATRY & NEUROLOGY

## 2019-12-27 RX ADMIN — TRAZODONE HYDROCHLORIDE 200 MG: 100 TABLET ORAL at 20:00

## 2019-12-27 RX ADMIN — METFORMIN HYDROCHLORIDE 500 MG: 500 TABLET ORAL at 17:00

## 2019-12-27 RX ADMIN — LORAZEPAM 1 MG: 1 TABLET ORAL at 14:53

## 2019-12-27 RX ADMIN — OLANZAPINE 20 MG: 10 TABLET, ORALLY DISINTEGRATING ORAL at 20:00

## 2019-12-27 RX ADMIN — METFORMIN HYDROCHLORIDE 500 MG: 500 TABLET ORAL at 08:15

## 2019-12-27 RX ADMIN — OMEPRAZOLE 20 MG: 20 CAPSULE, DELAYED RELEASE ORAL at 08:15

## 2019-12-27 RX ADMIN — METFORMIN HYDROCHLORIDE 500 MG: 500 TABLET ORAL at 16:59

## 2019-12-27 RX ADMIN — RAMELTEON 8 MG: 8 TABLET, FILM COATED ORAL at 20:00

## 2019-12-27 RX ADMIN — LORAZEPAM 1 MG: 1 TABLET ORAL at 08:15

## 2019-12-27 RX ADMIN — LORAZEPAM 1 MG: 1 TABLET ORAL at 20:00

## 2019-12-27 ASSESSMENT — ACTIVITIES OF DAILY LIVING (ADL)
LAUNDRY: WITH SUPERVISION
HYGIENE/GROOMING: HANDWASHING;INDEPENDENT
ORAL_HYGIENE: INDEPENDENT
DRESS: SCRUBS (BEHAVIORAL HEALTH);INDEPENDENT

## 2019-12-27 NOTE — PROGRESS NOTES
"[facility], [place]   Psychiatric Progress Note    Length of stay (days): 54        Interim History:   The patient's care was discussed with the treatment team during the daily team meeting and/or staff's chart notes were reviewed.  Staff report patient has been mostly visible in the milieu, taking medications as prescribed, reporting readiness for discharge, no acute events overnight.     Patient reports that his mood is \"good\" and he continues to feel ready for discharge. Denies having any SI/HI. Denies AH/VH or paranoia. Reports his thoughts are more clear. Tolerating medication, denies having any side effects. Discussed the grinding of his teeth, he reports he is not aware when he is doing this but has done this since childhood. No physical health concerns. Eating and sleeping well. Understands care will be transferred to another physician Monday prior to discharge.          Medications:       [START ON 12/28/2019] - Skin Test Reading (tuberculin) -   Does not apply Once     LORazepam  1 mg Oral TID     metFORMIN  500 mg Oral BID w/meals     OLANZapine zydis  20 mg Oral At Bedtime    Or     OLANZapine  10 mg Intramuscular At Bedtime     omeprazole  20 mg Oral QAM AC     ramelteon  8 mg Oral At Bedtime     traZODone  200 mg Oral At Bedtime          Allergies:   No Known Allergies       Labs:   No results found for this or any previous visit (from the past 24 hour(s)).       Psychiatric Examination:     /53   Pulse 86   Temp 96.9  F (36.1  C) (Oral)   Resp 18   Ht 1.702 m (5' 7\")   Wt 75.3 kg (166 lb)   SpO2 94%   BMI 26.00 kg/m    Weight is 166 lbs 0 oz  Body mass index is 26 kg/m .  Orthostatic Vitals     None          Appearance: awake, alert, casually dressed and untidy  Attitude:  cooperative  Eye Contact:  fair  Mood:  \"good\"  Affect:  intensity is flat  Speech:  clear, coherent  Psychomotor Behavior:  no evidence of tardive dyskinesia, dystonia, or tics; was not " grinding teeth today, previously noted to have involuntary jaw movement/teeth grinding difficult to tell if this is EPSE as pt reports it is chronic  Thought Process:  linear  Associations:  no loose associations  Thought Content:  no evidence of suicidal ideation or homicidal ideation, no evidence of psychotic thought and patient denying AH/VH and paranoia  Insight:  limited  Judgement:  limited  Oriented to:  Person and place  Attention Span and Concentration:  fair  Recent and Remote Memory:  fair    Clinical Global Impressions  First:  Considering your total clinical experience with this particular patient population, how severe are the patient's symptoms at this time?: 7 (11/03/19 0907)  Compared to the patient's condition at the START of treatment, this patient's condition is:: 4 (11/03/19 0907)  Most recent:  Considering your total clinical experience with this particular patient population, how severe are the patient's symptoms at this time?: 7 (11/21/19 1524)  Compared to the patient's condition at the START of treatment, this patient's condition is:: 4 (11/21/19 1524)         Precautions:     Behavioral Orders   Procedures     Code 1 - Restrict to Unit     Routine Programming     As clinically indicated     Sexual precautions     Status 15     Every 15 minutes.          Diagnoses:     Schizophrenia, decompensated with catatonia symptoms         Plan:     Continue current med regimen without changes. Increased Zyprexa to 20 mg at bedtime on 12/2 to better address psychotic symptoms, which have since improved.  [facility] order is in place.     Added metformin 500 mg BID for neuroleptic induced wt gain.    Currently on a trial of Ativan to treat catatonic symptoms. Appears to be helping.     -PPD/Mantoux given 12/26/19, due to be read 12/28, see MAR    Psychosocial treatments to be addressed with social work consult and groups.    Legal Status:  Patient is Committed with [facility] in place.    Disposition:  MA has  now been approved and is active. Pt completed interview with [facility] and [facility]. Patient accepted to both facilities, would prefer [facility], paper work completed 12/26/19 and medications ordered.     Patient has been seen and evaluated by me, Kelsea Salgado DO.  Patient will be transferred to Dr. Cordova starting 12/28/19.    Kelsea Salgado DO   [facility] Psychiatry

## 2019-12-27 NOTE — PROGRESS NOTES
Participated in Music Therapy group with focus on mood elevation, validation and decreasing anxiety and improved group cohesiveness. Engaged and cooperative in music listening interventions.      Hermann chose a hard rock song.  Writer was aware of Hermann staring at her during group.  However, he was calm and pleasant, agreeable during group.

## 2019-12-27 NOTE — PLAN OF CARE
Pt spent majority of the shift in the milieu, attended and participated in groups.  Pt denies auditory and visual hallucination.  Pt reports sleep and appetite as being good and has no concerns.  Reports being ready for discharge next week.  Will continue to assess.

## 2019-12-27 NOTE — DISCHARGE INSTRUCTIONS
Behavioral Discharge Planning and Instructions      Summary:  You were admitted on 11/2/2019  due to Psychotic Symptomology, Disorganized Behavior.  You were treated by Dr. Percy Cordova MD, Dr. Lea Woods MD and Dr. Kelsea Salgado MD.  You arrived displaying symtpoms of catatonoia which has since dissipated. A petition for commitment was filed and supported while you were here, along with an order for a Redman to ensure that you are taking your medication.      You took medications prescribed by your doctor while in the hospital and as a result, your symptoms improved.  You were discharged on 12/30/2019 from [facility] to [facility], a mental health residential treatment center.    [facility] and you are being discharged on a Provisional Discharge Agreement which shall remain in effect for the duration of the Commitment which expires on 05/15/2020.   You were also court ordered to take the medications that the doctor ordered for you.            Principal Diagnosis:   Schizophrenia  ,  Health Care Follow-up Appointments:     You became active with medical assistance while you were here.You have access to transportation services to your healthcare appointments via your insurance coverage. The number to schedule a ride is:  [employer] - - [phone] or the nurse line if need special accommodations [phone]    You are being discharged on a provisional discharge to:    [facility]  [address] [place] [address]  Attention: Nathalie Du  [phone]  The Health Unit Coordinator has faxed these discharge instructions to Fax: [phone]        Continue to coordinate your care with your Mental Health Targeted : Mildred Green @ [facility].  P: [phone]  The Health Unit Coordinator has faxed these discharge instructions to fax:  [phone]        You have a new medication follow up appointment at the [facility]  You will be completing an intake as a new patient and you will also be meeting with someone from the financial office to go over your insurance information.     Date/Time: 1/23/2020 9:00am, arrive at 8:45am     Provider: Beatriz DELAROSA () Abe Lozoya APRN-CNP (medication management)   Address: [address], [place] [address]  Phone: [phone]  Fax: [phone]        [facility] : Whit Tong, [phone]    Phone:[phone]    Fax: [phone]          Attend all scheduled appointments with your outpatient providers. Call at least 24 hours in advance if you need to reschedule an appointment to ensure continued access to your outpatient providers.   Major Treatments, Procedures and Findings:  You were provided with: a psychiatric assessment, assessed for medical stability, medication evaluation and/or management, group therapy and milieu management.     Symptoms to Report: feeling more aggressive, increased confusion, losing more sleep, mood getting worse or thoughts of suicide    Early warning signs can include: increased depression or anxiety, sleep disturbances, increased thoughts or behaviors of suicide or self-harm  increased unusual thinking, such as paranoia or hearing voices    Safety and Wellness:  Take all medicines as directed.  Make no changes unless your doctor suggests them. Follow treatment recommendations.  Refrain from alcohol and non-prescribed drugs.  If there is a concern for safety, call [phone].    Resources:     Crisis services are available 24/7 if you are having a mental health crisis.    COPE (Community Outreach for Psychiatric Emergencies): [phone]    In the [facility], call **CRISIS (411640) from a cell phone to talk to a team of professionals who can help you.    Crisis Text Line: Text [place] to  330445    These are  crisis residences where you can stay for a short time if you feel like you need to stabilize but do not need to go to the hospital.    [facility]  - Crisis Beds  1.[facility] Crisis Stabilization Program  [address] [place] [address]  Phone:[phone]    2.[facility]  [address] [place] [address]  Phone: [phone]    3.[facility]  [address] [place] [address]  Phone: [phone]      If you ever need immediate access to services, [facility] has a walk in triage services to help you with what you need. This Triage Center is located in Room N141 at [address]. Go through the front door of [address], and follow signs to [address].   Triage hours:10:00 am - 5:00 pm  Triage Phone Number: [phone]      The treatment team has appreciated the opportunity to work with you.     If you have any questions or concerns our unit number is [phone].  If you have the need for records regarding this hospitalization please contact the medical records department at [phone].

## 2019-12-27 NOTE — PLAN OF CARE
Patient participated in 2 of 3 OT groups. He declined to work on a project in OT clinic, although contemplated his design. He has completed a project for his daughter and demonstrated intentional planning of design and attention to detail for execution. He sat and observed what others were completing.   He was engaged in mindfulness task based group, using a structured group activity to promote engagement, fun, and group cohesion. He required cues for tracking and rule following. He use crude humor for laughs but maintained boundaries while in group. He learned the rules quickly and retained them through group. He was silly and lifted the mood for other patients, getting others to laugh.

## 2019-12-27 NOTE — PROGRESS NOTES
Hermann was pacing the hallway most of the shift aside from a nap he took after dinner. He denied having any suicidal thoughts, hallucinations or depression. Stating he feels good and is just killing time before he goes home on Monday. He told the writer that he is not anxious about discharge and feels more than ready to leave. He had no visitors.        12/26/19 2000   Behavioral Health   Hallucinations denies / not responding to hallucinations   Thinking intact   Orientation person: oriented;place: oriented;date: oriented;time: oriented   Memory baseline memory   Insight poor   Judgement intact   Eye Contact at examiner   Affect full range affect   Mood mood is calm   Physical Appearance/Attire disheveled   Hygiene other (see comment)  (fair)   Suicidality   (Denies)   1. Wish to be Dead (Recent) No   2. Non-Specific Active Suicidal Thoughts (Recent) No   Self Injury   (Denies)   Elopement   (None indicated)   Activity restless   Speech clear;coherent   Medication Sensitivity no stated side effects;no observed side effects   Psychomotor / Gait balanced;steady

## 2019-12-27 NOTE — PROGRESS NOTES
SHILPI met briefly with Hermann to have him sign an BRAYAN for [facility]. SHILPI explained the referral for outpatient medication management and the appointment scheduled on 1/23/20.

## 2019-12-27 NOTE — PROGRESS NOTES
Hermann refused his vital signs this afternoon. RN notified.        12/27/19 0524   Vital Signs   Temp   (Refused)   Pulse   (Refused)   BP   (Refused)

## 2019-12-28 PROCEDURE — 12400001 ZZH R&B MH UMMC

## 2019-12-28 PROCEDURE — H2032 ACTIVITY THERAPY, PER 15 MIN: HCPCS

## 2019-12-28 PROCEDURE — 25000132 ZZH RX MED GY IP 250 OP 250 PS 637: Mod: GY | Performed by: PSYCHIATRY & NEUROLOGY

## 2019-12-28 PROCEDURE — 25000132 ZZH RX MED GY IP 250 OP 250 PS 637: Performed by: PSYCHIATRY & NEUROLOGY

## 2019-12-28 RX ADMIN — RAMELTEON 8 MG: 8 TABLET, FILM COATED ORAL at 21:05

## 2019-12-28 RX ADMIN — OMEPRAZOLE 20 MG: 20 CAPSULE, DELAYED RELEASE ORAL at 09:22

## 2019-12-28 RX ADMIN — OLANZAPINE 20 MG: 10 TABLET, ORALLY DISINTEGRATING ORAL at 21:05

## 2019-12-28 RX ADMIN — LORAZEPAM 1 MG: 1 TABLET ORAL at 09:22

## 2019-12-28 RX ADMIN — TRAZODONE HYDROCHLORIDE 200 MG: 100 TABLET ORAL at 21:05

## 2019-12-28 RX ADMIN — METFORMIN HYDROCHLORIDE 500 MG: 500 TABLET ORAL at 09:22

## 2019-12-28 RX ADMIN — LORAZEPAM 1 MG: 1 TABLET ORAL at 14:22

## 2019-12-28 RX ADMIN — LORAZEPAM 1 MG: 1 TABLET ORAL at 21:05

## 2019-12-28 RX ADMIN — METFORMIN HYDROCHLORIDE 500 MG: 500 TABLET ORAL at 18:36

## 2019-12-28 ASSESSMENT — ACTIVITIES OF DAILY LIVING (ADL)
HYGIENE/GROOMING: INDEPENDENT
DRESS: INDEPENDENT
LAUNDRY: WITH SUPERVISION
LAUNDRY: WITH SUPERVISION
ORAL_HYGIENE: INDEPENDENT
HYGIENE/GROOMING: INDEPENDENT
ORAL_HYGIENE: INDEPENDENT
DRESS: STREET CLOTHES;SCRUBS (BEHAVIORAL HEALTH)

## 2019-12-28 NOTE — PROGRESS NOTES
Hermann spent the majority of the evening in the common areas either watching television or pacing the hallway.  He appears less distracted, less disorganized in thoughts and actively participated in Community Meeting.  This evening he was observed pacing the hallway, but would only walk from the lounge by the television to the door to the nursing station and back again, he did this for at least an hour.  He currently denies paranoid thinking, auditory and visual hallucinations.  He appears bored, oftentimes makes attempts to use humor to initiate conversation with peers and staff.  Overall he states he feels hopeful and looks forward to discharge soon.  He denies SI and SIB urges/thoughts.        12/27/19 2136   Sleep/Rest/Relaxation   Day/Evening Time Hours up all shift   Behavioral Health   Hallucinations denies / not responding to hallucinations   Thinking intact   Orientation person: oriented;place: oriented   Memory baseline memory   Insight insight appropriate to situation   Judgement impaired   Eye Contact at examiner   Affect full range affect   Mood mood is calm   Physical Appearance/Attire appears stated age;attire appropriate to age and situation   Hygiene other (see comment)  (Fair)   Suicidality other (see comments)  (Currently denies SI.)   1. Wish to be Dead (Recent) No   2. Non-Specific Active Suicidal Thoughts (Recent) No   Self Injury other (see comment)  (Currently denies SIB urges/thoughts. )   Elopement   (No concernable statements or behaviors observed. )   Activity restless;other (see comment)  (Pacing the hallway or watching television. )   Speech coherent   Medication Sensitivity no stated side effects;no observed side effects   Psychomotor / Gait paces;balanced;steady   Overt Aggression Scale   Verbal Aggression 0   Aggression against Property 0   Auto-Aggression 0   Physical Aggression 0   Overt Aggression Total Score 0   Coping/Psychosocial Interventions   Supportive Measures active  listening utilized;goal setting facilitated;relaxation techniques promoted;verbalization of feelings encouraged   Psycho Education   Type of Intervention 1:1 intervention   Response participates, initiates socially appropriate   Hours 0.5   Group Therapy Session   Group Attendance attended group session   Group Type community   Group Topic Covered structured socialization   Patient Participation/Contribution discussed personal experience with topic;expressed understanding of topic;listened actively   Activities of Daily Living   Hygiene/Grooming handwashing;independent   Oral Hygiene independent   Dress scrubs (behavioral health);independent   Laundry with supervision   Room Organization independent   Activity   Activity Assistance Provided independent   Hygiene Care Assistance   Hygiene Assistance independent   Groups   Details Community Meeting  (Attended with participation. )

## 2019-12-28 NOTE — PROGRESS NOTES
12/28/19 0817   Vital Signs   Temp   (refused)   Resp   (refused)   Pulse   (refused)   BP   (refused)

## 2019-12-28 NOTE — PROGRESS NOTES
Hermann had typical/uneventful shift. He was calm and cooperative with full range affect. He was visibile in milieu watching tv, socializing with select peers and staff members or pacing in hallway. He denied all mental health symptoms including SI/SIB ideation. His appetite was good and sleeping/napping well.        12/28/19 1356   Behavioral Health   Hallucinations denies / not responding to hallucinations   Thinking intact   Orientation time: oriented;date: oriented;place: oriented   Memory baseline memory   Insight insight appropriate to situation   Judgement impaired   Eye Contact at examiner   Affect full range affect   Mood mood is calm   Physical Appearance/Attire attire appropriate to age and situation   Hygiene neglected grooming - unclean body, hair, teeth   Suicidality   (denied )   1. Wish to be Dead (Recent) No   2. Non-Specific Active Suicidal Thoughts (Recent) No   Self Injury   (denied )   Elopement   (denied )   Activity withdrawn;isolative   Speech coherent;clear   Medication Sensitivity no stated side effects   Psychomotor / Gait steady;balanced   Psycho Education   Type of Intervention 1:1 intervention   Response participates, initiates socially appropriate   Hours 0.5   Activities of Daily Living   Hygiene/Grooming independent   Oral Hygiene independent   Dress street clothes;scrubs (behavioral health)   Laundry with supervision   Room Organization independent

## 2019-12-28 NOTE — PROGRESS NOTES
Hermann refused his vital signs this afternoon. RN notified.        12/28/19 1610   Vital Signs   Temp   (Refused)   Pulse   (Refused)   BP   (Refused)

## 2019-12-28 NOTE — PROGRESS NOTES
"   12/27/19 1700   Group Therapy Session   Group Attendance attended group session   Total Time (minutes) 35   Group Type psychotherapeutic   Group Topic Covered other (see comments)   Patient Participation/Contribution cooperative with task;discussed personal experience with topic   CBT activity was utilized to help individuals identify and process a problem while considering various coping strategies.  Hermann reported feeling \"pretty good.\" He participated in the activity and processed his response. He demonstrated little social interaction.  "

## 2019-12-29 PROCEDURE — 25000132 ZZH RX MED GY IP 250 OP 250 PS 637: Performed by: PSYCHIATRY & NEUROLOGY

## 2019-12-29 PROCEDURE — H2032 ACTIVITY THERAPY, PER 15 MIN: HCPCS

## 2019-12-29 PROCEDURE — 12400001 ZZH R&B MH UMMC

## 2019-12-29 RX ADMIN — OMEPRAZOLE 20 MG: 20 CAPSULE, DELAYED RELEASE ORAL at 08:15

## 2019-12-29 RX ADMIN — TRAZODONE HYDROCHLORIDE 200 MG: 100 TABLET ORAL at 20:18

## 2019-12-29 RX ADMIN — LORAZEPAM 1 MG: 1 TABLET ORAL at 14:20

## 2019-12-29 RX ADMIN — LORAZEPAM 1 MG: 1 TABLET ORAL at 08:15

## 2019-12-29 RX ADMIN — RAMELTEON 8 MG: 8 TABLET, FILM COATED ORAL at 20:18

## 2019-12-29 RX ADMIN — METFORMIN HYDROCHLORIDE 500 MG: 500 TABLET ORAL at 08:15

## 2019-12-29 RX ADMIN — OLANZAPINE 20 MG: 10 TABLET, ORALLY DISINTEGRATING ORAL at 20:18

## 2019-12-29 RX ADMIN — METFORMIN HYDROCHLORIDE 500 MG: 500 TABLET ORAL at 18:15

## 2019-12-29 RX ADMIN — LORAZEPAM 1 MG: 1 TABLET ORAL at 20:18

## 2019-12-29 NOTE — PROGRESS NOTES
"Patient has spent majority of the evening in the milieu. Did some pacing in the hallway and was social with peers and staff. Patient was talkative and talking about some of the plans he had for himself after discharge. Denies SI and SIB. Denies anxiety and depression. Denies auditory and visual hallucinations.     Patient evaluation continues. Assessed mood,anxiety,thoughts and behavior.     Patient gradually progressing towards goals.    Patient is encouraged to participate in groups and assisted to develop healthy coping skills.     VS reviewed: /53   Pulse 86   Temp 96.9  F (36.1  C) (Oral)   Resp 18   Ht 1.702 m (5' 7\")   Wt 75.3 kg (166 lb)   SpO2 94%   BMI 26.00 kg/m      Length of stay: 55    Refer to daily team meeting notes for individualized plan of care. Nursing will continue to assess.      "

## 2019-12-29 NOTE — PROGRESS NOTES
12/28/19 1800   General Information   Art Directive other (see comments)   AT directive is to create a drawing/writing of a personal intention for the new year using a watercolor/oil pastel relief technique. Goals of directive: mindfulness, identifying personal strengths and goals, intention setting for the new year. Pt finished painting and briefly shared with group. Pt chose the intention of peace. Pt was somewhat tangential and began talking about the wealthy people of [place], the government, funding space exploration, the future of the workforce etc.    Pt was in a good mood and did contribute positively to group discussion.

## 2019-12-29 NOTE — PROGRESS NOTES
Hermann refused his vital signs this afternoon. RN notified.        12/29/19 1601   Vital Signs   Temp   (Refused)   Pulse   (Refused)   BP   (Refused)

## 2019-12-29 NOTE — PROGRESS NOTES
12/29/19 1400   Behavioral Health   Hallucinations denies / not responding to hallucinations   Thinking intact   Orientation person: oriented;place: oriented;date: oriented;time: oriented   Memory baseline memory   Judgement intact   Affect full range affect   Mood anxious  (Excited anticipating D/C tommorrow.)   Physical Appearance/Attire untidy   Suicidality other (see comments)  (Denies.)   1. Wish to be Dead (Recent) No   2. Non-Specific Active Suicidal Thoughts (Recent) No   Activity restless   Speech clear;coherent   Medication Sensitivity no stated side effects;no observed side effects   Psychomotor / Gait balanced;steady

## 2019-12-30 VITALS
HEART RATE: 86 BPM | WEIGHT: 166 LBS | DIASTOLIC BLOOD PRESSURE: 53 MMHG | TEMPERATURE: 96.9 F | BODY MASS INDEX: 26.06 KG/M2 | HEIGHT: 67 IN | SYSTOLIC BLOOD PRESSURE: 103 MMHG | RESPIRATION RATE: 18 BRPM | OXYGEN SATURATION: 94 %

## 2019-12-30 PROCEDURE — 25000132 ZZH RX MED GY IP 250 OP 250 PS 637: Performed by: PSYCHIATRY & NEUROLOGY

## 2019-12-30 PROCEDURE — 99239 HOSP IP/OBS DSCHRG MGMT >30: CPT | Performed by: PSYCHIATRY & NEUROLOGY

## 2019-12-30 RX ADMIN — METFORMIN HYDROCHLORIDE 500 MG: 500 TABLET ORAL at 07:44

## 2019-12-30 RX ADMIN — LORAZEPAM 1 MG: 1 TABLET ORAL at 07:44

## 2019-12-30 RX ADMIN — OMEPRAZOLE 20 MG: 20 CAPSULE, DELAYED RELEASE ORAL at 07:44

## 2019-12-30 NOTE — PLAN OF CARE
"  Hermann had a good evening, interacting with peers and staff. He watched tv in the lounge, attended community meeting, and generally was a positve presence on the unit. He did have a few odd behaviors, such as knocking on the med room door while waiting for meds, while this writer was finishing the previous documentation. When this writer explained I was finishing up documentation, he stated; \"just joking\".     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 I explained to him I thought it would be a good fit, with good food and an exercise room available. Hermann was excited about the potential of the work program through [facility], and is hopefull about working. Denies SI, SIB, HI, A/V hallucinations.ADL's could use a little tuning up, but there will be groups on that type of thing at [place] as well, so it will be a good fit In that way as well. Plan is for discharge in the am to [facility].  "

## 2019-12-30 NOTE — PROGRESS NOTES
" 12/29/19 2200   Art Therapy   Type of Intervention structured groups   Response Participated with encouragement   Hours 1   Treatment Detail    (Art Therapy - Intention mandalas)   Goal- cope, express, regulate and reflect through Art Therapy directives     Outcome-  Pt attended group.He did a thoughtful project about \" beauty and love\" with a lot of hearts. He said his intention in 2020 is to \" invent something.\" He was pleasant and cooperative.              "

## 2019-12-30 NOTE — PLAN OF CARE
Patient  is being discharged today to [facility]. He denies feeling suicidal or any thoughts of self harm. He states feeling excited for discharge. He is being discharged with a 30 day of medication and a discharge plan in place.

## 2019-12-30 NOTE — DISCHARGE SUMMARY
[facility]  Department of Psychiatry    DATE OF ADMISSION:  11/2/2019    DATE OF DISCHARGE:  December 30, 2019    DISCHARGE DIAGNOSES:   Schizophrenia    HOSPITAL COURSE: (Refer to H&P, progress notes, and consult notes for details)    The patient was admitted to our Behavioral Health Unit under a 72-hour hold for a decompensated state related to symptoms of his mental illness.  Records indicate that there was a prominent presence of psychosis giving rise to disorganized behaviors which raised concern for his well-being and ability to properly care for himself.  He was initially evaluated by Dr. Bullard who initiated treatment with Zyprexa to address psychotic symptoms.  The patient's care was later transitioned to Dr. Nieto we will maintained the majority of the patient's care.  A petition for commitment was pursued eventually resulting in court ordered treatment for mental illness with a Redman order.  Medication treatment involved optimizing Zyprexa while adding Ativan to address suspected catatonia.  He responded well to these medications and gained improvement in targeted symptoms.  The remainder of his treatment course involved awaiting reduction of residual symptoms while exploring residential treatment options.  Plans were made to transition his care to [facility].  I was providing cross coverage during Dr. Wodos's absence during which time a bed became available for the patient.  His care was then transitioned directly to that treatment facility as planned.    Mood and anxiety-related symptoms had improved by the time of discharge and the patient denied suicidal and homicidal thoughts, plans, or intent.  Treatment team felt the patient was stable and ready for discharge.    No allergies or severe adverse reactions to the medications were noted.    Other interventions received during his hospitalization included:   Psychosocial treatments were addressed with  groups, social work consult, and supportive milieu provided by staff.    CONDITION AT DISCHARGE:  Improved.  The patients acute suicide risk is low due to the following factors:  improved mood/anxiety symptoms.  Denies suicidal ideations. Denies psychotic symptoms.  Not actively intoxicated and plans to abstain from illicit substances and alcohol.  Denies access to guns.  Denies feeling hopeless or helpless. At the time of discharge Hermann Arita was determined to not be an immediate danger to herself or others. The patient's acute risk will be higher if noncompliant with treatment plan, medications, follow-up or using illicit substances or alcohol.  These findings along with the risks of noncompliance with medications and treatment plan, which could potentially cause decompensation and increase the risk for suicide, were discussed with the patient.  The patients chronic suicide risk is moderate given the following factors: white race; diagnosis of Schizophrenia, unemployed; homeless; Denied a family history of suicide.  Preventative factors include: social supports     MENTAL STATUS EXAMINATION AT TIME OF DISCHARGE:  The patient is 34 year old White male who appears their stated age and is appropriately dressed with good hygiene.  Calm and cooperative with the interview questions.  No psychomotor abnormalities are noted. Eye contact is appropriate. Speech has normal rate, tone, latency and volume and is not pushed or pressured. Mood is euthymic and affect is full and appropriate.  The patient does not seem overtly depressed, anxious, manic or irritable.  Thought process is linear, logical and future oriented.  Thought process is not tangential, circumstantial or disorganized.  Thought content is not significant for apperant paranoia, delusions, ideas of reference or grandiosity.  The patient denies suicidal and homicidal ideations as well as auditory and visual hallucinations.  Insight and judgment are fair.   Cognition appears intact to interviewing including orientation, recent and remote memory, fund of knowledge, use of language, attention span and concentration.  Muscle strength, tone and gait appear normal on visual inspection.      DISPOSITION:  The patient is discharged to the [facility] for residential mental health treatment while under involuntary commitment for treatment of mental illness, now under a provisional discharge status.    FOLLOWUP APPOINTMENTS:  ( per social workers notes and after visit summary)  Continue to coordinate your care with your Mental Health Targeted : Mildred Green @ [facility].  P: [phone]  The Health Unit Coordinator has faxed these discharge instructions to fax: [phone]     [facility]  You will be completing an intake as a new patient and you will also be meeting with someone from the financial office to go over your insurance information.      Date/Time: 1/23/2020 9:00am, arrive at 8:45am     Provider: Beatriz DELAROSA () Abe DAMON (medication management)   Address: [address], [place] [address]  Phone: [phone]  Fax: [phone]       DISCHARGE MEDICATIONS:   Discharge Medication List as of 12/30/2019  8:57 AM      CONTINUE these medications which have CHANGED    Details   LORazepam (ATIVAN) 1 MG tablet Take 1 tablet (1 mg) by mouth 3 times daily, Disp-3 tablet, R-0, Local Print      !! metFORMIN (GLUCOPHAGE) 500 MG tablet Take 1 tablet (500 mg) by mouth 2 times daily (with meals), Disp-2 tablet, R-0, E-Prescribe      !! metFORMIN (GLUCOPHAGE) 500 MG tablet Take 1 tablet (500 mg) by mouth 2 times daily (with meals), Disp-60 tablet, R-0, Local Print      !! OLANZapine zydis (ZYPREXA) 20 MG ODT Take 1 tablet (20 mg) by mouth At Bedtime, Disp-1 tablet, R-0, E-Prescribe      !! OLANZapine zydis (ZYPREXA) 20 MG ODT Take 1  tablet (20 mg) by mouth At Bedtime, Disp-30 tablet, R-0, Local Print      !! omeprazole (PRILOSEC) 20 MG DR capsule Take 1 capsule (20 mg) by mouth every morning (before breakfast), Disp-1 capsule, R-0, E-Prescribe      !! omeprazole (PRILOSEC) 20 MG DR capsule Take 1 capsule (20 mg) by mouth daily, Disp-30 capsule, R-0, Local Print      !! ramelteon (ROZEREM) 8 MG tablet Take 1 tablet (8 mg) by mouth At Bedtime, Disp-1 tablet, R-0, E-Prescribe      !! ramelteon (ROZEREM) 8 MG tablet Take 1 tablet (8 mg) by mouth At Bedtime, Disp-30 tablet, R-0, Local Print      !! traZODone (DESYREL) 100 MG tablet Take 2 tablets (200 mg) by mouth At Bedtime, Disp-2 tablet, R-0, E-Prescribe      !! traZODone (DESYREL) 100 MG tablet Take 2 tablets (200 mg) by mouth At Bedtime, Disp-60 tablet, R-0, Local Print       !! - Potential duplicate medications found. Please discuss with provider.           LABORATORY RESULTS: (past 14 days)  No results found for this or any previous visit (from the past 336 hour(s)).    >30 minutes was spent on this discharge to allow for reviewing the patient's response to treatment, reviewing plan of care, education on medications and diagnosis, and conducting a risk assessment.

## 2019-12-31 RX ORDER — LORAZEPAM 1 MG/1
1 TABLET ORAL 3 TIMES DAILY
Qty: 90 TABLET | Refills: 0 | Status: ON HOLD | OUTPATIENT
Start: 2019-12-31 | End: 2020-02-13

## 2020-01-28 ENCOUNTER — HOSPITAL ENCOUNTER (INPATIENT)
Facility: CLINIC | Age: 35
LOS: 21 days | Discharge: PSYCHIATRIC OR SUBSTANCE USE FACILITY | DRG: 885 | End: 2020-02-18
Attending: EMERGENCY MEDICINE | Admitting: PSYCHIATRY & NEUROLOGY
Payer: MEDICARE

## 2020-01-28 ENCOUNTER — TELEPHONE (OUTPATIENT)
Dept: BEHAVIORAL HEALTH | Facility: CLINIC | Age: 35
End: 2020-01-28

## 2020-01-28 DIAGNOSIS — F06.1 CATATONIA: ICD-10-CM

## 2020-01-28 DIAGNOSIS — K59.00 CONSTIPATION, UNSPECIFIED CONSTIPATION TYPE: Primary | ICD-10-CM

## 2020-01-28 DIAGNOSIS — F29 PSYCHOSIS, UNSPECIFIED PSYCHOSIS TYPE (H): ICD-10-CM

## 2020-01-28 DIAGNOSIS — R52 PAIN: ICD-10-CM

## 2020-01-28 DIAGNOSIS — Z59.00 HOMELESS: ICD-10-CM

## 2020-01-28 DIAGNOSIS — F20.0 PARANOID SCHIZOPHRENIA (H): ICD-10-CM

## 2020-01-28 LAB
ALBUMIN UR-MCNC: 10 MG/DL
AMPHETAMINES UR QL SCN: NEGATIVE
ANION GAP SERPL CALCULATED.3IONS-SCNC: 1 MMOL/L (ref 3–14)
APPEARANCE UR: CLEAR
BARBITURATES UR QL: NEGATIVE
BASOPHILS # BLD AUTO: 0 10E9/L (ref 0–0.2)
BASOPHILS NFR BLD AUTO: 0.3 %
BENZODIAZ UR QL: NEGATIVE
BILIRUB UR QL STRIP: NEGATIVE
BUN SERPL-MCNC: 8 MG/DL (ref 7–30)
CALCIUM SERPL-MCNC: 8.9 MG/DL (ref 8.5–10.1)
CANNABINOIDS UR QL SCN: POSITIVE
CHLORIDE SERPL-SCNC: 109 MMOL/L (ref 94–109)
CO2 SERPL-SCNC: 31 MMOL/L (ref 20–32)
COCAINE UR QL: NEGATIVE
COLOR UR AUTO: YELLOW
CREAT SERPL-MCNC: 0.96 MG/DL (ref 0.66–1.25)
DIFFERENTIAL METHOD BLD: NORMAL
EOSINOPHIL # BLD AUTO: 0.3 10E9/L (ref 0–0.7)
EOSINOPHIL NFR BLD AUTO: 2.6 %
ERYTHROCYTE [DISTWIDTH] IN BLOOD BY AUTOMATED COUNT: 12.1 % (ref 10–15)
GFR SERPL CREATININE-BSD FRML MDRD: >90 ML/MIN/{1.73_M2}
GLUCOSE BLDC GLUCOMTR-MCNC: 73 MG/DL (ref 70–99)
GLUCOSE SERPL-MCNC: 88 MG/DL (ref 70–99)
GLUCOSE UR STRIP-MCNC: NEGATIVE MG/DL
HCT VFR BLD AUTO: 42.5 % (ref 40–53)
HGB BLD-MCNC: 14 G/DL (ref 13.3–17.7)
HGB UR QL STRIP: NEGATIVE
IMM GRANULOCYTES # BLD: 0 10E9/L (ref 0–0.4)
IMM GRANULOCYTES NFR BLD: 0.1 %
KETONES UR STRIP-MCNC: NEGATIVE MG/DL
LEUKOCYTE ESTERASE UR QL STRIP: NEGATIVE
LYMPHOCYTES # BLD AUTO: 2 10E9/L (ref 0.8–5.3)
LYMPHOCYTES NFR BLD AUTO: 20.4 %
MCH RBC QN AUTO: 30.2 PG (ref 26.5–33)
MCHC RBC AUTO-ENTMCNC: 32.9 G/DL (ref 31.5–36.5)
MCV RBC AUTO: 92 FL (ref 78–100)
MONOCYTES # BLD AUTO: 0.8 10E9/L (ref 0–1.3)
MONOCYTES NFR BLD AUTO: 8.2 %
MUCOUS THREADS #/AREA URNS LPF: PRESENT /LPF
NEUTROPHILS # BLD AUTO: 6.8 10E9/L (ref 1.6–8.3)
NEUTROPHILS NFR BLD AUTO: 68.4 %
NITRATE UR QL: NEGATIVE
OPIATES UR QL SCN: NEGATIVE
PCP UR QL SCN: NEGATIVE
PH UR STRIP: 6 PH (ref 5–7)
PLATELET # BLD AUTO: 280 10E9/L (ref 150–450)
POTASSIUM SERPL-SCNC: 4 MMOL/L (ref 3.4–5.3)
RBC # BLD AUTO: 4.64 10E12/L (ref 4.4–5.9)
RBC #/AREA URNS AUTO: 0 /HPF (ref 0–2)
SODIUM SERPL-SCNC: 141 MMOL/L (ref 133–144)
SOURCE: ABNORMAL
SP GR UR STRIP: 1.02 (ref 1–1.03)
TSH SERPL DL<=0.005 MIU/L-ACNC: 0.81 MU/L (ref 0.4–4)
UROBILINOGEN UR STRIP-MCNC: 2 MG/DL (ref 0–2)
WBC # BLD AUTO: 9.9 10E9/L (ref 4–11)
WBC #/AREA URNS AUTO: 1 /HPF (ref 0–5)

## 2020-01-28 PROCEDURE — 85025 COMPLETE CBC W/AUTO DIFF WBC: CPT | Performed by: PSYCHIATRY & NEUROLOGY

## 2020-01-28 PROCEDURE — 99285 EMERGENCY DEPT VISIT HI MDM: CPT | Mod: 25

## 2020-01-28 PROCEDURE — 81001 URINALYSIS AUTO W/SCOPE: CPT | Performed by: EMERGENCY MEDICINE

## 2020-01-28 PROCEDURE — 36415 COLL VENOUS BLD VENIPUNCTURE: CPT | Performed by: PSYCHIATRY & NEUROLOGY

## 2020-01-28 PROCEDURE — 25000132 ZZH RX MED GY IP 250 OP 250 PS 637: Performed by: PSYCHIATRY & NEUROLOGY

## 2020-01-28 PROCEDURE — 90791 PSYCH DIAGNOSTIC EVALUATION: CPT

## 2020-01-28 PROCEDURE — 80048 BASIC METABOLIC PNL TOTAL CA: CPT | Performed by: PSYCHIATRY & NEUROLOGY

## 2020-01-28 PROCEDURE — 80307 DRUG TEST PRSMV CHEM ANLYZR: CPT | Performed by: EMERGENCY MEDICINE

## 2020-01-28 PROCEDURE — 12400000 ZZH R&B MH

## 2020-01-28 PROCEDURE — 84443 ASSAY THYROID STIM HORMONE: CPT | Performed by: PSYCHIATRY & NEUROLOGY

## 2020-01-28 PROCEDURE — 00000146 ZZHCL STATISTIC GLUCOSE BY METER IP

## 2020-01-28 PROCEDURE — 83036 HEMOGLOBIN GLYCOSYLATED A1C: CPT | Performed by: PSYCHIATRY & NEUROLOGY

## 2020-01-28 PROCEDURE — 25000132 ZZH RX MED GY IP 250 OP 250 PS 637: Performed by: EMERGENCY MEDICINE

## 2020-01-28 RX ORDER — HYDROXYZINE HYDROCHLORIDE 25 MG/1
25 TABLET, FILM COATED ORAL EVERY 4 HOURS PRN
Status: DISCONTINUED | OUTPATIENT
Start: 2020-01-28 | End: 2020-02-18 | Stop reason: HOSPADM

## 2020-01-28 RX ORDER — BISACODYL 10 MG
10 SUPPOSITORY, RECTAL RECTAL DAILY PRN
Status: DISCONTINUED | OUTPATIENT
Start: 2020-01-28 | End: 2020-02-18 | Stop reason: HOSPADM

## 2020-01-28 RX ORDER — TRAZODONE HYDROCHLORIDE 50 MG/1
50 TABLET, FILM COATED ORAL
Status: DISCONTINUED | OUTPATIENT
Start: 2020-01-28 | End: 2020-02-18 | Stop reason: HOSPADM

## 2020-01-28 RX ORDER — OLANZAPINE 10 MG/2ML
10 INJECTION, POWDER, FOR SOLUTION INTRAMUSCULAR
Status: DISCONTINUED | OUTPATIENT
Start: 2020-01-28 | End: 2020-02-18 | Stop reason: HOSPADM

## 2020-01-28 RX ORDER — ACETAMINOPHEN 325 MG/1
650 TABLET ORAL EVERY 4 HOURS PRN
Status: DISCONTINUED | OUTPATIENT
Start: 2020-01-28 | End: 2020-02-10

## 2020-01-28 RX ORDER — OLANZAPINE 10 MG/1
20 TABLET, ORALLY DISINTEGRATING ORAL ONCE
Status: COMPLETED | OUTPATIENT
Start: 2020-01-28 | End: 2020-01-28

## 2020-01-28 RX ORDER — OLANZAPINE 10 MG/1
20 TABLET ORAL AT BEDTIME
Status: DISCONTINUED | OUTPATIENT
Start: 2020-01-28 | End: 2020-02-18 | Stop reason: HOSPADM

## 2020-01-28 RX ORDER — LORAZEPAM 1 MG/1
1 TABLET ORAL EVERY 8 HOURS PRN
Status: DISCONTINUED | OUTPATIENT
Start: 2020-01-28 | End: 2020-01-28 | Stop reason: CLARIF

## 2020-01-28 RX ORDER — OLANZAPINE 10 MG/1
10 TABLET ORAL
Status: DISCONTINUED | OUTPATIENT
Start: 2020-01-28 | End: 2020-02-18 | Stop reason: HOSPADM

## 2020-01-28 RX ORDER — RAMELTEON 8 MG/1
8 TABLET ORAL AT BEDTIME
Status: DISCONTINUED | OUTPATIENT
Start: 2020-01-28 | End: 2020-02-18 | Stop reason: HOSPADM

## 2020-01-28 RX ORDER — LORAZEPAM 0.5 MG/1
1 TABLET ORAL ONCE
Status: COMPLETED | OUTPATIENT
Start: 2020-01-28 | End: 2020-01-28

## 2020-01-28 RX ADMIN — RAMELTEON 8 MG: 8 TABLET, FILM COATED ORAL at 20:41

## 2020-01-28 RX ADMIN — LORAZEPAM 1 MG: 0.5 TABLET ORAL at 03:08

## 2020-01-28 RX ADMIN — OLANZAPINE 20 MG: 10 TABLET, FILM COATED ORAL at 20:41

## 2020-01-28 RX ADMIN — OLANZAPINE 20 MG: 10 TABLET, ORALLY DISINTEGRATING ORAL at 03:09

## 2020-01-28 SDOH — ECONOMIC STABILITY - HOUSING INSECURITY: HOMELESSNESS UNSPECIFIED: Z59.00

## 2020-01-28 ASSESSMENT — ACTIVITIES OF DAILY LIVING (ADL)
BATHING: 1-->ASSISTIVE EQUIPMENT
RETIRED_COMMUNICATION: 0-->UNDERSTANDS/COMMUNICATES WITHOUT DIFFICULTY
FALL_HISTORY_WITHIN_LAST_SIX_MONTHS: NO
LAUNDRY: WITH SUPERVISION
HYGIENE/GROOMING: PROMPTS
ORAL_HYGIENE: PROMPTS
COGNITION: 1 - ATTENTION OR MEMORY DEFICITS
TRANSFERRING: 0-->INDEPENDENT
TOILETING: 1-->ASSISTIVE EQUIPMENT
AMBULATION: 0-->INDEPENDENT
DRESS: SCRUBS (BEHAVIORAL HEALTH)
RETIRED_EATING: 0-->INDEPENDENT
DRESS: 1-->ASSISTIVE EQUIPMENT
SWALLOWING: 0-->SWALLOWS FOODS/LIQUIDS WITHOUT DIFFICULTY

## 2020-01-28 ASSESSMENT — MIFFLIN-ST. JEOR
SCORE: 1717.81
SCORE: 1630.72

## 2020-01-28 NOTE — PROGRESS NOTES
Sweatshirt  Boxers  Jeans  Black coat  Paperwork  Cigarettes  Lighter  Loose change  Gym shoes w/laces   $1.00                   Admission:  I am responsible for any personal items that are not sent to the safe or pharmacy.  [facility] is not responsible for loss, theft or damage of any property in my possession.    Signature:  _________________________________ Date: _______  Time: _____                                              Staff Signature:  ____________________________ Date: ________  Time: _____      2nd Staff person, if patient is unable/unwilling to sign:    Signature: ________________________________ Date: ________  Time: _____     Discharge:  [facility] has returned all of my personal belongings:    Signature: _________________________________ Date: ________  Time: _____                                          Staff Signature:  ____________________________ Date: ________  Time: _____

## 2020-01-28 NOTE — ED NOTES
Report off to halina LUCIO.  Ewa Sanchez RN,.......................................... 1/28/2020   3:13 AM

## 2020-01-28 NOTE — ED PROVIDER NOTES
"History     Chief Complaint:  Psychiatric Evaluation (Pt. brought in by [place] police after being contacted 2-3 times tonight lingering [facility]. Pt. does not anser questions or provide information assistance.)    History limited by: Patient not answering.     Hermann Arita is a 34 year old male with a history of disorganized behavior who presents to the emergency department today after being picked up by police at [facility] after standing and staring for several hours. He did not answer any questions about his health or current place of residence. Here the patient is still unwilling to answer questions other than to sat he is just fine. He states he has been taking his medications. The patient eloped from [facility] 2 weeks ago and was then discharged. He had not been located by them since.       Allergies:  No known drug allergies    Medications:    Ativan  Metformin  Zyprexa  Prilosec  Ramelteon  Trazodone    Past Medical History:    Disorganized behavior     Past Surgical History:    History reviewed. No pertinent surgical history.    Family History:    History reviewed. No pertinent family history.     Social History:  The patient arrives alone  Smoking current light smoker  Alcohol use not currenlty  Marital Status: Single [1]      Review of Systems   Unable to perform ROS: Psychiatric disorder       Physical Exam     Patient Vitals for the past 24 hrs:   BP Temp Temp src Heart Rate Resp SpO2 Height Weight   01/28/20 0311 -- -- -- -- -- 90 % -- --   01/28/20 0144 -- 97.5  F (36.4  C) Oral -- -- -- -- --   01/28/20 0034 118/63 -- -- 96 16 96 % 1.676 m (5' 6\") 75.3 kg (166 lb)     Physical Exam  Constitutional:  Appears well-developed and well-nourished. Cooperative. resting in the room with eyes open  HENT:   Head:    Atraumatic.   Mouth/Throat:   Oropharynx is without erythema or exudate and mucous     membranes are moist.   Eyes:    Conjunctivae normal and EOM are normal.      Pupils are equal, round, and " reactive to light.   Neck:    Normal range of motion. Neck supple.   Cardiovascular:  Normal rate, regular rhythm, normal heart sounds and radial and dorsalis pedis pulses are 2+ and symmetric.    Pulmonary/Chest:  Effort normal and breath sounds normal.   Abdominal:   Soft. Bowel sounds are normal.      No splenomegaly or hepatomegaly. No tenderness. No rebound.   Musculoskeletal:  Normal range of motion. No edema and no tenderness.   Neurological:  Alert. Normal strength. No cranial nerve deficit.   Skin:    Skin is warm and dry.   Psychiatric:   Responds slowly to some yes/no questions. Has limited eye contact. Disheveled. Flat affect    Emergency Department Course     Interventions:  0308 Lorazepam, 1 mg, IV injection  0309 Zyprexa 20 mg PO    Emergency Department Course:  Past medical records, nursing notes, and vitals reviewed.  0217: I performed an exam of the patient and obtained history, as documented above.     0231 I spoke with the [facility] regarding this patient.    0532: I rechecked the patient. Explained findings to patient.      The patient was signed out to Dr. Beverly, oncoming ED physician, pending DEC evaluation.  Impression & Plan      Medical Decision Making:  Hermann Arita is a 34 year old male who presents after being found standing and staring at [employer] for several hours and then at a gas station. Police brought him in because he is essentially non communicative. I reviewed his records and he has a history of schizophrenia. He was discharged from the hospital at the end of December and went to [facility] on a commitment. He reportedly eloped form there and could not be located. He was discharged by the facility after he had been gone for a week. Here the patient is not giving any significant information. He denies pain, illness, or injury. He indicates he has been taking his medications but this is unlikely as he left the facility without any medications. I gave him his dose of  Zyprexa and Ativan per his discharge summary. When I contacted [facility] they gave me the information from above. He will need to see DEC, and find out if there is a  and see if disposition other than hospitalization can be done.    Apart from his slowed mentation and apparent catatonia I did not find evidence of injury and he denies recent illness. His physical exam is benign. The police were concerned that he was out in the cold but I did not find evidence of cold injury.     He is signed out to the morning physician at 7am pending DEC evaluation      Diagnosis:    ICD-10-CM   1. Psychosis, unspecified psychosis type (H) F29   2. Catatonia F06.1   3. Homeless Z59.0       Disposition:   Signed out to Dr. Beverly    Scribe Disclosure:  I, Amanda Coughlin, am serving as a scribe at 2:17 AM on 1/28/2020 to document services personally performed by Gunnar Pennington MD based on my observations and the provider's statements to me.    Scribe Disclosure:  I, Breana Zuniga, am serving as a scribe on 1/28/2020 at 7:18 AM to personally document services performed by Scott Beverly MD based on my observations and the provider's statements to me.      EMERGENCY DEPARTMENT       Gunnar Pennington MD  01/28/20 6961

## 2020-01-28 NOTE — ED TRIAGE NOTES
Pt. brought in by [place] police after being contacted 2-3 times tonight lingering [facility]. Pt. does not anser questions or provide information assistance.

## 2020-01-28 NOTE — ED NOTES
"Read his rights.  He was informed he is going to the Mental Health Unit, sometime this evening, he answered \"OK\"  Apathetic mood.  "

## 2020-01-28 NOTE — ED NOTES
Pt signed out to me by Dr. Horton, pending re assessment. Pt on known legal commitment, now unable to cooperate after eloping from residence and not taking medications. Contacted central intake, and placed call to primary  with [facility]. Not sure if patient will be able to return to residence if uncooperative. Will continue to reassess, and place patient in line for admission if continued behaviours and non stable will consider admission, if more appropriate will  Consider transfer back to  [facility] for continued cares.       Scott Beverly MD  01/28/20 0723

## 2020-01-28 NOTE — PROGRESS NOTES
Pt was admitted on a 72 hour hold from [facility] ED for psychosis.  Pt was brought to the ED yesterday by the police after loitering and odd behavior in public.  He is currently under commitment through [facility] with a provisional discharge to [facility] with a Redman order.  Pt has eloped from [facility] over 2 weeks ago and apparently his bed was given up when they were unable to locate him. Pt has been living in the streets and said he has not taken any medications since leaving his group home. Pt's  said she will come to meet with the pt to determine if they are wanting to revoke his provisional discharge.  Hz of schizophrenia and substance use disorder.  UTOX positive for cannabinoids. Withdrawn, minimal responses to questions.  Pt was laying down and resting through much of the admission.  Disheveled, untidy, neglected grooming. Flat, depressed.     PTA med rec not completed.  Pharmacy was unable to get a list of meds from [facility] since he is no longer a patient there.  Zyprexa and Rozerum were restarted.  Comfort meds ordered.    Nursing assessment complete including patient and medication profiles. Risk assessments completed addressing suicide,fall,skin,nutrition and safety issues. Care plan initiated. Assessments reviewed with physician and admit orders received. Video monitoring in progress, Patient Informed.  Welcome packet reviewed with patient. Information reviewed includes getting emergency help, preventing infections, understanding your care, using medication safely, reducing falls, preventing pressure ulcers, smoking cessation, powerful choices and Patients Bill of Rights. Pt. given tour of the unit and instruction on use of facility including emergency call light. Program schedule reviewed with patient. Questions regarding the unit addressed. Pt. Search completed and belongings inventoried.

## 2020-01-28 NOTE — TELEPHONE ENCOUNTER
S - 34/M [employer] for MH concerns after being found in local business not responding   B - pmh/x  Schizophrenia and polysubstance abuse. Pt.  Is currently under provisional discharge, pt. Had been provisionally discharged to [facility] w/ [facility] order. Pt. Eloped from [facility] 2 weeks ago. Pt. [employer] due to being found at [facility] and unable to respond to questions. In ED pt. Is minimally cooperative and having possible sxs of catatonia per [facility] DEC. Pt. Refused to open his eyes and answered yes or no to most questions and did not answer many others. Per  at this she is not revoking his provisional discharge but will meet with him and assess his progress in the ED before making a decision.   A - health officer hold  R - Pt. Placed on wait list for next available appropriate bed    Patient cleared and ready for behavioral bed placement: Yes

## 2020-01-28 NOTE — ED NOTES
Care discussed with Lalo Cornelius at [facility].  Patient was held a bed for 18 days after elopement but bed has been since given away.  Care was consulted with Mildred Green and Whit blas [phone] who is patients , and was told likely patient returned to homeless community and ok to give away bed after 18 days. No bed available at [facility] today.       Scott Beverly MD  01/28/20 0870

## 2020-01-28 NOTE — ED NOTES
Pt compliant with assistance into behavioral scrubs. Pt sill not answering when asked if he is hot/cold.  Clothes placed in washer at this time.   Pt shakes his head no when asked if he is hungry.  Pt instructed to use call light with any needs.  Ewa Sanchez RN,.......................................... 1/28/2020   1:41 AM

## 2020-01-29 LAB — HBA1C MFR BLD: 5.1 % (ref 0–5.6)

## 2020-01-29 PROCEDURE — 12400000 ZZH R&B MH

## 2020-01-29 PROCEDURE — 99223 1ST HOSP IP/OBS HIGH 75: CPT | Performed by: NURSE PRACTITIONER

## 2020-01-29 PROCEDURE — 25000132 ZZH RX MED GY IP 250 OP 250 PS 637: Mod: GY | Performed by: PSYCHIATRY & NEUROLOGY

## 2020-01-29 RX ADMIN — OLANZAPINE 20 MG: 10 TABLET, FILM COATED ORAL at 21:05

## 2020-01-29 RX ADMIN — RAMELTEON 8 MG: 8 TABLET, FILM COATED ORAL at 21:05

## 2020-01-29 ASSESSMENT — ACTIVITIES OF DAILY LIVING (ADL)
DRESS: INDEPENDENT
LAUNDRY: WITH SUPERVISION
HYGIENE/GROOMING: PROMPTS
ORAL_HYGIENE: PROMPTS

## 2020-01-29 NOTE — PLAN OF CARE
Pt refusing to talk with staff. Pt refused to see the doctor and wants the doctor to come back tomorrow. Pt eating meals in room, returns to bed. Isolative, withdrawn, non cooperative.

## 2020-01-29 NOTE — PLAN OF CARE
BEHAVIORAL TEAM DISCUSSION    Participants: MD, RN, CM, PA, OT   Progress: No change. Patient admitted on 01/20/2020.   Anticipated length of stay: Until psychiatrically stable.   Continued Stay Criteria/Rationale: Patient in on provisional discharge for commitment in [facility]. Patient will need appropriate discharge disposition due to commitment status.   Medical/Physical: Per hospitalist consult  Precautions:   Behavioral Orders   Procedures    Code 1 - Restrict to Unit    Routine Programming     As clinically indicated    Status 15     Every 15 minutes.     Plan: Restart medications. Work with patient's county  on transitioning back into the community.   Rationale for change in precautions or plan: Continued stabilization.

## 2020-01-29 NOTE — PLAN OF CARE
"[id number]: Pt calm, isolative and withdrawn to room for entire shift. Denies  or visual hallucinations. Pt didn't want to talk or provided short answers to any/all questions. \"Just want to sleep, thank you.\"  Medication compliant.   "

## 2020-01-29 NOTE — PROGRESS NOTES
reviewed social history done on 11/03/19 during patient's most recent admission at [facility] (11/2/19-12/30/19), and it remains relevant and up to date. Patient was admitted to [facility] in 01/28/2020 due to decompensation in the context of going off of his medications. He is currently on a 72 hour hold which expires on 01/31/2020 at 1359. Patient is also currently on a provisional discharge of his commitment through [facility], and this remains in effective through 05/15/2020. Per conversation with his county , she is not planning on revoking patient's provisional discharge unless he is demanding to leave without a viable discharge plan in place. Case Management will remain available to assist with any discharge needs.

## 2020-01-29 NOTE — H&P
"Admitted:     01/28/2020      IDENTIFYING DATA AND REASON FOR REFERRAL:  Hermann Arita is a 34-year-old man who reportedly is undomiciled having walked away from [facility] about 2 weeks ago and discontinued medication management.  He is on a stay of commitment from [facility] following a recent discharge from [facility], from where he was discharged on 12/30.  He was admitted on a 72-hour hold that expires on 01/31/2020 at 1:59 p.m. Information was gathered through direct patient contact as well as chart review.      CHIEF COMPLAINT:  \"I am too tired to talk come back tomorrow.\"      HISTORY OF PRESENT ILLNESS:  Hermann Arita reportedly has established history of mental illness previously characterized as schizophrenia.  He was most recently discharged from the [facility] on 12/30/2019 with a plan for him to follow up on 01/23/2020 with his psychiatric provider, KARINE Marks CNP at [address], [place], [place], but the patient was lost to followup about 2 weeks ago after he eloped from [facility].  His mental health targeted , Mildred Green, is aware of his hospitalization and per records from the ED does not want to revoke his provisional discharge  at this point.  Of note, the patient reportedly was picked up by police at a local [employer] store after standing and staring for several hours.  He did not answer any questions about his health or current residence.  In the ED he was also uncooperative with his assessment, but he turned out that he had eloped from [facility] 2 weeks prior and was then discharged from that facility he had not been located by that facility until now.  It appears that the patient just went back to being homeless and defaulted on his prescribed medications.  He was supposed to be on a combination of Zyprexa, Ativan, trazodone, and Rozerem.  Urine " toxicology screen done on admission was positive for cannabinoids.  Urinalysis did not show any evidence of infection.  His hemogram was within normal limits as was his BMP.      PAST PSYCHIATRIC HISTORY:  As indicated above, the patient was most recently on admission at [facility], where a petition for his civil commitment was initiated and a Redman order obtained, but he was discharged on a provisional discharge from that facility to [facility].  His Redman order includes Zyprexa, Risperdal, clozapine and haloperidol.  Prior to that he had had other hospitalizations at [facility].      CHEMICAL USE HISTORY:  The patient does have a history of abusing marijuana, methamphetamines, heroin and bath salts.  He reportedly has been in CD treatment in the past and he continues to abuse chemicals.  Records suggest that he had been through CD treatment at the [facility] in the past.      PAST MEDICAL HISTORY:  GERD, no recorded history of seizures, hypertension, diabetes, thyroid disease or HIV exposure.      PAST SURGICAL HISTORY:  None reported.      MEDICATIONS PRIOR TO ADMISSION:  None, even though he was supposed to be on Zyprexa, Ativan, trazodone and Rozerem.      FAMILY PSYCHIATRIC HISTORY:  None reported.      SOCIAL HISTORY:  Records suggest that the patient was raised in [place].  He is one of unknown siblings and reportedly is single but has 2 children.  He reportedly had been homeless for a number of years before he was placed at [facility] during his most recent hospitalization.  He reportedly completed 9 years of college and did not participate in special education classes.  He reportedly has had a run-ins with the law including charges for disorderly conduct, tampering with motor vehicles, panhandling and lewd behavior.  He has no  history.      REVIEW OF SYSTEMS:  A 10-point review of systems was attempted  but this could not be completed because of the patient's refusal to respond to questions.      VITAL SIGNS:  Blood pressure 100/71, pulse 85, respirations 15, temperature 97.3, weight 80.8 kg.      MENTAL STATUS EXAMINATION:  This is a middle-aged man who appears his stated age of 34.  He is seen at his bedside where he is lying on his side covered with a blanket up to his neck.  He briefly opens his eyes to his name, but then shuts them throughout the rest of this assessment, he is irritable and dismissive and appears internally preoccupied, but does not endorse any self-harm thoughts or plans.  He does not respond to queries regarding the presence of auditory or visual hallucinations.  The rest of his mental status examination is deferred on account of his lack of cooperation.      DIAGNOSTIC IMPRESSION:  Hermann Arita is a 34-year-old single father of 2 with history of schizophrenia, who is on a provisional discharge from [facility], where he was civilly committed on account of medication noncompliance and a history of schizophrenia.  He had eloped from [facility] about 2 weeks ago and has been off medications since then.  He will be restarted on prior to admission medications.      DIAGNOSES:   1.  Schizophrenia, in acute phase.   2.  Cannabis use disorder.   3.  Gastroesophageal reflux disease.      PLAN:  The patient will be maintained on the ITC.  He will be started back on Zyprexa Zydis at 20 mg at bedtime and Rozerem at 8 mg daily at bedtime.  He will be a good candidate for a long-acting injectable antipsychotic medication consequently, I will start him on low dose haldol to pave the way for the use of Haldol Decanoate. The unit  will work with his mental health  to figure out a safe disposition for him given the fact that he is currently on a provisional discharge.  Estimated length of stay 5-7 days.         SYED DIALLO MD              D: 2020   T: 2020   MT: FIONA      Name:     RALPH BAE   MRN:      [id number]        Account:      [id number]   :      1985        Admitted:     2020                   Document: [id number]

## 2020-01-29 NOTE — PROGRESS NOTES
Patient is currently under civil commitment in [facility] for mental illness through 05/15/2020.  reached out to his [facility] , Mildred Green ([phone]), to gather additional collateral information on patient. She stated that as long as patient is cooperative with being hospitalized, she will not revoke his provisional discharge. She stated that when he was at [facility] it took quite a long time for patient to come around. She stated that patient's family lives in [place] and patient has previously lived in [place] as well. She states that it may be difficult for him to stay anywhere for any length of time. She discussed that patient was previously referred to [facility], but had eloped from the facility. She is unsure if [facility] would be willing to reconsider patient or if perhaps another IRTS option should be pursued. She plans to come to the hospital on Friday to meet with patient, and she will meet with  as well to discuss discharge options once patient is stable again.

## 2020-01-29 NOTE — H&P
"Admitted:     01/28/2020      PRIMARY CARE PROVIDER:  Lonnie Bennett MD        PSYCHIATRIST:  Dr. Lea Woods.      CHIEF COMPLAINT:  Psychiatry history and physical.      HISTORY OF PRESENT ILLNESS:  Mr. Hermann Arita is a 34-year-old male with past medical history significant for schizophrenia, polysubstance abuse disorder including alcohol and amphetamines, gastroesophageal reflux disease without esophagitis, bipolar 1 disorder, anxiety, schizoaffective bipolar type, attention deficit disorder, remote history of asthma and depression who initially presented on 01/28/2020 after police were called 2-3 times regarding patient due to loitering and odd behavior while at [employer].  The patient was noted to be found standing and staring for several hours.  The patient was brought to the Emergency Department by police under a police hold and was noted to be under commitment through [facility] with provisional discharged to [facility]; however, patient eloped approximately 2 weeks prior and his bed was discharged as he had not been found.  The patient reports having lived on the streets without medications for the past 2 weeks.  The patient's urine tox screen was positive for cannabinoids; however, reports no further drug use.  The patient had been placed on a 72-hour hold as well.  Of note, patient recently was admitted to [facility] from 11/2019 to 01/2020.      Presently, patient is seen on the Intensive Treatment Center portion of the Psychiatry Unit.  The patient is lying in bed with room temperature very warm and completely covered to the top of his head with his bedding.  The patient states that he already ate his breakfast and is now trying to sleep.  The patient only answered a few questions when he stated \"no further questions.\"  The patient refused further assessment and examination as well.  Due to patient's refusal to answer questions, no review of systems was able to be obtained.     Noted " patient had been started on metformin by his PCP at his most recent appointment to establish patient care after discharge from [place].  No definitive diagnosis of diabetes mellitus.  Hemoglobin A1C 5.1 today.  Will defer resuming metformin at this time as no reason for initiation was discussed in PCP progress note from 1/7/20.     PAST MEDICAL HISTORY:   1.  Schizophrenia.   2.  Polysubstance use disorder, including alcohol and amphetamines.   3.  Gastroesophageal reflux disease without esophagitis.   4.  Bipolar 1 disorder.   5.  Anxiety.   6.  Schizoaffective, bipolar type.   7.  Attention deficit disorder.   8.  Mild intermittent asthma.   9.  Depression.      PAST SURGICAL HISTORY:  Reviewed.  The patient states no surgeries in the past.      SOCIAL HISTORY:   1.  Tobacco:  Current tobacco smoker; however, refused to state amount daily.   2.  Alcohol, unable to assess.   3.  Illicit drugs.  The patient denies use of substances; however, urine tox was positive for cannabinoids.   4.  The patient is currently homeless.      FAMILY HISTORY:   1.  Father:  Myocardial infarction.   2.  Maternal grandmother:  Cancer.      ALLERGIES:  SHELLFISH.      MEDICATIONS:   Prior to Admission medications    Medication Sig Last Dose Taking? Auth Provider   LORazepam (ATIVAN) 1 MG tablet Take 1 tablet (1 mg) by mouth 3 times daily   Percy Cordova MD   metFORMIN (GLUCOPHAGE) 500 MG tablet Take 1 tablet (500 mg) by mouth 2 times daily (with meals)   Kelsea Salgado MD   metFORMIN (GLUCOPHAGE) 500 MG tablet Take 1 tablet (500 mg) by mouth 2 times daily (with meals)   Kelsea Salgado MD   OLANZapine zydis (ZYPREXA) 20 MG ODT Take 1 tablet (20 mg) by mouth At Bedtime   Kelsea Salgado MD   OLANZapine zydis (ZYPREXA) 20 MG ODT Take 1 tablet (20 mg) by mouth At Bedtime   Kelsea Salgado MD   omeprazole (PRILOSEC) 20 MG DR capsule Take 1 capsule (20 mg) by mouth every morning (before breakfast)   Kelsea Salgado  MD   omeprazole (PRILOSEC) 20 MG DR capsule Take 1 capsule (20 mg) by mouth daily   Kelsea Salgado MD   ramelteon (ROZEREM) 8 MG tablet Take 1 tablet (8 mg) by mouth At Bedtime   Kelsea Salgado MD   ramelteon (ROZEREM) 8 MG tablet Take 1 tablet (8 mg) by mouth At Bedtime   Kelsea Salgado MD   traZODone (DESYREL) 100 MG tablet Take 2 tablets (200 mg) by mouth At Bedtime   Kelsea Salgado MD   traZODone (DESYREL) 100 MG tablet Take 2 tablets (200 mg) by mouth At Bedtime   Kelsea Salgado MD     REVIEW OF SYSTEMS:  Unable to obtain due to patient refusing to answer further questions.      PHYSICAL EXAMINATION:   VITAL SIGNS:  Reviewed and are as follows:  Temperature 97.3, blood pressure 100/71, heart rate 85, respiratory rate 15, O2 sats 100% on room air.   CONSTITUTIONAL:  The patient lying in bed, easily awakens, in no apparent distress, disheveled appearing, poorly groomed.   HEENT:  Extraocular muscles intact.  Sclerae are clear.  Normocephalic, atraumatic.   NECK:  Normal range of motion, no nuchal rigidity noted.   LUNGS:  No increased work of breathing noted.   CARDIOVASCULAR:  Refused exam.   ABDOMEN:  Unable to assess due to refusing to remove the bedding.     EXTREMITIES:  Noted the patient to be moving all extremities; however, refused examination.   NEUROLOGIC:  Easily awakens.  Alert.  Appears to have slurred speech at times.   SKIN:  Unable to assess due to patient refusal.   PSYCHIATRIC:  Affect flat and appears agitated and restless.      LABORATORY DATA:  Reviewed in Epic.      ASSESSMENT AND PLAN:  Mr. Hermann Arita is a 34-year-old male with past medical history significant for schizophrenia and polysubstance use disorder, including alcohol and amphetamines, gastroesophageal reflux disease without esophagitis, bipolar 1 disorder, anxiety, schizoaffective disorder, bipolar type, attention deficit disorder, asthma and depression who was initially brought to the Emergency  "Department by police hold on 2020  after being found malingering and loitering at [employer] for several hours staring and standing with noted odd behavior.  The patient was brought to the Emergency Department and was noted to be a commitment to [facility] with provisional discharge to [facility]; however, the patient eloped approximately 2 weeks ago and has been homeless for the past 2 weeks.  The patient has been admitted to Inpatient Psychiatry for further evaluation and management.      Schizophrenia, decompensated.   -- Defer management of above to primary Psychiatry Service.   -- Please obtain repeat EKG to evaluate QTc if increasing doses of current medications.      Cannabis use disorder.  History of polysubstance abuse.  -- UTOX + for cannabinoids on admission.  -- Encourage cessation.      Gastroesophageal reflux disease without esophagitis.   --  Continue PTA Prilosec.     History of mild intermittent asthma.   --  The patient states, \"this has not been an issue for a long time\" and is currently not on an inhaler.  We will continue to monitor at this time.     DVT prophylaxis.  Low VTE risk, encourage out of bed and ambulation daily.      CODE STATUS:  Per Psychiatry Service.      DISPOSITION:  Per Psychiatry Service.      This patient was discussed with Dr. Keisha Bhardwaj of the Hospitalist Service, who agrees with current plans as outlined above.         KEISHA BHARDWAJ MD       As dictated by KAYA FERRARA NP            D: 2020   T: 2020   MT: JOSE      Name:     RALPH BAE   MRN:      [id number]        Account:      [id number]   :      1985        Admitted:     2020                   Document: [id number]      "

## 2020-01-30 PROCEDURE — 25000132 ZZH RX MED GY IP 250 OP 250 PS 637: Mod: GY | Performed by: NURSE PRACTITIONER

## 2020-01-30 PROCEDURE — 12400000 ZZH R&B MH

## 2020-01-30 PROCEDURE — 25000132 ZZH RX MED GY IP 250 OP 250 PS 637: Mod: GY | Performed by: PSYCHIATRY & NEUROLOGY

## 2020-01-30 RX ORDER — BENZTROPINE MESYLATE 0.5 MG/1
0.5 TABLET ORAL 2 TIMES DAILY PRN
Status: DISCONTINUED | OUTPATIENT
Start: 2020-01-30 | End: 2020-02-18 | Stop reason: HOSPADM

## 2020-01-30 RX ORDER — HALOPERIDOL 5 MG/1
5 TABLET ORAL 2 TIMES DAILY
Status: DISCONTINUED | OUTPATIENT
Start: 2020-01-30 | End: 2020-02-07

## 2020-01-30 RX ADMIN — RAMELTEON 8 MG: 8 TABLET, FILM COATED ORAL at 21:12

## 2020-01-30 RX ADMIN — HALOPERIDOL 5 MG: 5 TABLET ORAL at 11:18

## 2020-01-30 RX ADMIN — HYDROXYZINE HYDROCHLORIDE 25 MG: 25 TABLET, FILM COATED ORAL at 16:28

## 2020-01-30 RX ADMIN — OMEPRAZOLE 20 MG: 20 CAPSULE, DELAYED RELEASE ORAL at 09:00

## 2020-01-30 RX ADMIN — HALOPERIDOL 5 MG: 5 TABLET ORAL at 21:12

## 2020-01-30 RX ADMIN — OLANZAPINE 20 MG: 10 TABLET, FILM COATED ORAL at 21:12

## 2020-01-30 ASSESSMENT — ACTIVITIES OF DAILY LIVING (ADL)
ORAL_HYGIENE: INDEPENDENT
DRESS: INDEPENDENT
LAUNDRY: UNABLE TO COMPLETE
HYGIENE/GROOMING: INDEPENDENT

## 2020-01-30 NOTE — PLAN OF CARE
Pt has been isolative and withdrawn in his room throughout the day shift; bed resting/napping. Presents a flat, irritable and tense affect; mood is calm. Pt came out for meals but goes back to his room shortly after. No groups. No shower; untidy.

## 2020-01-30 NOTE — PROGRESS NOTES
[facility] Psychiatric Progress Note      Interval History:   Pt seen, team meeting held with , nursing staff, OT, and PA's to review diagnosis and treatment plan. Staff report that patient has remained minimally verbal and isolative. On direct interview, he was selectively mute even though his body language suggests that he understood the questions he was being asked. He presents as restless and fidgety but non-responsive. He does not display any abnormal involuntary movements. He has so far complied with and is tolerating medications well without significant side effects. Given his recent medication non-compliance I will offer him oral haldol with the plan to transition him to haldol decanoate.       Review of systems:   The Review of Systems completed by Willard Hurley MD is negative other than noted in the HPI     Medications:       OLANZapine  20 mg Oral At Bedtime     omeprazole  20 mg Oral QAM AC     ramelteon  8 mg Oral At Bedtime     acetaminophen, bisacodyl, hydrOXYzine, magnesium hydroxide, OLANZapine **OR** OLANZapine, traZODone    Mental Status Examination:     Appearance:  dressed in hospital scrubs, appeared older than stated age and fatigued  Attitude:  evasive, guarded and uncooperative  Eye Contact:  poor   Mood:  anxious  Affect:  mood congruent, guarded and restricted range  Speech:  paucity of speech  Psychomotor Behavior:  no evidence of tardive dyskinesia, dystonia, or tics and fidgeting  Thought Process:  difficult to assess  Associations:  no loose associations  Thought Content:  patient appears to be responding to internal stimuli and appears paranoid  Insight:  limited  Judgment:  poor  Oriented to:  time, person, and place  Attention Span and Concentration:  limited  Recent and Remote Memory:  limited  Language: Able to read and write  Fund of Knowledge: appropriate  Muscle Strength and Tone: normal  Gait and Station: Normal          Labs/Vitals:  "  /67   Pulse 76   Temp 98.5  F (36.9  C) (Oral)   Resp 16   Ht 1.727 m (5' 8\")   Wt 80.8 kg (178 lb 3.2 oz)   SpO2 96%   BMI 27.10 kg/m    No results found for this or any previous visit (from the past 24 hour(s)).    Impression:   Hermann Arita is a 34-year-old single father of 2 with history of schizophrenia, who is on a provisional discharge from [facility], where he was civilly committed on account of medication noncompliance and a history of schizophrenia.  He had eloped from [facility] about 2 weeks ago and has been off medications since then.  He will be restarted on prior to admission medications.       DIagnoses:     1.  Schizophrenia, in acute phase.   2.  Cannabis use disorder.   3.  Gastroesophageal reflux disease.          Plan:   1. Written information given on medications. Side effects, risks, benefits reviewed.  2. Medication changes: Resume lorazepam at 0.5 mg tid and add Haldol 5 mg bid.  3. Legal Status: 72-hour hold.  4. Continue hospitalization.      Attestation:  Patient has been seen and evaluated by Willard rogers MD today.    PATIENT ID  Name: Hermann Arita  MRN:[id number]  YOB: 1985  "

## 2020-01-30 NOTE — PHARMACY-ADMISSION MEDICATION HISTORY
Pharmacy Medication History  Admission medication history interview status for the 1/28/2020  admission is complete. See EPIC admission navigator for prior to admission medications     Medication history sources: Spoke with nurse at patient's group home that he had left ([facility] she stated that patient had come from [facility] and that group home had made no changes to his medications in the time he was there. I reviewed the patient's discharge summary from 12/30/2019 and updated the list per summary from [facility].  Medication history source reliability: Moderate  Adherence assessment: Poor      Medication reconciliation completed by provider prior to medication history? No    Time spent in this activity: 30 minutes      Prior to Admission medications    Medication Sig Last Dose Taking? Auth Provider   LORazepam (ATIVAN) 1 MG tablet Take 1 tablet (1 mg) by mouth 3 times daily  Yes Percy Cordova MD   metFORMIN (GLUCOPHAGE) 500 MG tablet Take 1 tablet (500 mg) by mouth 2 times daily (with meals)  Yes Kelsea Salgado MD   OLANZapine zydis (ZYPREXA) 20 MG ODT Take 1 tablet (20 mg) by mouth At Bedtime  Yes Kelsea Salgado MD   omeprazole (PRILOSEC) 20 MG DR capsule Take 1 capsule (20 mg) by mouth daily  Yes Kelsea Salgado MD   ramelteon (ROZEREM) 8 MG tablet Take 1 tablet (8 mg) by mouth At Bedtime  Yes Kelsea Salgado MD   traZODone (DESYREL) 100 MG tablet Take 2 tablets (200 mg) by mouth At Bedtime  Yes Kelsea Salgado MD

## 2020-01-31 PROCEDURE — 12400000 ZZH R&B MH

## 2020-01-31 PROCEDURE — 25000132 ZZH RX MED GY IP 250 OP 250 PS 637: Mod: GY | Performed by: PSYCHIATRY & NEUROLOGY

## 2020-01-31 PROCEDURE — 25000132 ZZH RX MED GY IP 250 OP 250 PS 637: Mod: GY | Performed by: NURSE PRACTITIONER

## 2020-01-31 RX ADMIN — OLANZAPINE 20 MG: 10 TABLET, FILM COATED ORAL at 20:58

## 2020-01-31 RX ADMIN — OMEPRAZOLE 20 MG: 20 CAPSULE, DELAYED RELEASE ORAL at 08:29

## 2020-01-31 RX ADMIN — HALOPERIDOL 5 MG: 5 TABLET ORAL at 20:58

## 2020-01-31 RX ADMIN — HALOPERIDOL 5 MG: 5 TABLET ORAL at 08:30

## 2020-01-31 RX ADMIN — RAMELTEON 8 MG: 8 TABLET, FILM COATED ORAL at 20:58

## 2020-01-31 ASSESSMENT — ACTIVITIES OF DAILY LIVING (ADL)
LAUNDRY: WITH SUPERVISION
DRESS: INDEPENDENT
ORAL_HYGIENE: INDEPENDENT
HYGIENE/GROOMING: INDEPENDENT
ORAL_HYGIENE: INDEPENDENT
HYGIENE/GROOMING: INDEPENDENT
DRESS: INDEPENDENT

## 2020-01-31 NOTE — PROGRESS NOTES
Patient's [facility] , Mildred Green ([phone] / [phone] fax), came to the hospital today to meet with patient. She stated that patient is agreeable to remaining hospitalized until appropriate placement can be found in the community. She reiterated that she will not revoke patient's provisional discharge as long as he is taking his medications and is cooperative with the treatment plan.  reviewed that the psychiatrist plans to transition patient to a long-acting injectable, and she is in agreement with this plan. She suggested reaching out to [facility] to see if they would take patient back in addition to making IRTS referrals as a back up plan.  will work on making referrals on Monday and will keep her updated on discharge plans.

## 2020-01-31 NOTE — PLAN OF CARE
Problem: Psychotic Symptoms  Goal: Psychotic Symptoms  Description  Signs and symptoms of listed problems will be absent or manageable.  Flowsheets (Taken 1/31/2020 1500)  Psychotic Symptoms Assessed: all  Psychotic Symptoms Present: affect;mood;insight;thought process;psychomotor activity  Note:   Patient vitals were WNL. Patient had no medication changes and was medication compliant. Patient took no PRNs. Patient was isolative and withdrawn. Patient denies any auditory hallucinations but states that he is depressed. Patient exhibits a tremor. Patient presents with a flat affect and appears internally preoccupied. Patient slept for most of the shift. Patient does respond to staff politely but is guarded.

## 2020-01-31 NOTE — PLAN OF CARE
Shift Update: Pt mood is depressed and flat, restless affect. Pt was isolative and withdrawn and had a difficult time responding to staff. Pt would nod head, or ignore questions. Pt was prompted to shower and to eat meals (he eventually did both).  Thought process is impaired and insight is poor. Pt denies suicidal ideation. Pt appears responding to internal stimuli.

## 2020-01-31 NOTE — PROGRESS NOTES
[facility] Psychiatric Progress Note      Interval History:   Pt seen, team meeting held with , nursing staff, OT, and PA's to review diagnosis and treatment plan. Staff report that patient remained withdrawn but comes out for meals. He says he got lost after leaving [facility] and could not find his way back there. He says he is open to being placed at whatever facility his  feels is appropriate. He is agreeable with plan to place him on BRYAN antipsychotic medication. Tolerating medications well without significant side effects. Denies suicidal or homicidal ideation. His personal hygiene remains poor.     Review of systems:   The Review of Systems completed by Willard Hurley MD is negative other than noted in the HPI     Medications:       haloperidol  5 mg Oral BID     OLANZapine  20 mg Oral At Bedtime     omeprazole  20 mg Oral QAM AC     ramelteon  8 mg Oral At Bedtime     acetaminophen, benztropine, bisacodyl, hydrOXYzine, magnesium hydroxide, OLANZapine **OR** OLANZapine, traZODone    Mental Status Examination:     Appearance:  dressed in hospital scrubs, appeared older than stated age and odoriferous  Attitude:  evasive and guarded  Eye Contact:  better  Mood:  better  Affect:  mood congruent, guarded and restricted range  Speech:  clear, coherent  Psychomotor Behavior:  no evidence of tardive dyskinesia, dystonia, or tics and fidgeting  Thought Process:  logical, linear and goal oriented  Associations:  no loose associations  Thought Content:  patient appears to be responding to internal stimuli and appears paranoid but denies the presence of auditory hallucinations.  Insight:  fair  Judgment:  poor  Oriented to:  time, person, and place  Attention Span and Concentration:  limited  Recent and Remote Memory:  limited  Language: Able to read and write  Fund of Knowledge: appropriate  Muscle Strength and Tone: normal  Gait and Station: Normal           "Labs/Vitals:   BP (!) 142/79   Pulse 74   Temp 98  F (36.7  C) (Oral)   Resp 16   Ht 1.727 m (5' 8\")   Wt 80.8 kg (178 lb 3.2 oz)   SpO2 98%   BMI 27.10 kg/m    No results found for this or any previous visit (from the past 24 hour(s)).    Impression:   Hermann Arita is a 34-year-old single father of 2 with history of schizophrenia, who is on a provisional discharge from [facility], where he was civilly committed on account of medication noncompliance and a history of schizophrenia.  He had eloped from [facility] about 2 weeks ago and has been off medications since then.  He will be restarted on prior to admission medications.       DIagnoses:     1.  Schizophrenia, in acute phase.   2.  Cannabis use disorder.   3.  Gastroesophageal reflux disease.          Plan:   1. Written information given on medications. Side effects, risks, benefits reviewed.  2. Medication changes: None.  3. Legal Status: 72-hour hold.  4. Continue hospitalization.      Attestation:  Patient has been seen and evaluated by me, Willard Hurley MD today.    PATIENT ID  Name: Hermann Arita  MRN:[id number]  YOB: 1985  "

## 2020-02-01 PROCEDURE — 25000132 ZZH RX MED GY IP 250 OP 250 PS 637: Mod: GY | Performed by: PSYCHIATRY & NEUROLOGY

## 2020-02-01 PROCEDURE — 12400000 ZZH R&B MH

## 2020-02-01 PROCEDURE — 25000132 ZZH RX MED GY IP 250 OP 250 PS 637: Mod: GY | Performed by: NURSE PRACTITIONER

## 2020-02-01 RX ADMIN — OLANZAPINE 20 MG: 10 TABLET, FILM COATED ORAL at 21:01

## 2020-02-01 RX ADMIN — HALOPERIDOL 5 MG: 5 TABLET ORAL at 21:01

## 2020-02-01 RX ADMIN — RAMELTEON 8 MG: 8 TABLET, FILM COATED ORAL at 21:01

## 2020-02-01 RX ADMIN — HALOPERIDOL 5 MG: 5 TABLET ORAL at 08:01

## 2020-02-01 RX ADMIN — OMEPRAZOLE 20 MG: 20 CAPSULE, DELAYED RELEASE ORAL at 08:01

## 2020-02-01 ASSESSMENT — ACTIVITIES OF DAILY LIVING (ADL)
ORAL_HYGIENE: INDEPENDENT
DRESS: INDEPENDENT
HYGIENE/GROOMING: INDEPENDENT

## 2020-02-01 NOTE — PLAN OF CARE
Problem: Psychotic Symptoms  Goal: Psychotic Symptoms  Description  Signs and symptoms of listed problems will be absent or manageable.  Anxiety,   Internal preoccupation.   1/31/2020 2241 by Elizabeth Mar RN  Outcome: No Change  Flowsheets (Taken 1/31/2020 2241)  Psychotic Symptoms Assessed: all  Psychotic Symptoms Present: affect; mood; thought process  Note:   Patient mostly in room but did elect to be in the lounge with peers during dinner and also to watch some TV. Patient is medication compliant. Patient offers minimal answers when staff attempts to engage in conversation. Patient declined wearing socks. Patient did decline suicidal ideation and contracts for safety.

## 2020-02-01 NOTE — PLAN OF CARE
Patient mostly in room but did spend couple of hours in the lounge. Refused taking.Patient is medication compliant. Patient offers minimal answers when staff attempts to engage in conversation. Denies SI.

## 2020-02-02 PROCEDURE — 25000132 ZZH RX MED GY IP 250 OP 250 PS 637: Mod: GY | Performed by: PSYCHIATRY & NEUROLOGY

## 2020-02-02 PROCEDURE — 25000132 ZZH RX MED GY IP 250 OP 250 PS 637: Mod: GY | Performed by: NURSE PRACTITIONER

## 2020-02-02 PROCEDURE — 12400000 ZZH R&B MH

## 2020-02-02 RX ADMIN — HALOPERIDOL 5 MG: 5 TABLET ORAL at 20:46

## 2020-02-02 RX ADMIN — RAMELTEON 8 MG: 8 TABLET, FILM COATED ORAL at 20:46

## 2020-02-02 RX ADMIN — HALOPERIDOL 5 MG: 5 TABLET ORAL at 09:04

## 2020-02-02 RX ADMIN — OMEPRAZOLE 20 MG: 20 CAPSULE, DELAYED RELEASE ORAL at 09:04

## 2020-02-02 RX ADMIN — OLANZAPINE 20 MG: 10 TABLET, FILM COATED ORAL at 20:47

## 2020-02-02 ASSESSMENT — ACTIVITIES OF DAILY LIVING (ADL)
ORAL_HYGIENE: INDEPENDENT
HYGIENE/GROOMING: INDEPENDENT
HYGIENE/GROOMING: INDEPENDENT
ORAL_HYGIENE: INDEPENDENT
LAUNDRY: WITH SUPERVISION
HYGIENE/GROOMING: INDEPENDENT
DRESS: INDEPENDENT
DRESS: INDEPENDENT
ORAL_HYGIENE: INDEPENDENT
DRESS: INDEPENDENT
LAUNDRY: WITH SUPERVISION

## 2020-02-02 NOTE — PLAN OF CARE
Problem: Psychotic Symptoms  Goal: Psychotic Symptoms  Description  Signs and symptoms of listed problems will be absent or manageable.  Anxiety,   Internal preoccupation.   Outcome: No Change  Flowsheets (Taken 2/1/2020 6572)  Psychotic Symptoms Assessed: all  Psychotic Symptoms Present: insight; affect; other (see comment); thought process  Note:   Pt presents as depressed with an incongruent affect. Pt spent the majority of the evening resting in bed, and appeared internally preoccupied during his few interactions with staff. Pt is minimal in his responses to questions, but compliant with medications and care. Insight is poor and judgement is impaired. Denies SI.

## 2020-02-02 NOTE — PLAN OF CARE
Problem: Psychotic Symptoms  Goal: Psychotic Symptoms  Description  Signs and symptoms of listed problems will be absent or manageable.  Anxiety,   Internal preoccupation.   Flowsheets (Taken 2/2/2020 5947)  Psychotic Symptoms Assessed: all  Psychotic Symptoms Present: insight; affect  Note:   Patient refused vitals. Patient was medication compliant. Patient was isolative and withdrawn. Patient denies any auditory hallucinations but appears internally preoccupied. Patient responses minimally to responses to questions. Patient ate meals. Patient did not shower or groom. Patient's judgement is impaired and insight is poor.

## 2020-02-03 PROCEDURE — 25000132 ZZH RX MED GY IP 250 OP 250 PS 637: Mod: GY | Performed by: PSYCHIATRY & NEUROLOGY

## 2020-02-03 PROCEDURE — 12400000 ZZH R&B MH

## 2020-02-03 PROCEDURE — 25000132 ZZH RX MED GY IP 250 OP 250 PS 637: Mod: GY | Performed by: NURSE PRACTITIONER

## 2020-02-03 RX ADMIN — OMEPRAZOLE 20 MG: 20 CAPSULE, DELAYED RELEASE ORAL at 08:17

## 2020-02-03 RX ADMIN — HALOPERIDOL 5 MG: 5 TABLET ORAL at 08:17

## 2020-02-03 RX ADMIN — RAMELTEON 8 MG: 8 TABLET, FILM COATED ORAL at 20:53

## 2020-02-03 RX ADMIN — HALOPERIDOL 5 MG: 5 TABLET ORAL at 20:53

## 2020-02-03 RX ADMIN — ACETAMINOPHEN 650 MG: 325 TABLET, FILM COATED ORAL at 20:53

## 2020-02-03 RX ADMIN — OLANZAPINE 20 MG: 10 TABLET, FILM COATED ORAL at 20:53

## 2020-02-03 ASSESSMENT — ACTIVITIES OF DAILY LIVING (ADL)
DRESS: PROMPTS
LAUNDRY: UNABLE TO COMPLETE
HYGIENE/GROOMING: PROMPTS
LAUNDRY: WITH SUPERVISION
ORAL_HYGIENE: INDEPENDENT
DRESS: INDEPENDENT
HYGIENE/GROOMING: INDEPENDENT
ORAL_HYGIENE: PROMPTS

## 2020-02-03 NOTE — PROGRESS NOTES
met with patient this morning and had him sign release of information forms for [facility], [facility] and [facility]. Patient is agreeable to having  assist patient in helping him to get back into an IRTS.  faxed referrals to both [facility] ([phone] / [phone] fax) and [facility] ([phone] / [phone] fax) and called the admissions departments to alert them to the referrals.  called and left a message for Nathalie Du in admissions at [facility] ([phone] / [phone] fax).  would like to discuss whether or not [facility] would consider patient for a readmission.  awaiting call back.      received a call back from Mercedes in admissions at [facility]. She stated that they did receive the referral and have reviewed it. She feels that based on the referral patient would be a good candidate for [facility] IRTS. She stated that she will place patient on the wait list for a bed. Currently the wait list is approximately three weeks. She stated that they will check in with  every few days on the status of the wait list. She did note that when she tried to run patient's insurance, his MA was coming up as inactive.  then called and left the [facility] business office a message to see if they can check on the status of patient's MA.      received a voicemail back from Nathalie Du with [facility] requesting that records on patient be faxed to her at the above fax number. Once records are faxed she stated that  can call to further discuss the referral on patient.  faxed records and attempted to call her back. There was no answer so message left for call back.  awaiting call back.      received a call back from Nathalie Du at [facility]. She stated that she does not feel that they could keep patient safe in their  program at this time, based on him wandering off during his past stay at [facility]. She stated that if patient were to successfully complete an IRTS program, that she would be willing to consider him for admission to one of their long term care units in the future. So at this time, they are declining him for admission.      called and left a message for patient's [facility] , Mildred Green ([phone]) to update her on the above.      received a voicemail from the [facility] business office. They contacted [facility] and clarified information on patient so that his MA could be reopened. He stated that as of today, patient's MA has been turned back on again under the same PMI number. His [facility] case number if needed is [id number].  called and left a message for Mercedes in admissions at [facility] to update her on patient's MA status.

## 2020-02-03 NOTE — PLAN OF CARE
BEHAVIORAL TEAM DISCUSSION    Participants: MD, RN, CM, PA, OT   Progress: No change  Anticipated length of stay: Until psychiatrically stable and appropriate community placement found. Patient remains under commitment in [facility] for MI.   Continued Stay Criteria/Rationale: Continued psychiatric stabilization.   Medical/Physical: Per hospitalist consult.   Precautions:   Behavioral Orders   Procedures    Code 1 - Restrict to Unit    Routine Programming     As clinically indicated    Status 15     Every 15 minutes.     Plan: Psychiatrist to continue transition of patient from oral Haldol to Haldol Decanate injection  Rationale for change in precautions or plan: Continued stabilization.

## 2020-02-03 NOTE — PLAN OF CARE
Visible and out of room most of the shift . Ate supper in lounge. Sitting and watching TV in ITC and later watched Superbowl on SDU. Pleasant and smiling sometimes . Quiet, mood stable and flat affect . Mostly appropiate  and minimally social. Thought process impaired . Later in ITC and staring into space and internally preoccupied. Insight poor and impaired judgement . Denies SI. Did not shower . Med compliant and denies SI.

## 2020-02-03 NOTE — PROGRESS NOTES
[facility] Psychiatric Progress Note      Interval History:   Pt seen, team meeting held with , nursing staff, OT, and PA's to review diagnosis and treatment plan.  Staff reported the patient was more visible and out of his room over the weekend.  He did watch TV and was partially interactive.  He was said to have been mostly social and appropriate even though his thought process was still impaired.  He met with his  on Friday and I agreed to have her place him.  He has signed releases of information to facilitate his placement.  He was advised that the plan is to place him on injectable haloperidol to facilitate his compliance but he is not excited about this claiming he would rather take oral medications.     Review of systems:   The Review of Systems completed by Willard Hurley MD is negative other than noted in the HPI     Medications:       haloperidol  5 mg Oral BID     OLANZapine  20 mg Oral At Bedtime     omeprazole  20 mg Oral QAM AC     ramelteon  8 mg Oral At Bedtime     acetaminophen, benztropine, bisacodyl, hydrOXYzine, magnesium hydroxide, OLANZapine **OR** OLANZapine, traZODone    Mental Status Examination:     Appearance:  dressed in hospital scrubs, appeared older than stated age and odoriferous  Attitude:  somewhat cooperative  Eye Contact:  poor   Mood:  better  Affect:  mood congruent, guarded and restricted range  Speech:  clear, coherent  Psychomotor Behavior:  no evidence of tardive dyskinesia, dystonia, or tics and fidgeting  Thought Process:  logical, linear and goal oriented  Associations:  no loose associations  Thought Content:  patient appears to be responding to internal stimuli and appears paranoid but denies the presence of auditory hallucinations.  Insight:  fair  Judgment:  poor  Oriented to:  time, person, and place  Attention Span and Concentration:  limited  Recent and Remote Memory:  limited  Language: Able to read and  "write  Fund of Knowledge: appropriate  Muscle Strength and Tone: normal  Gait and Station: Normal          Labs/Vitals:   /67   Pulse 69   Temp 99  F (37.2  C) (Oral)   Resp 16   Ht 1.727 m (5' 8\")   Wt 80.8 kg (178 lb 3.2 oz)   SpO2 92%   BMI 27.10 kg/m    No results found for this or any previous visit (from the past 24 hour(s)).    Impression:   Hermann Arita is a 34-year-old single father of 2 with history of schizophrenia, who is on a provisional discharge from [facility], where he was civilly committed on account of medication noncompliance and a history of schizophrenia.  He had eloped from [facility] about 2 weeks ago and has been off medications since then.  He will be restarted on prior to admission medications.       DIagnoses:     1.  Schizophrenia, in acute phase.   2.  Cannabis use disorder.   3.  Gastroesophageal reflux disease.          Plan:   1. Written information given on medications. Side effects, risks, benefits reviewed.  2. Medication changes: He will be given Haldol decanoate after tolerating oral Haldol for at least a week.  3. Legal Status: 72-hour hold.  4. Continue hospitalization.      Attestation:  Patient has been seen and evaluated by me, Willard Hurley MD today.    PATIENT ID  Name: Hermann Arita  MRN:[id number]  YOB: 1985  "

## 2020-02-03 NOTE — PLAN OF CARE
Problem: Psychotic Symptoms  Goal: Psychotic Symptoms  Description  Signs and symptoms of listed problems will be absent or manageable.  Anxiety,   Internal preoccupation.   Outcome: No Change  Flowsheets (Taken 2/3/2020 1411)  Psychotic Symptoms Assessed: all  Psychotic Symptoms Present: affect; insight; thought process; psychomotor activity  Note:   Pt presents with a flat affect and calm mood. Pt isolated all shift resting in bed. Pt only left room to use restroom. Pt was minimally social with staff, refusing to respond at times, and declined vitals. Pt denied hearing voices but appeared to be responding to internal stimuli. Pt did eat all of his meals and was med compliant, but refused to shower. Pt denies Si.

## 2020-02-04 PROCEDURE — 12400000 ZZH R&B MH

## 2020-02-04 PROCEDURE — 25000132 ZZH RX MED GY IP 250 OP 250 PS 637: Mod: GY | Performed by: NURSE PRACTITIONER

## 2020-02-04 PROCEDURE — 25000132 ZZH RX MED GY IP 250 OP 250 PS 637: Mod: GY | Performed by: PSYCHIATRY & NEUROLOGY

## 2020-02-04 RX ORDER — HALOPERIDOL DECANOATE 100 MG/ML
25 INJECTION INTRAMUSCULAR
Status: DISCONTINUED | OUTPATIENT
Start: 2020-02-05 | End: 2020-02-18 | Stop reason: HOSPADM

## 2020-02-04 RX ADMIN — OLANZAPINE 20 MG: 10 TABLET, FILM COATED ORAL at 21:42

## 2020-02-04 RX ADMIN — HALOPERIDOL 5 MG: 5 TABLET ORAL at 21:42

## 2020-02-04 RX ADMIN — RAMELTEON 8 MG: 8 TABLET, FILM COATED ORAL at 21:42

## 2020-02-04 RX ADMIN — OMEPRAZOLE 20 MG: 20 CAPSULE, DELAYED RELEASE ORAL at 09:02

## 2020-02-04 RX ADMIN — ACETAMINOPHEN 650 MG: 325 TABLET, FILM COATED ORAL at 13:11

## 2020-02-04 RX ADMIN — HALOPERIDOL 5 MG: 5 TABLET ORAL at 09:03

## 2020-02-04 ASSESSMENT — ACTIVITIES OF DAILY LIVING (ADL)
HYGIENE/GROOMING: INDEPENDENT
ORAL_HYGIENE: INDEPENDENT
HYGIENE/GROOMING: INDEPENDENT
ORAL_HYGIENE: INDEPENDENT
DRESS: STREET CLOTHES
DRESS: STREET CLOTHES
LAUNDRY: WITH SUPERVISION
LAUNDRY: WITH SUPERVISION

## 2020-02-04 ASSESSMENT — MIFFLIN-ST. JEOR: SCORE: 1721.44

## 2020-02-04 NOTE — PROGRESS NOTES
received a voicemail from patient's [facility] , Mildred Green ([phone]) requesting a call back.  attempted to call her back, but she was unavailable.  left another message for call back.

## 2020-02-04 NOTE — PLAN OF CARE
Pt presents with flat blunt affect and calm mood. Pt was social with peers and active in the milieu. He even attended groups and showered without prompts. Pt appears to be acclimating well with no concerns. Med compliant, no SI.

## 2020-02-04 NOTE — PLAN OF CARE
Shift Update: Pt mood is flat. Thought process is impaired. Insight is poor. Pt denies suicidal ideation. Pt more social today going to group and interacting with another peer in ITC. Pt more pleasant today and able to express hmself more clearly. Pt still has episodes where he stands and stares but much less frequently.   Pt still has episodes where he stands and stares but much less frequently.  3-7 Pt remains calm and social,playing games in the SDU with peers.

## 2020-02-04 NOTE — PROGRESS NOTES
[facility] Psychiatric Progress Note      Interval History:   Pt seen, team meeting held with , nursing staff, OT, and PA's to review diagnosis and treatment plan.  Staff report that the patient was accepted at [facility] for placement.  He is now on their wait list for a bed.  Staff report that the patient has remained withdrawn but did come out and attended group/took a shower without prompting last evening.  He continues to sleep for extended periods in the mornings.  He has not expressed any self-harm thoughts or untoward effects from his currently prescribed medications.  To facilitate his compliance he will be placed on Haldol decanoate beginning from tomorrow.   Review of systems:   The Review of Systems completed by Willard Hurley MD is negative other than noted in the HPI     Medications:       haloperidol  5 mg Oral BID     OLANZapine  20 mg Oral At Bedtime     omeprazole  20 mg Oral QAM AC     ramelteon  8 mg Oral At Bedtime     acetaminophen, benztropine, bisacodyl, hydrOXYzine, magnesium hydroxide, OLANZapine **OR** OLANZapine, traZODone    Mental Status Examination:     Appearance:  dressed in hospital scrubs, appeared older than stated age and odoriferous  Attitude:  somewhat cooperative  Eye Contact:  poor   Mood:  better  Affect:  mood congruent, guarded and restricted range  Speech:  clear, coherent  Psychomotor Behavior:  no evidence of tardive dyskinesia, dystonia, or tics and fidgeting  Thought Process:  logical, linear and goal oriented  Associations:  no loose associations  Thought Content:  no evidence of suicidal ideation or homicidal ideation, patient appears to be responding to internal stimuli and appears paranoid but denies the presence of auditory hallucinations.  Insight:  fair  Judgment:  poor  Oriented to:  time, person, and place  Attention Span and Concentration:  limited  Recent and Remote Memory:  limited  Language:  "Able to read and write  Fund of Knowledge: appropriate  Muscle Strength and Tone: normal  Gait and Station: Normal          Labs/Vitals:   /65   Pulse 70   Temp 98.6  F (37  C) (Oral)   Resp 16   Ht 1.727 m (5' 8\")   Wt 81.2 kg (179 lb)   SpO2 96%   BMI 27.22 kg/m    No results found for this or any previous visit (from the past 24 hour(s)).    Impression:   Hermann Arita is a 34-year-old single father of 2 with history of schizophrenia, who is on a provisional discharge from [facility], where he was civilly committed on account of medication noncompliance and a history of schizophrenia.  He had eloped from [facility] about 2 weeks ago and has been off medications since then.  He will be restarted on prior to admission medications.       DIagnoses:     1.  Schizophrenia, in acute phase.   2.  Cannabis use disorder.   3.  Gastroesophageal reflux disease.          Plan:   1. Written information given on medications. Side effects, risks, benefits reviewed.  2. Medication changes: Haldol decanoate 25 mg IM on 2/5/2020.  3. Legal Status: 72-hour hold.  4. Continue hospitalization.      Attestation:  Patient has been seen and evaluated by me, Willard Hurley MD today.    PATIENT ID  Name: Hermann Arita  MRN:[id number]  YOB: 1985  "

## 2020-02-05 PROCEDURE — 25000132 ZZH RX MED GY IP 250 OP 250 PS 637: Mod: GY | Performed by: PSYCHIATRY & NEUROLOGY

## 2020-02-05 PROCEDURE — 25000132 ZZH RX MED GY IP 250 OP 250 PS 637: Mod: GY | Performed by: NURSE PRACTITIONER

## 2020-02-05 PROCEDURE — 25000128 H RX IP 250 OP 636: Performed by: PSYCHIATRY & NEUROLOGY

## 2020-02-05 PROCEDURE — 12400000 ZZH R&B MH

## 2020-02-05 PROCEDURE — G0177 OPPS/PHP; TRAIN & EDUC SERV: HCPCS

## 2020-02-05 RX ADMIN — ACETAMINOPHEN 650 MG: 325 TABLET, FILM COATED ORAL at 23:52

## 2020-02-05 RX ADMIN — RAMELTEON 8 MG: 8 TABLET, FILM COATED ORAL at 22:19

## 2020-02-05 RX ADMIN — OMEPRAZOLE 20 MG: 20 CAPSULE, DELAYED RELEASE ORAL at 08:27

## 2020-02-05 RX ADMIN — HALOPERIDOL DECANOATE 25 MG: 100 INJECTION INTRAMUSCULAR at 16:15

## 2020-02-05 RX ADMIN — HALOPERIDOL 5 MG: 5 TABLET ORAL at 22:19

## 2020-02-05 RX ADMIN — TRAZODONE HYDROCHLORIDE 50 MG: 50 TABLET ORAL at 23:52

## 2020-02-05 RX ADMIN — OLANZAPINE 20 MG: 10 TABLET, FILM COATED ORAL at 22:19

## 2020-02-05 RX ADMIN — HALOPERIDOL 5 MG: 5 TABLET ORAL at 08:27

## 2020-02-05 ASSESSMENT — ACTIVITIES OF DAILY LIVING (ADL)
ORAL_HYGIENE: INDEPENDENT
LAUNDRY: WITH SUPERVISION
HYGIENE/GROOMING: INDEPENDENT
DRESS: INDEPENDENT

## 2020-02-05 NOTE — PROGRESS NOTES
met with patient this morning to update him regarding placement.  reviewed with him that he is currently on the wait list for a bed at [facility]. He is in agreement with this plan.      tried calling patient's [facility] , Mildred Green ([phone]), again today to update her. Message left for call back.     [facility] and  reviewed with her that patient is on the wait list for a bed at [facility]. She is in agreement with this plan as well.  will update her once there is a date in place for discharge.

## 2020-02-05 NOTE — PROGRESS NOTES
[facility] Psychiatric Progress Note      Interval History:   Pt seen, team meeting held with , nursing staff, OT, and PA's to review diagnosis and treatment plan.  His conversations are more spontaneous. Staff reported the patient is doing somewhat better but he is refusing to take Haldol decanoate.  It was explained to him that this is required to facilitate his compliance on account of his recent disappearance from placement and current need to get him placed. He has been taking showers and attending group therapy but he still isolates. Denies suicidal or homicidal ideation. Tolerating medications well without significant side effects.     Review of systems:   The Review of Systems completed by Willard Hurley MD is negative other than noted in the HPI     Medications:       haloperidol  5 mg Oral BID     haloperidol decanoate  25 mg Intramuscular Q28 Days     OLANZapine  20 mg Oral At Bedtime     omeprazole  20 mg Oral QAM AC     ramelteon  8 mg Oral At Bedtime     acetaminophen, benztropine, bisacodyl, hydrOXYzine, magnesium hydroxide, OLANZapine **OR** OLANZapine, traZODone    Mental Status Examination:     Appearance:  dressed in hospital scrubs, appeared older than stated age and odoriferous  Attitude:  somewhat cooperative  Eye Contact:  fair  Mood:  better  Affect:  mood congruent, guarded and restricted range  Speech:  clear, coherent  Psychomotor Behavior:  no evidence of tardive dyskinesia, dystonia, or tics and fidgeting  Thought Process:  logical, linear and goal oriented  Associations:  no loose associations  Thought Content:  no evidence of suicidal ideation or homicidal ideation, patient appears to be responding to internal stimuli and appears paranoid but denies the presence of auditory hallucinations.  Insight:  fair  Judgment:  poor  Oriented to:  time, person, and place  Attention Span and Concentration:  limited  Recent and Remote Memory:   "limited  Language: Able to read and write  Fund of Knowledge: appropriate  Muscle Strength and Tone: normal  Gait and Station: Normal          Labs/Vitals:   /72   Pulse 70   Temp 99.5  F (37.5  C) (Oral)   Resp 16   Ht 1.727 m (5' 8\")   Wt 81.2 kg (179 lb)   SpO2 98%   BMI 27.22 kg/m    No results found for this or any previous visit (from the past 24 hour(s)).    Impression:   Hermann Arita is a 34-year-old single father of 2 with history of schizophrenia, who is on a provisional discharge from [facility], where he was civilly committed on account of medication noncompliance and a history of schizophrenia.  He had eloped from [facility] about 2 weeks ago and has been off medications since then.  He will be restarted on prior to admission medications.       DIagnoses:     1.  Schizophrenia, in acute phase.   2.  Cannabis use disorder.   3.  Gastroesophageal reflux disease.          Plan:   1. Written information given on medications. Side effects, risks, benefits reviewed.  2. Medication changes: Haldol decanoate 25 mg IM today. He cannot refuse as he is under [place] order.  3. Legal Status: 72-hour hold.  4. Continue hospitalization.      Attestation:  Patient has been seen and evaluated by me, Willard Hurley MD today.    PATIENT ID  Name: Hermann Arita  MRN:[id number]  YOB: 1985  "

## 2020-02-05 NOTE — PROGRESS NOTES
02/05/20 1400   General Information   Date Initially Attended OT 02/05/20     Pt attended 1 of 2 OT groups today. Pt transitioned to PM mindfulness group IND and participated in a mindfulness group focusing on reduction of anxiety, improvement of mood, and increase in concentration. The mindfulness practice was offered via supportive approaches including aromatherapy and guided visualization. More observation needed to complete initial evaluation at this time. Pt will continue to benefit from OT intervention to address implementation of positive functional coping skills, role performance, and community reintegration.

## 2020-02-05 NOTE — PLAN OF CARE
Problem: Psychotic Symptoms  Goal: Psychotic Symptoms  Description  Signs and symptoms of listed problems will be absent or manageable.  Anxiety,   Internal preoccupation.   Outcome: Improving  Flowsheets (Taken 2/5/2020 1404)  Psychotic Symptoms Assessed: all  Psychotic Symptoms Present: insight; thought process  Note:   Pt presents with a full range affect and calm mood. Pt spent a good portion of the morning resting in his room napping. Pt did attend some unit programming and participated appropriately. Pt is withdrawn from most peers and staff but made random noises throughout the day. Pt showered, ate all of his food and was med compliant.

## 2020-02-06 PROCEDURE — 25000132 ZZH RX MED GY IP 250 OP 250 PS 637: Mod: GY | Performed by: PSYCHIATRY & NEUROLOGY

## 2020-02-06 PROCEDURE — 12400000 ZZH R&B MH

## 2020-02-06 PROCEDURE — 25000132 ZZH RX MED GY IP 250 OP 250 PS 637: Mod: GY | Performed by: NURSE PRACTITIONER

## 2020-02-06 RX ADMIN — HALOPERIDOL 5 MG: 5 TABLET ORAL at 20:13

## 2020-02-06 RX ADMIN — OLANZAPINE 10 MG: 10 TABLET, FILM COATED ORAL at 03:35

## 2020-02-06 RX ADMIN — OMEPRAZOLE 20 MG: 20 CAPSULE, DELAYED RELEASE ORAL at 06:49

## 2020-02-06 RX ADMIN — HALOPERIDOL 5 MG: 5 TABLET ORAL at 08:59

## 2020-02-06 RX ADMIN — TRAZODONE HYDROCHLORIDE 50 MG: 50 TABLET ORAL at 02:34

## 2020-02-06 RX ADMIN — RAMELTEON 8 MG: 8 TABLET, FILM COATED ORAL at 20:13

## 2020-02-06 RX ADMIN — ACETAMINOPHEN 650 MG: 325 TABLET, FILM COATED ORAL at 21:25

## 2020-02-06 RX ADMIN — OLANZAPINE 20 MG: 10 TABLET, FILM COATED ORAL at 20:13

## 2020-02-06 ASSESSMENT — ACTIVITIES OF DAILY LIVING (ADL)
HYGIENE/GROOMING: PROMPTS
HYGIENE/GROOMING: PROMPTS
ORAL_HYGIENE: PROMPTS
LAUNDRY: WITH SUPERVISION
LAUNDRY: WITH SUPERVISION
DRESS: SCRUBS (BEHAVIORAL HEALTH)
DRESS: SCRUBS (BEHAVIORAL HEALTH)
ORAL_HYGIENE: PROMPTS
LAUNDRY: WITH SUPERVISION

## 2020-02-06 NOTE — PLAN OF CARE
Problem: Adult Behavioral Health Plan of Care  Goal: Patient-Specific Goal (Individualization)  2/6/2020 1422 by Jim Elizondo RN  Outcome: No Change  Note:   Pt has been isolative to his room. Pt has been feeling tired and anxious. Pt states that he did not sleep well last night and has been feeling tired since he did not sleep. Pt was encouraged to utilize the Zyprexa before he goes to sleep to help him get some rest. Pt denies any hallucinations or SI. Pt is disheveled and encouraged to do his ADLs. Pt has been cooperative with staff.

## 2020-02-06 NOTE — PROGRESS NOTES
[facility] Psychiatric Progress Note      Interval History:   Pt seen, team meeting held with , nursing staff, OT, and PA's to review diagnosis and treatment plan.  Staff report that the patient has been doing better and has also been attending group sessions.  He has been more appropriate and spontaneous in communication.  He has been cordial with his roommate.  He does not endorse the presence of auditory or visual hallucinations.  He is tolerating medications without any untoward effects.  He denies experiencing any self-harm thoughts plans or intent.  He continues to await placement at [facility].     Review of systems:   The Review of Systems completed by Willard Hurley MD is negative other than noted in the HPI     Medications:       haloperidol  5 mg Oral BID     haloperidol decanoate  25 mg Intramuscular Q28 Days     OLANZapine  20 mg Oral At Bedtime     omeprazole  20 mg Oral QAM AC     ramelteon  8 mg Oral At Bedtime     acetaminophen, benztropine, bisacodyl, hydrOXYzine, magnesium hydroxide, OLANZapine **OR** OLANZapine, traZODone    Mental Status Examination:     Appearance:  dressed in hospital scrubs, appeared older than stated age and slightly unkempt  Attitude:  somewhat cooperative  Eye Contact:  fair  Mood:  better  Affect:  mood congruent, guarded and restricted range  Speech:  clear, coherent  Psychomotor Behavior:  no evidence of tardive dyskinesia, dystonia, or tics and fidgeting  Thought Process:  logical, linear and goal oriented  Associations:  no loose associations  Thought Content:  no evidence of suicidal ideation or homicidal ideation, patient appears to be responding to internal stimuli and appears paranoid but denies the presence of auditory hallucinations.  Insight:  fair  Judgment:  poor  Oriented to:  time, person, and place  Attention Span and Concentration:  limited  Recent and Remote Memory:  limited  Language: Able to read  "and write  Fund of Knowledge: appropriate  Muscle Strength and Tone: normal  Gait and Station: Normal          Labs/Vitals:   BP (!) 81/59   Pulse 89   Temp 97.5  F (36.4  C) (Oral)   Resp 16   Ht 1.727 m (5' 8\")   Wt 81.2 kg (179 lb)   SpO2 97%   BMI 27.22 kg/m    No results found for this or any previous visit (from the past 24 hour(s)).    Impression:   Hermann Arita is a 34-year-old single father of 2 with history of schizophrenia, who is on a provisional discharge from [facility], where he was civilly committed on account of medication noncompliance and a history of schizophrenia.  He had eloped from [facility] about 2 weeks ago and has been off medications since then.  He will be restarted on prior to admission medications.       DIagnoses:     1.  Schizophrenia, in acute phase.   2.  Cannabis use disorder.   3.  Gastroesophageal reflux disease.          Plan:   1. Written information given on medications. Side effects, risks, benefits reviewed.  2. Medication changes: None  3. Legal Status: 72-hour hold.  4. Continue hospitalization.      Attestation:  Patient has been seen and evaluated by me, Willard Hurley MD today.    PATIENT ID  Name: Hermann Arita  MRN:[id number]  YOB: 1985  "

## 2020-02-06 NOTE — PLAN OF CARE
Pt presents with a calm mood and full range affect. Behavior is appropriate with peers and staff, social on the unit. Mild paranoia when asked if he wanted to rescind his restrict all status. Feels as though the medication is helping. Denies any SI.

## 2020-02-06 NOTE — PLAN OF CARE
Pt presents with full range affect and elated mood. Pt enjoys teasing his peers and asking intrusive questions to staff. Pt slept through the first few groups. Pt lacks boundaries and may require redirection at times when in milieu. Med compliant, no SI stated.

## 2020-02-07 PROCEDURE — 12400000 ZZH R&B MH

## 2020-02-07 PROCEDURE — 25000132 ZZH RX MED GY IP 250 OP 250 PS 637: Mod: GY | Performed by: PSYCHIATRY & NEUROLOGY

## 2020-02-07 PROCEDURE — 25000132 ZZH RX MED GY IP 250 OP 250 PS 637: Mod: GY | Performed by: NURSE PRACTITIONER

## 2020-02-07 RX ORDER — HALOPERIDOL 5 MG/1
5 TABLET ORAL DAILY
Status: COMPLETED | OUTPATIENT
Start: 2020-02-08 | End: 2020-02-10

## 2020-02-07 RX ADMIN — OMEPRAZOLE 20 MG: 20 CAPSULE, DELAYED RELEASE ORAL at 06:40

## 2020-02-07 RX ADMIN — ACETAMINOPHEN 650 MG: 325 TABLET, FILM COATED ORAL at 17:52

## 2020-02-07 RX ADMIN — HALOPERIDOL 5 MG: 5 TABLET ORAL at 08:44

## 2020-02-07 RX ADMIN — TRAZODONE HYDROCHLORIDE 50 MG: 50 TABLET ORAL at 20:49

## 2020-02-07 RX ADMIN — OLANZAPINE 20 MG: 10 TABLET, FILM COATED ORAL at 20:50

## 2020-02-07 RX ADMIN — RAMELTEON 8 MG: 8 TABLET, FILM COATED ORAL at 20:49

## 2020-02-07 RX ADMIN — ACETAMINOPHEN 650 MG: 325 TABLET, FILM COATED ORAL at 12:20

## 2020-02-07 ASSESSMENT — ACTIVITIES OF DAILY LIVING (ADL)
LAUNDRY: WITH SUPERVISION
ORAL_HYGIENE: PROMPTS
DRESS: PROMPTS
HYGIENE/GROOMING: PROMPTS
HYGIENE/GROOMING: PROMPTS
DRESS: PROMPTS
LAUNDRY: WITH SUPERVISION
ORAL_HYGIENE: PROMPTS

## 2020-02-07 NOTE — PLAN OF CARE
"Pt presents with full range affect and elated mood. Pt continued to make untimely and off topic comments during group. Pt is social with peers and is \"one of the guys\" when in the milieu. No further concerns at this time - med compliant, no SI stated.  "

## 2020-02-07 NOTE — PLAN OF CARE
Problem: Adult Behavioral Health Plan of Care  Goal: Patient-Specific Goal (Individualization)  Outcome: No Change  Note:   Pt is present on the unit has been out in the lounge watching TV with peers. Pt is not overly social  with peers but does interact upon approach. Pt is flat thought process is intact. Pt denies any SI or hallucinations. Pt is feeling good and got some good sleep. Pt is waiting for placement to [facility]. Nursing to continue to monitor.

## 2020-02-07 NOTE — PROGRESS NOTES
"[facility] Psychiatric Progress Note      Interval History:   Pt seen, team meeting held with , nursing staff, OT, and PA's to review diagnosis and treatment plan. Patient reports he is doing better and staff report that he has been more appropriate and has been attending groups. Tolerating medications well without significant side effects. Denies suicidal or homicidal ideation.     Review of systems:   The Review of Systems completed by Willard Hurley MD is negative other than noted in the HPI     Medications:       haloperidol  5 mg Oral BID     haloperidol decanoate  25 mg Intramuscular Q28 Days     OLANZapine  20 mg Oral At Bedtime     omeprazole  20 mg Oral QAM AC     ramelteon  8 mg Oral At Bedtime     acetaminophen, benztropine, bisacodyl, hydrOXYzine, magnesium hydroxide, OLANZapine **OR** OLANZapine, traZODone    Mental Status Examination:     Appearance:  dressed in hospital scrubs, appeared older than stated age and slightly unkempt  Attitude:  somewhat cooperative  Eye Contact:  fair  Mood:  better  Affect:  mood congruent, guarded and restricted range  Speech:  clear, coherent  Psychomotor Behavior:  no evidence of tardive dyskinesia, dystonia, or tics and fidgeting  Thought Process:  logical, linear and goal oriented  Associations:  no loose associations  Thought Content:  no evidence of suicidal ideation or homicidal ideation, patient appears to be responding to internal stimuli and appears paranoid but denies the presence of auditory hallucinations.  Insight:  fair  Judgment:  poor  Oriented to:  time, person, and place  Attention Span and Concentration:  limited  Recent and Remote Memory:  limited  Language: Able to read and write  Fund of Knowledge: appropriate  Muscle Strength and Tone: normal  Gait and Station: Normal          Labs/Vitals:   /76   Pulse 65   Temp 99  F (37.2  C) (Oral)   Resp 16   Ht 1.727 m (5' 8\")   Wt 81.2 kg (179 lb)   " SpO2 97%   BMI 27.22 kg/m    No results found for this or any previous visit (from the past 24 hour(s)).    Impression:   Hermann Arita is a 34-year-old single father of 2 with history of schizophrenia, who is on a provisional discharge from [facility], where he was civilly committed on account of medication noncompliance and a history of schizophrenia.  He had eloped from [facility] about 2 weeks ago and has been off medications since then.  He will be restarted on prior to admission medications.       DIagnoses:     1.  Schizophrenia, in acute phase.   2.  Cannabis use disorder.   3.  Gastroesophageal reflux disease.          Plan:   1. Written information given on medications. Side effects, risks, benefits reviewed.  2. Medication changes: Decrease haldol to 5 mg daily.  3. Legal Status: 72-hour hold.  4. Continue hospitalization.      Attestation:  Patient has been seen and evaluated by me, Willard Hurley MD today.    PATIENT ID  Name: Hermann Arita  MRN:[id number]  YOB: 1985

## 2020-02-07 NOTE — PROGRESS NOTES
called and spoke with Mercedes at [facility] ([phone] / [phone] fax) to see if patient has moved up the wait list at all. She stated that he is about a third of the way up the list. She is still anticipating a 2-3 week wait for an open bed.  will check in with her next week again.  updated patient on the above.

## 2020-02-08 PROCEDURE — 25000132 ZZH RX MED GY IP 250 OP 250 PS 637: Mod: GY | Performed by: PSYCHIATRY & NEUROLOGY

## 2020-02-08 PROCEDURE — 25000132 ZZH RX MED GY IP 250 OP 250 PS 637: Mod: GY | Performed by: NURSE PRACTITIONER

## 2020-02-08 PROCEDURE — 12400000 ZZH R&B MH

## 2020-02-08 RX ADMIN — ACETAMINOPHEN 650 MG: 325 TABLET, FILM COATED ORAL at 20:19

## 2020-02-08 RX ADMIN — OLANZAPINE 20 MG: 10 TABLET, FILM COATED ORAL at 20:20

## 2020-02-08 RX ADMIN — HALOPERIDOL 5 MG: 5 TABLET ORAL at 09:03

## 2020-02-08 RX ADMIN — ACETAMINOPHEN 650 MG: 325 TABLET, FILM COATED ORAL at 14:17

## 2020-02-08 RX ADMIN — OMEPRAZOLE 20 MG: 20 CAPSULE, DELAYED RELEASE ORAL at 09:03

## 2020-02-08 RX ADMIN — RAMELTEON 8 MG: 8 TABLET, FILM COATED ORAL at 20:20

## 2020-02-08 ASSESSMENT — ACTIVITIES OF DAILY LIVING (ADL)
DRESS: INDEPENDENT
ORAL_HYGIENE: PROMPTS
HYGIENE/GROOMING: PROMPTS
LAUNDRY: WITH SUPERVISION

## 2020-02-08 NOTE — PLAN OF CARE
Pt presents with full range affect and anxious mood. Pt is friendly and social with peers and staff; sometimes lacks boundaries. Pt attended unit programming with minimal participation. Insight fair; judgment poor. Med compliant and denies SI.

## 2020-02-08 NOTE — PLAN OF CARE
Pt was visible on the unit this shift. He attended unit programming and socialized with peers. He was pleasant upon approach and interaction. Pt states his mood is good, hoping to find placement for discharge. He continues to appear disheveled with neglected grooming. He presents with full range affect and calm mood.

## 2020-02-09 PROCEDURE — 25000132 ZZH RX MED GY IP 250 OP 250 PS 637: Mod: GY | Performed by: NURSE PRACTITIONER

## 2020-02-09 PROCEDURE — 25000132 ZZH RX MED GY IP 250 OP 250 PS 637: Mod: GY | Performed by: PSYCHIATRY & NEUROLOGY

## 2020-02-09 PROCEDURE — 12400000 ZZH R&B MH

## 2020-02-09 RX ADMIN — HALOPERIDOL 5 MG: 5 TABLET ORAL at 07:43

## 2020-02-09 RX ADMIN — ACETAMINOPHEN 650 MG: 325 TABLET, FILM COATED ORAL at 21:03

## 2020-02-09 RX ADMIN — OMEPRAZOLE 20 MG: 20 CAPSULE, DELAYED RELEASE ORAL at 07:43

## 2020-02-09 RX ADMIN — TRAZODONE HYDROCHLORIDE 50 MG: 50 TABLET ORAL at 21:59

## 2020-02-09 RX ADMIN — ACETAMINOPHEN 650 MG: 325 TABLET, FILM COATED ORAL at 07:43

## 2020-02-09 RX ADMIN — OLANZAPINE 20 MG: 10 TABLET, FILM COATED ORAL at 21:03

## 2020-02-09 RX ADMIN — RAMELTEON 8 MG: 8 TABLET, FILM COATED ORAL at 21:03

## 2020-02-09 RX ADMIN — ACETAMINOPHEN 650 MG: 325 TABLET, FILM COATED ORAL at 14:40

## 2020-02-09 NOTE — PLAN OF CARE
Shift Update: Pt mood is calm, affect is tense. Thought process is impaired. Insight is poor. Pt denies suicidal ideation. Pt spent the shift attending groups, playing card games and watching TV with peers. Pt reported that he felt a little irritable this morning but has been feeling better after a nap. Pt was med and food compliant.

## 2020-02-09 NOTE — PLAN OF CARE
Shift Update: Pt mood is calm, affect is flat, brightens upon approach. Thought process is impaired. Insight is appropriate. Pt denies suicidal ideation, was med and food compliant, attended groups. Spent time playing cards with peers and pacing the hallway. Pt reported that he has been feeling happy and good today but did not provide any further information

## 2020-02-10 PROCEDURE — 25000132 ZZH RX MED GY IP 250 OP 250 PS 637: Mod: GY | Performed by: NURSE PRACTITIONER

## 2020-02-10 PROCEDURE — 25000132 ZZH RX MED GY IP 250 OP 250 PS 637: Mod: GY | Performed by: PSYCHIATRY & NEUROLOGY

## 2020-02-10 PROCEDURE — 12400000 ZZH R&B MH

## 2020-02-10 RX ORDER — IBUPROFEN 400 MG/1
400-800 TABLET, FILM COATED ORAL EVERY 6 HOURS PRN
Status: DISCONTINUED | OUTPATIENT
Start: 2020-02-10 | End: 2020-02-18 | Stop reason: HOSPADM

## 2020-02-10 RX ORDER — GABAPENTIN 100 MG/1
100 CAPSULE ORAL 2 TIMES DAILY
Status: DISCONTINUED | OUTPATIENT
Start: 2020-02-10 | End: 2020-02-18 | Stop reason: HOSPADM

## 2020-02-10 RX ADMIN — OMEPRAZOLE 20 MG: 20 CAPSULE, DELAYED RELEASE ORAL at 09:33

## 2020-02-10 RX ADMIN — HALOPERIDOL 5 MG: 5 TABLET ORAL at 09:33

## 2020-02-10 RX ADMIN — RAMELTEON 8 MG: 8 TABLET, FILM COATED ORAL at 21:06

## 2020-02-10 RX ADMIN — OLANZAPINE 20 MG: 10 TABLET, FILM COATED ORAL at 21:06

## 2020-02-10 RX ADMIN — ACETAMINOPHEN 650 MG: 325 TABLET, FILM COATED ORAL at 12:04

## 2020-02-10 RX ADMIN — IBUPROFEN 400 MG: 400 TABLET ORAL at 19:44

## 2020-02-10 RX ADMIN — TRAZODONE HYDROCHLORIDE 50 MG: 50 TABLET ORAL at 21:07

## 2020-02-10 RX ADMIN — GABAPENTIN 100 MG: 100 CAPSULE ORAL at 21:06

## 2020-02-10 ASSESSMENT — ACTIVITIES OF DAILY LIVING (ADL)
LAUNDRY: UNABLE TO COMPLETE
DRESS: STREET CLOTHES
ORAL_HYGIENE: INDEPENDENT
HYGIENE/GROOMING: PROMPTS

## 2020-02-10 NOTE — PROGRESS NOTES
met briefly with patient today at his request. He stated that he still has belongings at [facility] so wanted to call them. Phone number for [facility] and his county  given to him this afternoon. He attended only the last few minutes of [employer] today, but did participate appropriately while he was there.

## 2020-02-10 NOTE — PROGRESS NOTES
[facility] Psychiatric Progress Note      Interval History:   Pt seen, team meeting held with , nursing staff, OT, and PA's to review diagnosis and treatment plan. Patient reports he is doing better and staff report that he has been more appropriate and has been attending groups. However, he reports that he has been experiencing pain in his feet. He denies any injury or swelling. He reports some numbness in his toes. He does not have a history of DM or neuropathy.  His blood glucose  88 mg/L on 1/28/2020 and HbA1c was 5.1%.  He tells me that he has been using Tylenol and it has not been beneficial and so he was offered gabapentin and ibuprofen.  Tolerating medications well without significant side effects. Denies suicidal or homicidal ideation.     Review of systems:   The Review of Systems completed by Willard Hurley MD is negative other than noted in the HPI     Medications:       haloperidol decanoate  25 mg Intramuscular Q28 Days     OLANZapine  20 mg Oral At Bedtime     omeprazole  20 mg Oral QAM AC     ramelteon  8 mg Oral At Bedtime     acetaminophen, benztropine, bisacodyl, hydrOXYzine, magnesium hydroxide, OLANZapine **OR** OLANZapine, traZODone    Mental Status Examination:     Appearance:  dressed in hospital scrubs, appeared older than stated age and slightly unkempt  Attitude:  somewhat cooperative  Eye Contact:  fair  Mood:  better  Affect:  mood congruent, guarded and restricted range  Speech:  clear, coherent  Psychomotor Behavior:  no evidence of tardive dyskinesia, dystonia, or tics and fidgeting  Thought Process:  logical, linear and goal oriented  Associations:  no loose associations  Thought Content:  no evidence of suicidal ideation or homicidal ideation, patient appears to be responding to internal stimuli and appears paranoid but denies the presence of auditory hallucinations.  Insight:  fair  Judgment:  poor  Oriented to:  time, person, and  "place  Attention Span and Concentration:  limited  Recent and Remote Memory:  limited  Language: Able to read and write  Fund of Knowledge: appropriate  Muscle Strength and Tone: normal  Gait and Station: Normal          Labs/Vitals:   /65   Pulse 53   Temp 97.4  F (36.3  C) (Oral)   Resp 16   Ht 1.727 m (5' 8\")   Wt 81.2 kg (179 lb)   SpO2 97%   BMI 27.22 kg/m    No results found for this or any previous visit (from the past 24 hour(s)).    Impression:   Hermann Arita is a 34-year-old single father of 2 with history of schizophrenia, who is on a provisional discharge from [facility], where he was civilly committed on account of medication noncompliance and a history of schizophrenia.  He had eloped from [facility] about 2 weeks ago and has been off medications since then.  He will be restarted on prior to admission medications.       DIagnoses:     1.  Schizophrenia, in acute phase.   2.  Cannabis use disorder.   3.  Gastroesophageal reflux disease.          Plan:   1. Written information given on medications. Side effects, risks, benefits reviewed.  2. Medication changes: Offer gabapentin 100 mg twice daily and ibuprofen 400 to 800 mg every 6 hours as needed for bilateral feet pain.  3. Legal Status: 72-hour hold  at 2020 at 1:59 PM.  Voluntary.  4. Continue hospitalization.      Attestation:  Patient has been seen and evaluated by me, Willard Hurley MD today.    PATIENT ID  Name: Hermann Arita  MRN:[id number]  YOB: 1985  "

## 2020-02-10 NOTE — PLAN OF CARE
BEHAVIORAL TEAM DISCUSSION    Participants: MD, RN, CM, PA, OT   Progress: No change  Anticipated length of stay: Until placed in IRTS facility.   Continued Stay Criteria/Rationale: Patient remains under civil commitment and requires a safe disposition in the community for discharge.  Medical/Physical: Per hospitalist consult  Precautions:   Behavioral Orders   Procedures    Code 1 - Restrict to Unit    Routine Programming     As clinically indicated    Status 15     Every 15 minutes.     Plan: Awaiting placement at [facility]. Medication changes: Offer gabapentin 100 mg twice daily and ibuprofen 400 to 800 mg every 6 hours as needed for bilateral feet pain.    Rationale for change in precautions or plan: Continued stabilization.

## 2020-02-10 NOTE — PLAN OF CARE
Problem: Psychotic Symptoms  Goal: Psychotic Symptoms  Description  Signs and symptoms of listed problems will be absent or manageable.  Anxiety,   Internal preoccupation.   Flowsheets (Taken 2/10/2020 1433)  Psychotic Symptoms Assessed: all  Psychotic Symptoms Present: insight;thought process  Note:   Patient's vitals were WNL. Patient's Psychiatric Provider medication changes were to offer gabapentin 100 mg twice daily and ibuprofen 400 to 800 mg every 6 hours as needed for bilateral feet pain. Patient was medication compliant and took no psychiatric PRNs. Patient was mostly isolative and withdrawn. Patient did not attend groups. Patient appears anxious but Patient denies being anxious. Patient exhibits ticks. Patient appears internally preoccupied but is not responding to internal stimuli.

## 2020-02-11 PROCEDURE — 25000132 ZZH RX MED GY IP 250 OP 250 PS 637: Mod: GY | Performed by: PSYCHIATRY & NEUROLOGY

## 2020-02-11 PROCEDURE — 25000132 ZZH RX MED GY IP 250 OP 250 PS 637: Mod: GY | Performed by: NURSE PRACTITIONER

## 2020-02-11 PROCEDURE — 12400000 ZZH R&B MH

## 2020-02-11 RX ADMIN — IBUPROFEN 800 MG: 400 TABLET ORAL at 08:11

## 2020-02-11 RX ADMIN — TRAZODONE HYDROCHLORIDE 50 MG: 50 TABLET ORAL at 20:50

## 2020-02-11 RX ADMIN — OLANZAPINE 20 MG: 10 TABLET, FILM COATED ORAL at 20:49

## 2020-02-11 RX ADMIN — RAMELTEON 8 MG: 8 TABLET, FILM COATED ORAL at 20:50

## 2020-02-11 RX ADMIN — OMEPRAZOLE 20 MG: 20 CAPSULE, DELAYED RELEASE ORAL at 08:11

## 2020-02-11 RX ADMIN — GABAPENTIN 100 MG: 100 CAPSULE ORAL at 20:49

## 2020-02-11 RX ADMIN — GABAPENTIN 100 MG: 100 CAPSULE ORAL at 08:11

## 2020-02-11 RX ADMIN — IBUPROFEN 400 MG: 400 TABLET ORAL at 12:57

## 2020-02-11 RX ADMIN — IBUPROFEN 800 MG: 400 TABLET ORAL at 20:50

## 2020-02-11 ASSESSMENT — ACTIVITIES OF DAILY LIVING (ADL)
DRESS: INDEPENDENT
LAUNDRY: WITH SUPERVISION
ORAL_HYGIENE: INDEPENDENT
HYGIENE/GROOMING: INDEPENDENT

## 2020-02-11 ASSESSMENT — MIFFLIN-ST. JEOR: SCORE: 1790.39

## 2020-02-11 NOTE — PLAN OF CARE
Pt displays flat affect, blunted and joking with staff and peers at times. Present in lounge, playing cards with peers. Denies VAH. Insight appears limited. Pt c/o bilateral foot pain, ibuprofen given. Writer visually inspected feet, big toes very sensitive to firm palpation, +2 pulses, pt reports numbness.    Problem: Adult Inpatient Plan of Care  Goal: Absence of Hospital-Acquired Illness or Injury  Outcome: Improving     Problem: Adult Inpatient Plan of Care  Goal: Optimal Comfort and Wellbeing  Outcome: Improving     Problem: Adult Behavioral Health Plan of Care  Goal: Adheres to Safety Considerations for Self and Others  Outcome: Improving     Problem: Adult Behavioral Health Plan of Care  Goal: Develops/Participates in Therapeutic [place] to Support Successful Transition  Outcome: Improving     Problem: Suicidal Behavior  Goal: Suicidal Behavior is Absent or Managed  Outcome: Improving     Problem: Psychotic Symptoms  Goal: Psychotic Symptoms  Description  Signs and symptoms of listed problems will be absent or manageable.  Anxiety,   Internal preoccupation.   Outcome: Improving     Problem: Psychotic Symptoms  Goal: Social and Therapeutic (Psychotic Symptoms)  Description  Signs and symptoms of listed problems will be absent or manageable.  Outcome: Improving

## 2020-02-11 NOTE — PROGRESS NOTES
[facility] Psychiatric Progress Note      Interval History:   Pt seen, team meeting held with , nursing staff, OT, and PA's to review diagnosis and treatment plan.  Staff reported the patient is doing okay but he has been egging on 1 of his peers who reportedly is extremely labile and irritable.  However he himself denies experiencing any acute distress and is looking forward to transitioning to [facility].  He does not endorse any untoward effects from his medications.  He states that he pain in his feet is subsiding.  He denies experiencing any auditory or visual hallucinations and reports future orientation.     Review of systems:   The Review of Systems completed by Willard Hurley MD is negative other than noted in the HPI     Medications:       gabapentin  100 mg Oral BID     haloperidol decanoate  25 mg Intramuscular Q28 Days     OLANZapine  20 mg Oral At Bedtime     omeprazole  20 mg Oral QAM AC     ramelteon  8 mg Oral At Bedtime     benztropine, bisacodyl, hydrOXYzine, ibuprofen, magnesium hydroxide, OLANZapine **OR** OLANZapine, traZODone    Mental Status Examination:     Appearance:  dressed in hospital scrubs, appeared older than stated age and slightly unkempt  Attitude:  somewhat cooperative  Eye Contact:  fair  Mood:  better  Affect:  mood congruent, guarded and restricted range  Speech:  clear, coherent  Psychomotor Behavior:  no evidence of tardive dyskinesia, dystonia, or tics and fidgeting  Thought Process:  logical, linear and goal oriented  Associations:  no loose associations  Thought Content:  no evidence of suicidal ideation or homicidal ideation, patient appears to be responding to internal stimuli and appears paranoid but denies the presence of auditory hallucinations.  Insight:  fair  Judgment:  poor  Oriented to:  time, person, and place  Attention Span and Concentration:  limited  Recent and Remote Memory:  limited  Language: Able  "to read and write  Fund of Knowledge: appropriate  Muscle Strength and Tone: normal  Gait and Station: Normal          Labs/Vitals:   /78   Pulse 67   Temp 97.8  F (36.6  C) (Oral)   Resp 17   Ht 1.727 m (5' 8\")   Wt 88.1 kg (194 lb 3.2 oz)   SpO2 97%   BMI 29.53 kg/m    No results found for this or any previous visit (from the past 24 hour(s)).    Impression:   Hermann Arita is a 34-year-old single father of 2 with history of schizophrenia, who is on a provisional discharge from [facility], where he was civilly committed on account of medication noncompliance and a history of schizophrenia.  He had eloped from [facility] about 2 weeks ago and has been off medications since then.  He will be restarted on prior to admission medications.     2020: Patient received IM Haldol decanoate 25 mg    DIagnoses:     1.  Schizophrenia, in acute phase.   2.  Cannabis use disorder.   3.  Gastroesophageal reflux disease.          Plan:   1. Written information given on medications. Side effects, risks, benefits reviewed.  2. Medication changes: None.  3. Legal Status: 72-hour hold  at 2020 at 1:59 PM.  Voluntary.  4. Continue hospitalization.      Attestation:  Patient has been seen and evaluated by me, Willard Hurley MD today.    PATIENT ID  Name: Hermann Arita  MRN:[id number]  YOB: 1985  "

## 2020-02-11 NOTE — PLAN OF CARE
Pt contacted [facility] to make arrangements to secure his belongings.  Pt visible on unit.  Pt interacted appropriately with peers and staff.  Pt appeared in good spirits and joked around a bit with staff and peers. Pt assisted staff in getting OT treat for group.

## 2020-02-12 PROCEDURE — 12400000 ZZH R&B MH

## 2020-02-12 PROCEDURE — 25000132 ZZH RX MED GY IP 250 OP 250 PS 637: Mod: GY | Performed by: PSYCHIATRY & NEUROLOGY

## 2020-02-12 PROCEDURE — 25000132 ZZH RX MED GY IP 250 OP 250 PS 637: Mod: GY | Performed by: NURSE PRACTITIONER

## 2020-02-12 RX ADMIN — IBUPROFEN 800 MG: 400 TABLET ORAL at 17:51

## 2020-02-12 RX ADMIN — OMEPRAZOLE 20 MG: 20 CAPSULE, DELAYED RELEASE ORAL at 08:33

## 2020-02-12 RX ADMIN — GABAPENTIN 100 MG: 100 CAPSULE ORAL at 08:33

## 2020-02-12 RX ADMIN — IBUPROFEN 800 MG: 400 TABLET ORAL at 10:49

## 2020-02-12 RX ADMIN — OLANZAPINE 20 MG: 10 TABLET, FILM COATED ORAL at 20:45

## 2020-02-12 RX ADMIN — GABAPENTIN 100 MG: 100 CAPSULE ORAL at 20:45

## 2020-02-12 RX ADMIN — RAMELTEON 8 MG: 8 TABLET, FILM COATED ORAL at 20:45

## 2020-02-12 ASSESSMENT — ACTIVITIES OF DAILY LIVING (ADL)
ORAL_HYGIENE: INDEPENDENT
HYGIENE/GROOMING: INDEPENDENT
LAUNDRY: WITH SUPERVISION
DRESS: INDEPENDENT

## 2020-02-12 NOTE — PLAN OF CARE
Problem: Psychotic Symptoms  Goal: Psychotic Symptoms  Description  Signs and symptoms of listed problems will be absent or manageable.  Anxiety,   Internal preoccupation.   2/11/2020 2212 by Jose Wylie  Outcome: Improving  Flowsheets (Taken 2/11/2020 2212)  Psychotic Symptoms Assessed: all  Psychotic Symptoms Present: insight; thought process  Note:   Pt presents with a full range affect and calm mood. Pt spent the majority of the shift pacing the halls or socializing in the lounge. Pt was pleasant and interactive with peers, some times with a playful behavior. Pt ate all of his food and was med compliant. Pt denies Si.

## 2020-02-12 NOTE — PLAN OF CARE
"  Problem: Psychotic Symptoms  Goal: Psychotic Symptoms  Description  Signs and symptoms of listed problems will be absent or manageable.  Anxiety,   Internal preoccupation.   Flowsheets (Taken 2/11/2020 2212 by Jose Wylie)  Psychotic Symptoms Assessed: all  Psychotic Symptoms Present: insight;thought process  Note:   Pt presents with a full range affect and restless mood. Pt thought process is impaired, insight is poor. Pt did not attend any unit programming but was seen pacing the halls, socializing with peers, or laying in bed. Pt displays poor boundaries with peers and staff at times. Pt states he is doing fine today, \"I just want to know when I'm going to leave\". When asked how pt is feeling about going to an IRTS facility, he states \"fine\". Pt denies Si.        "

## 2020-02-12 NOTE — PROGRESS NOTES
received a voicemail from Kandy at [facility] ([phone]) stating that they have immediate openings. They would be willing to do a phone interview with patient tomorrow.  called back this afternoon and left a message for her indicating that patient is still hospitalized and awaiting IRTS placement.  will call her in the morning to connect about doing the phone interview with patient.  met with patient to update him on the above, and he is agreeable to doing a phone interview tomorrow with [facility].      received a call this afternoon from patient's [facility] , [place] ([phone]). She stated that [facility] and was wondering what to do with them.  updated her with the above, and she stated that she will hold on to his belongings pending whether or not he gets placed in an IRTS in the next day or so. If he is placed she will bring the belongings directly to the IRTS. Otherwise she will drop them off at the hospital.

## 2020-02-12 NOTE — PROGRESS NOTES
[facility] Psychiatric Progress Note      Interval History:   Pt seen, team meeting held with , nursing staff, OT, and PA's to review diagnosis and treatment plan.  Patient reports he is doing well and had specific questions regarding his placement.  He reportedly has been interacting appropriately with staff and his peers.  He reports that the pain in his feet have significantly declined although staff report that his big toes are sensitive to firm palpation and he still reports some numbness.  He does have pulses palpable +2.  He does not endorse the presence of auditory or visual hallucinations.  He denies experiencing any self-harm thoughts plans or intent.  Unit  reports she is still waiting for word from [facility].       Review of systems:   The Review of Systems completed by Willard Hurley MD is negative other than noted in the HPI     Medications:       gabapentin  100 mg Oral BID     haloperidol decanoate  25 mg Intramuscular Q28 Days     OLANZapine  20 mg Oral At Bedtime     omeprazole  20 mg Oral QAM AC     ramelteon  8 mg Oral At Bedtime     benztropine, bisacodyl, hydrOXYzine, ibuprofen, magnesium hydroxide, OLANZapine **OR** OLANZapine, traZODone    Mental Status Examination:     Appearance:  dressed in hospital scrubs, appeared older than stated age and slightly unkempt  Attitude:  somewhat cooperative  Eye Contact:  fair  Mood:  better  Affect:  mood congruent, guarded and restricted range  Speech:  clear, coherent  Psychomotor Behavior:  no evidence of tardive dyskinesia, dystonia, or tics and fidgeting  Thought Process:  logical, linear and goal oriented  Associations:  no loose associations  Thought Content:  no evidence of suicidal ideation or homicidal ideation and no evidence of psychotic thought  Insight:  fair  Judgment:  fair  Oriented to:  time, person, and place  Attention Span and Concentration:  fair  Recent and Remote  "Memory:  fair  Language: Able to name objects, Able to repeat phrases and Able to read and write  Fund of Knowledge: appropriate  Muscle Strength and Tone: normal  Gait and Station: Normal          Labs/Vitals:   /74   Pulse 71   Temp 97.6  F (36.4  C) (Oral)   Resp 16   Ht 1.727 m (5' 8\")   Wt 88.1 kg (194 lb 3.2 oz)   SpO2 97%   BMI 29.53 kg/m    No results found for this or any previous visit (from the past 24 hour(s)).    Impression:   Hermann Arita is a 34-year-old single father of 2 with history of schizophrenia, who is on a provisional discharge from [facility], where he was civilly committed on account of medication noncompliance and a history of schizophrenia.  He had eloped from [facility] about 2 weeks ago and has been off medications since then.  He will be restarted on prior to admission medications.     2020: Patient received IM Haldol decanoate 25 mg    DIagnoses:     1.  Schizophrenia, in acute phase.   2.  Cannabis use disorder.   3.  Gastroesophageal reflux disease.          Plan:   1. Written information given on medications. Side effects, risks, benefits reviewed.  2. Medication changes: None.  3. Legal Status: 72-hour hold  at 2020 at 1:59 PM.  Voluntary.  4. Continue hospitalization.      Attestation:  Patient has been seen and evaluated by me, Willard Hurley MD today.    PATIENT ID  Name: Hermann Arita  MRN:[id number]  YOB: 1985  "

## 2020-02-13 PROCEDURE — 12400000 ZZH R&B MH

## 2020-02-13 PROCEDURE — 25000132 ZZH RX MED GY IP 250 OP 250 PS 637: Mod: GY | Performed by: PSYCHIATRY & NEUROLOGY

## 2020-02-13 PROCEDURE — 25000132 ZZH RX MED GY IP 250 OP 250 PS 637: Mod: GY | Performed by: NURSE PRACTITIONER

## 2020-02-13 RX ORDER — HALOPERIDOL DECANOATE 100 MG/ML
25 INJECTION INTRAMUSCULAR
Qty: 0.25 ML | Refills: 0 | Status: SHIPPED | OUTPATIENT
Start: 2020-02-13 | End: 2020-02-18

## 2020-02-13 RX ORDER — TRAZODONE HYDROCHLORIDE 50 MG/1
50 TABLET, FILM COATED ORAL
Qty: 30 TABLET | Refills: 0 | Status: ON HOLD | OUTPATIENT
Start: 2020-02-13 | End: 2020-03-13

## 2020-02-13 RX ORDER — RAMELTEON 8 MG/1
8 TABLET ORAL AT BEDTIME
Qty: 30 TABLET | Refills: 0 | Status: ON HOLD | OUTPATIENT
Start: 2020-02-13 | End: 2020-03-12

## 2020-02-13 RX ORDER — BENZTROPINE MESYLATE 0.5 MG/1
0.5 TABLET ORAL 2 TIMES DAILY PRN
Qty: 60 TABLET | Refills: 0 | Status: ON HOLD | OUTPATIENT
Start: 2020-02-13 | End: 2020-03-12

## 2020-02-13 RX ORDER — GABAPENTIN 100 MG/1
100 CAPSULE ORAL 2 TIMES DAILY
Qty: 60 CAPSULE | Refills: 0 | Status: ON HOLD | OUTPATIENT
Start: 2020-02-13 | End: 2020-03-12

## 2020-02-13 RX ORDER — IBUPROFEN 400 MG/1
400-800 TABLET, FILM COATED ORAL EVERY 6 HOURS PRN
Qty: 120 TABLET | Refills: 0 | Status: SHIPPED | OUTPATIENT
Start: 2020-02-13 | End: 2020-07-22

## 2020-02-13 RX ORDER — OLANZAPINE 20 MG/1
20 TABLET ORAL AT BEDTIME
Qty: 30 TABLET | Refills: 0 | Status: ON HOLD | OUTPATIENT
Start: 2020-02-13 | End: 2020-03-12

## 2020-02-13 RX ADMIN — OMEPRAZOLE 20 MG: 20 CAPSULE, DELAYED RELEASE ORAL at 07:47

## 2020-02-13 RX ADMIN — BENZTROPINE MESYLATE 0.5 MG: 0.5 TABLET ORAL at 21:01

## 2020-02-13 RX ADMIN — IBUPROFEN 800 MG: 400 TABLET ORAL at 12:25

## 2020-02-13 RX ADMIN — RAMELTEON 8 MG: 8 TABLET, FILM COATED ORAL at 21:01

## 2020-02-13 RX ADMIN — GABAPENTIN 100 MG: 100 CAPSULE ORAL at 07:47

## 2020-02-13 RX ADMIN — GABAPENTIN 100 MG: 100 CAPSULE ORAL at 21:01

## 2020-02-13 RX ADMIN — IBUPROFEN 800 MG: 400 TABLET ORAL at 19:24

## 2020-02-13 RX ADMIN — OLANZAPINE 20 MG: 10 TABLET, FILM COATED ORAL at 21:01

## 2020-02-13 NOTE — PROGRESS NOTES
[facility] Psychiatric Progress Note      Interval History:   Pt seen, team meeting held with , nursing staff, OT, and PA's to review diagnosis and treatment plan. Records suggest that the unit  received a voicemail from Kandy at [facility] ([phone]) stating that they have immediate openings. They would be willing to do a phone interview with patient tomorrow.  met with patient to update him on the above, and he is agreeable to doing a phone interview tomorrow with [facility]. Patient is willing to go to any facility that is willing to take him as long as he is able to go out. Tolerating medications well without significant side effects. Denies suicidal or homicidal ideation.     Review of systems:   The Review of Systems completed by Willard Hurley MD is negative other than noted in the HPI     Medications:       gabapentin  100 mg Oral BID     haloperidol decanoate  25 mg Intramuscular Q28 Days     OLANZapine  20 mg Oral At Bedtime     omeprazole  20 mg Oral QAM AC     ramelteon  8 mg Oral At Bedtime     benztropine, bisacodyl, hydrOXYzine, ibuprofen, magnesium hydroxide, OLANZapine **OR** OLANZapine, traZODone    Mental Status Examination:     Appearance:  dressed in hospital scrubs, appeared older than stated age and slightly unkempt  Attitude:  somewhat cooperative  Eye Contact:  fair  Mood:  better  Affect:  mood congruent, guarded and restricted range  Speech:  clear, coherent  Psychomotor Behavior:  no evidence of tardive dyskinesia, dystonia, or tics and fidgeting  Thought Process:  logical, linear and goal oriented  Associations:  no loose associations  Thought Content:  no evidence of suicidal ideation or homicidal ideation and no evidence of psychotic thought  Insight:  fair  Judgment:  fair  Oriented to:  time, person, and place  Attention Span and Concentration:  fair  Recent and Remote Memory:  fair  Language: Able to name  "objects, Able to repeat phrases and Able to read and write  Fund of Knowledge: appropriate  Muscle Strength and Tone: normal  Gait and Station: Normal          Labs/Vitals:   /76   Pulse 70   Temp 97.7  F (36.5  C) (Oral)   Resp 16   Ht 1.727 m (5' 8\")   Wt 88.1 kg (194 lb 3.2 oz)   SpO2 98%   BMI 29.53 kg/m    No results found for this or any previous visit (from the past 24 hour(s)).    Impression:   Hermann Arita is a 34-year-old single father of 2 with history of schizophrenia, who is on a provisional discharge from [facility], where he was civilly committed on account of medication noncompliance and a history of schizophrenia.  He had eloped from [facility] about 2 weeks ago and has been off medications since then.  He will be restarted on prior to admission medications.     2020: Patient received IM Haldol decanoate 25 mg    DIagnoses:     1.  Schizophrenia, in acute phase.   2.  Cannabis use disorder.   3.  Gastroesophageal reflux disease.          Plan:   1. Written information given on medications. Side effects, risks, benefits reviewed.  2. Medication changes: None.  3. Legal Status: 72-hour hold  at 2020 at 1:59 PM.  Voluntary.  4. Continue hospitalization.      Attestation:  Patient has been seen and evaluated by me, Willard Hurley MD today.    PATIENT ID  Name: Hermann Arita  MRN:[id number]  YOB: 1985  "

## 2020-02-13 NOTE — PLAN OF CARE
Problem: Psychotic Symptoms  Goal: Psychotic Symptoms  Description  Signs and symptoms of listed problems will be absent or manageable.  Anxiety,   Internal preoccupation.   Flowsheets (Taken 2/13/2020 6863)  Psychotic Symptoms Assessed: all  Psychotic Symptoms Present: affect; mood; insight  Note:   Patient's vitals were WNL. Patient had no medication changes and took no psychiatric PRNs. Patient was medication compliant. Patient had a phone interview with an IRTs facility and stated that it went well. Patient presents as anxious and exhibits general restlessness. Patient was present on the unit and social with peers and staff. Patient however presents with a flat affect but brightens upon approach. Patient attended no groups.

## 2020-02-13 NOTE — PROGRESS NOTES
called and left a message for Kandy at [facility] ([phone]) to find out what time she would like to do the phone interview with patient.  awaiting call back.      received a call from Mercedes at [facility] ([phone]) today. She stated that patient is more than [facility] up the wait list. They anticipate a wait of approximately two weeks, and will continue to call every few days with an update.  will keep patient on the list at [facility] in case [facility] does not accept him for admission.      received a call back from Kandy at [facility]. She will plan to call the station sometime this afternoon to do the phone interview with patient. If they do accept patient for admission, she will fax over paperwork which the psychiatrist will need to complete prior to admission. She will call  after she has completed the phone interview.  met with patient to update him and he is agreeable with this plan.      received a voicemail from Kandy at [facility] this afternoon. She did the phone interview with patient this afternoon, and she stated that it went well and that patient should be a good fit for their facility. She faxed over paperwork for psychiatrist to complete for admission. She stated that they will have an opening on Tuesday 02/18/2020, so would like to accept patient for admission then.  discussed the above with patient and he is in agreement with the plan for him to admit to [facility] on Tuesday.     Psychiatrist completed admission paperwork for [facility] this afternoon and ordered medications from [facility] in [place].  faxed paperwork to [facility] this afternoon as well.  then called patient's county , [place] ([phone]), to notify her of the above. She plans to come to the hospital tomorrow to meet with patient.

## 2020-02-13 NOTE — PLAN OF CARE
Pt was present on the unit socializing with peers, initiating conversations and playing board games in the lounge. He attended Wrap Up Group with proper participation. Pt displays inappropriate boudaries with peers and needs redirection at times. He showered this evening and engaged in pacing the hallway.

## 2020-02-14 PROCEDURE — 12400000 ZZH R&B MH

## 2020-02-14 PROCEDURE — 25000132 ZZH RX MED GY IP 250 OP 250 PS 637: Mod: GY | Performed by: PSYCHIATRY & NEUROLOGY

## 2020-02-14 PROCEDURE — 25000132 ZZH RX MED GY IP 250 OP 250 PS 637: Mod: GY | Performed by: NURSE PRACTITIONER

## 2020-02-14 RX ADMIN — TRAZODONE HYDROCHLORIDE 50 MG: 50 TABLET ORAL at 20:46

## 2020-02-14 RX ADMIN — IBUPROFEN 800 MG: 400 TABLET ORAL at 20:47

## 2020-02-14 RX ADMIN — GABAPENTIN 100 MG: 100 CAPSULE ORAL at 20:47

## 2020-02-14 RX ADMIN — IBUPROFEN 800 MG: 400 TABLET ORAL at 14:45

## 2020-02-14 RX ADMIN — GABAPENTIN 100 MG: 100 CAPSULE ORAL at 08:29

## 2020-02-14 RX ADMIN — OLANZAPINE 20 MG: 10 TABLET, FILM COATED ORAL at 20:47

## 2020-02-14 RX ADMIN — BENZTROPINE MESYLATE 0.5 MG: 0.5 TABLET ORAL at 20:51

## 2020-02-14 RX ADMIN — RAMELTEON 8 MG: 8 TABLET, FILM COATED ORAL at 20:47

## 2020-02-14 RX ADMIN — OMEPRAZOLE 20 MG: 20 CAPSULE, DELAYED RELEASE ORAL at 08:29

## 2020-02-14 ASSESSMENT — ACTIVITIES OF DAILY LIVING (ADL)
DRESS: INDEPENDENT
ORAL_HYGIENE: INDEPENDENT
HYGIENE/GROOMING: INDEPENDENT
LAUNDRY: WITH SUPERVISION

## 2020-02-14 NOTE — PLAN OF CARE
Problem: Adult Behavioral Health Plan of Care  Goal: Patient-Specific Goal (Individualization)  Outcome: No Change  Note:   Pt has been spending time out in the lounge and pacing in the halls. Pt hs some bizarre behaviors and having some [employer]ism preoccupation. Pt is anxious about discharging on Tuesday but feels ready.pt still has some pain in his feet using ibuprofen for pain relief. Pt states the pain has gotten better. Pt has been attending groups and participating well. Pt is pleasant and cooperative. Nursing to continue to monitor.

## 2020-02-14 NOTE — PROGRESS NOTES
"[facility] Psychiatric Progress Note      Interval History:   Pt seen, team meeting held with , nursing staff, OT, and PA's to review diagnosis and treatment plan. Patient reports that he is doing okay. He says the reason he is here is because in 2012, he heard the voice of God telling him to \"Stand tall for creation, you know like Juan did but then I had a dream about SpongeBob eating up something that came back to life and then I thought I ate my dog but he did not come back to life. Since then this has been messing with me.  This is why I have schizophrenia.  I left [facility] because I thought everything then was going to die.  I also thought it was getting stronger like a God.\"  Patient claims that in the past he has been experiencing intrusive thoughts since his admission his mind has quiet him down and he is able to think.  He is also noted to be somewhat impulsive but he insists that he is not experiencing any auditory hallucinations.  He denies any self-harm thoughts plans or intent.  He is tolerating medications without any untoward effects.  He is scheduled for discharge for Tuesday 2/18/2022 [facility].     Review of systems:   The Review of Systems completed by Willard Hurley MD is negative other than noted in the HPI     Medications:       gabapentin  100 mg Oral BID     haloperidol decanoate  25 mg Intramuscular Q28 Days     OLANZapine  20 mg Oral At Bedtime     omeprazole  20 mg Oral QAM AC     ramelteon  8 mg Oral At Bedtime     benztropine, bisacodyl, hydrOXYzine, ibuprofen, magnesium hydroxide, OLANZapine **OR** OLANZapine, traZODone    Mental Status Examination:     Appearance:  awake, alert, appeared older than stated age and slightly unkempt  Attitude:  somewhat cooperative  Eye Contact:  fair  Mood:  better  Affect:  mood congruent, guarded and restricted range  Speech:  clear, coherent  Psychomotor Behavior:  no evidence of tardive " "dyskinesia, dystonia, or tics and fidgeting  Thought Process:  logical, linear and goal oriented  Associations:  no loose associations  Thought Content:  no evidence of suicidal ideation or homicidal ideation and no evidence of psychotic thought  Insight:  fair  Judgment:  fair  Oriented to:  time, person, and place  Attention Span and Concentration:  fair  Recent and Remote Memory:  fair  Language: Able to name objects, Able to repeat phrases and Able to read and write  Fund of Knowledge: appropriate  Muscle Strength and Tone: normal  Gait and Station: Normal          Labs/Vitals:   /80   Pulse 62   Temp 97.4  F (36.3  C) (Oral)   Resp 17   Ht 1.727 m (5' 8\")   Wt 88.1 kg (194 lb 3.2 oz)   SpO2 99%   BMI 29.53 kg/m    No results found for this or any previous visit (from the past 24 hour(s)).    Impression:   Hermann Arita is a 34-year-old single father of 2 with history of schizophrenia, who is on a provisional discharge from [facility], where he was civilly committed on account of medication noncompliance and a history of schizophrenia.  He had eloped from [facility] about 2 weeks ago and has been off medications since then.  He will be restarted on prior to admission medications.     2020: Patient received IM Haldol decanoate 25 mg    DIagnoses:     1.  Schizophrenia, in acute phase.   2.  Cannabis use disorder.   3.  Gastroesophageal reflux disease.          Plan:   1. Written information given on medications. Side effects, risks, benefits reviewed.  2. Medication changes: None.  3. Legal Status: 72-hour hold  at 2020 at 1:59 PM.  Voluntary.  4. Continue hospitalization.      Attestation:  Patient has been seen and evaluated by me, Willard Hurley MD today.    PATIENT ID  Name: Hermann Arita  MRN:[id number]  YOB: 1985  "

## 2020-02-14 NOTE — PROGRESS NOTES
" met with patient this afternoon and had him sign a release of information form for [facility] ([facility]) because that is where he had been referred following his last hospitalization at [facility].  called [facility] ([phone] / [phone] fax) this afternoon and spoke with one of the schedulers. She stated that patient had been scheduled for an appointment, but had been a \"no show.\"  provided her with a little bit of background information on what had happened between patient's last discharge and this admission.  also reviewed that patient remains under civil commitment in [facility] and has been prescribed a long-acting injectable medication which he will need on 03/04/2020. She reviewed this information with her supervisor, Saundra, and she stated that her supervisor will try to get patient in following discharge from the hospital to establish care, so that he get his injection on the date that it is due. [facility] is closed on Monday due to President's Day so the supervisor will call  early on Tuesday with an appointment date.  awaiting call back on Tuesday 02/18/2020.   "

## 2020-02-14 NOTE — PROGRESS NOTES
"Patient spoke about his Schizophrenia being a shape shifter. He also mentioned being a sacrifice or \"chosen\" to live like Juan. He talked about biblical verses and numerology. Reported dealing with this for the last 12 years. Patient also told staff member that he went to intermediate and his body was picked apart and studied. He said, \"I put my hand in my butt, I know, it's gross, but they kept probing down there.\" He also mentioned that they cut his chest open and tried to \"even out\" his emotions. He continued to talk about Juan, the bible, and being chosen for about 20 minutes.   "

## 2020-02-14 NOTE — PLAN OF CARE
Pt mood is elated, affect is full range. Thought process is impaired. Insight is poor. Pt denies suicidal ideation, was med and food compliant, attended the wrap up group. Pt spent time socializing with peers. Pt was observed making loud noises in the hallway (barking, chirping etc.), to which staff redirected.

## 2020-02-15 PROCEDURE — 25000132 ZZH RX MED GY IP 250 OP 250 PS 637: Mod: GY | Performed by: PSYCHIATRY & NEUROLOGY

## 2020-02-15 PROCEDURE — 25000132 ZZH RX MED GY IP 250 OP 250 PS 637: Mod: GY | Performed by: NURSE PRACTITIONER

## 2020-02-15 PROCEDURE — 12400000 ZZH R&B MH

## 2020-02-15 RX ADMIN — RAMELTEON 8 MG: 8 TABLET, FILM COATED ORAL at 21:12

## 2020-02-15 RX ADMIN — IBUPROFEN 800 MG: 400 TABLET ORAL at 21:12

## 2020-02-15 RX ADMIN — OLANZAPINE 20 MG: 10 TABLET, FILM COATED ORAL at 21:12

## 2020-02-15 RX ADMIN — OMEPRAZOLE 20 MG: 20 CAPSULE, DELAYED RELEASE ORAL at 07:13

## 2020-02-15 RX ADMIN — GABAPENTIN 100 MG: 100 CAPSULE ORAL at 08:57

## 2020-02-15 RX ADMIN — TRAZODONE HYDROCHLORIDE 50 MG: 50 TABLET ORAL at 21:12

## 2020-02-15 RX ADMIN — GABAPENTIN 100 MG: 100 CAPSULE ORAL at 21:12

## 2020-02-15 NOTE — PLAN OF CARE
Shift Update: Pt mood is calm, affect is full range. Thought process is impaired. Insight is poor. Pt denies suicidal ideation, was med and food compliant, attended groups. Pt spent time socializing with peers and staffs. Pleasant and cooperative. Pt looks forward to his discharge on Tuesday despite feeling a little anxious. Continues to soak his feet to sooth the ache.

## 2020-02-15 NOTE — PLAN OF CARE
Pt pleasant and cooperative.  Pt contacted  about belongings.  Pt played games with peers in lounge most of shift.  Pt seems prepared/ready next step.

## 2020-02-16 PROCEDURE — 25000132 ZZH RX MED GY IP 250 OP 250 PS 637: Mod: GY | Performed by: PSYCHIATRY & NEUROLOGY

## 2020-02-16 PROCEDURE — 25000132 ZZH RX MED GY IP 250 OP 250 PS 637: Mod: GY | Performed by: NURSE PRACTITIONER

## 2020-02-16 PROCEDURE — 12400000 ZZH R&B MH

## 2020-02-16 RX ADMIN — IBUPROFEN 800 MG: 400 TABLET ORAL at 08:05

## 2020-02-16 RX ADMIN — IBUPROFEN 800 MG: 400 TABLET ORAL at 19:29

## 2020-02-16 RX ADMIN — BENZTROPINE MESYLATE 0.5 MG: 0.5 TABLET ORAL at 21:13

## 2020-02-16 RX ADMIN — TRAZODONE HYDROCHLORIDE 50 MG: 50 TABLET ORAL at 23:04

## 2020-02-16 RX ADMIN — OLANZAPINE 20 MG: 10 TABLET, FILM COATED ORAL at 21:13

## 2020-02-16 RX ADMIN — OMEPRAZOLE 20 MG: 20 CAPSULE, DELAYED RELEASE ORAL at 06:56

## 2020-02-16 RX ADMIN — GABAPENTIN 100 MG: 100 CAPSULE ORAL at 08:05

## 2020-02-16 RX ADMIN — GABAPENTIN 100 MG: 100 CAPSULE ORAL at 21:13

## 2020-02-16 RX ADMIN — TRAZODONE HYDROCHLORIDE 50 MG: 50 TABLET ORAL at 21:13

## 2020-02-16 RX ADMIN — RAMELTEON 8 MG: 8 TABLET, FILM COATED ORAL at 21:13

## 2020-02-16 NOTE — PLAN OF CARE
Shift Update: Pt mood is calm, affect is full range. Thought process is impaired. Insight is poor. Pt denies suicidal ideation, was med and food compliant, attended groups. Pt is respectful to staffs but exhibits poor boundaries. Pt stated during a group that he believes it is his job to start the Second Coming.

## 2020-02-16 NOTE — PLAN OF CARE
Pt mood is calm, affect is full range. Pt joking around in a pleasant and moderate manner. Pt spent time socializing with peers and staff. Pt played games and attended wrap up group. Pleasant and cooperative. Pt looks forward to his discharge on Tuesday despite feeling a little anxious.

## 2020-02-17 PROCEDURE — 25000132 ZZH RX MED GY IP 250 OP 250 PS 637: Mod: GY | Performed by: PSYCHIATRY & NEUROLOGY

## 2020-02-17 PROCEDURE — 25000132 ZZH RX MED GY IP 250 OP 250 PS 637: Mod: GY | Performed by: NURSE PRACTITIONER

## 2020-02-17 PROCEDURE — 12400000 ZZH R&B MH

## 2020-02-17 RX ADMIN — IBUPROFEN 800 MG: 400 TABLET ORAL at 18:09

## 2020-02-17 RX ADMIN — GABAPENTIN 100 MG: 100 CAPSULE ORAL at 09:09

## 2020-02-17 RX ADMIN — OLANZAPINE 20 MG: 10 TABLET, FILM COATED ORAL at 21:27

## 2020-02-17 RX ADMIN — RAMELTEON 8 MG: 8 TABLET, FILM COATED ORAL at 21:27

## 2020-02-17 RX ADMIN — TRAZODONE HYDROCHLORIDE 50 MG: 50 TABLET ORAL at 22:03

## 2020-02-17 RX ADMIN — OMEPRAZOLE 20 MG: 20 CAPSULE, DELAYED RELEASE ORAL at 06:22

## 2020-02-17 RX ADMIN — IBUPROFEN 400 MG: 400 TABLET ORAL at 09:09

## 2020-02-17 RX ADMIN — GABAPENTIN 100 MG: 100 CAPSULE ORAL at 21:27

## 2020-02-17 RX ADMIN — TRAZODONE HYDROCHLORIDE 50 MG: 50 TABLET ORAL at 21:27

## 2020-02-17 RX ADMIN — BENZTROPINE MESYLATE 0.5 MG: 0.5 TABLET ORAL at 21:27

## 2020-02-17 ASSESSMENT — ACTIVITIES OF DAILY LIVING (ADL)
HYGIENE/GROOMING: INDEPENDENT
DRESS: INDEPENDENT
LAUNDRY: WITH SUPERVISION
DRESS: INDEPENDENT
HYGIENE/GROOMING: INDEPENDENT
ORAL_HYGIENE: INDEPENDENT
ORAL_HYGIENE: INDEPENDENT

## 2020-02-17 NOTE — PLAN OF CARE
BEHAVIORAL TEAM DISCUSSION    Participants: MD, RN, CM, PA, OT   Progress: Improving  Anticipated length of stay: Planned discharge tomorrow.   Continued Stay Criteria/Rationale: N/A  Medical/Physical: Per hospitalist consult  Precautions:   Behavioral Orders   Procedures    Code 1 - Restrict to Unit    Routine Programming     As clinically indicated    Status 15     Every 15 minutes.     Plan: Discharge on Tuesday 02/18/20 to [facility] for continued care.     Rationale for change in precautions or plan: Improved and ready to discharge.

## 2020-02-17 NOTE — PLAN OF CARE
Problem: Psychotic Symptoms  Goal: Psychotic Symptoms  Description  Signs and symptoms of listed problems will be absent or manageable.  Anxiety,   Internal preoccupation.   Outcome: Improving  Flowsheets (Taken 2/17/2020 1423)  Psychotic Symptoms Assessed: all  Psychotic Symptoms Present: mood; anxiety; insight  Note:   Patient's vitals were WNL. Patient took no psychiatric PRNs. Patient was medication compliant. Patient had no medication changes. Patient was visible on the unit and social with peers. Patient at times can cross boundaries but is redirectable. Patient was mildly elated at times. Patient appears restless and was often pacing the halls. Patient stated that his mood was normal and reported no anxiety. Patient attended groups and participated appropriately. Patient was present with a full range affect.

## 2020-02-17 NOTE — PLAN OF CARE
Shift Update: Pt mood is calm, affect is full range. Thought process is impaired. Insight is poor. Pt denies suicidal ideation, was med and food compliant, attended groups. Pt has been better at respecting peers boundaries this evening. Spent time playing board games and cards with peers. Looking forward to his discharge this Tuesday.

## 2020-02-17 NOTE — PROGRESS NOTES
received a voicemail from Kandy at [facility] ([phone]) confirming patient for admission on Tuesday 02/18/2020 in the morning after 1000.  called her back and left a message acknowledging the above message.  stated in the voicemail that patient would be sent between 1030 and 1100 by cab to [facility], in order to have enough time in the morning to confirm patient's follow up psychiatry appointment at [facility]. The scheduling supervisor at [facility] is supposed to call tomorrow morning with an appointment time and date as they are trying to add patient into their schedule as they had no immediate openings.

## 2020-02-17 NOTE — PROGRESS NOTES
[facility] Psychiatric Progress Note      Interval History:   Pt seen, team meeting held with , nursing staff, OT, and PA's to review diagnosis and treatment plan.  Staff report that the patient did better over the weekend and was very respectful of boundaries.  He spent time playing board games and cards with his peers.  He did not endorse or describe any delusional themes.  He is looking forward to discharging from this unit tomorrow.  He denies experiencing any self-harm thoughts plans or intent.     Review of systems:   The Review of Systems completed by Willard Hurley MD is negative other than noted in the HPI     Medications:       gabapentin  100 mg Oral BID     haloperidol decanoate  25 mg Intramuscular Q28 Days     OLANZapine  20 mg Oral At Bedtime     omeprazole  20 mg Oral QAM AC     ramelteon  8 mg Oral At Bedtime     benztropine, bisacodyl, hydrOXYzine, ibuprofen, magnesium hydroxide, OLANZapine **OR** OLANZapine, traZODone    Mental Status Examination:     Appearance:  awake, alert, appeared older than stated age and slightly unkempt  Attitude:  cooperative  Eye Contact:  fair  Mood:  better  Affect:  mood congruent, guarded and restricted range  Speech:  clear, coherent  Psychomotor Behavior:  no evidence of tardive dyskinesia, dystonia, or tics and fidgeting  Thought Process:  logical, linear and goal oriented  Associations:  no loose associations  Thought Content:  no evidence of suicidal ideation or homicidal ideation and no evidence of psychotic thought  Insight:  fair  Judgment:  fair  Oriented to:  time, person, and place  Attention Span and Concentration:  fair  Recent and Remote Memory:  fair  Language: Able to name objects, Able to repeat phrases and Able to read and write  Fund of Knowledge: appropriate  Muscle Strength and Tone: normal  Gait and Station: Normal          Labs/Vitals:   /63   Pulse 57   Temp 97.5  F (36.4  C) (Oral)   Resp 16  "  Ht 1.727 m (5' 8\")   Wt 88.1 kg (194 lb 3.2 oz)   SpO2 96%   BMI 29.53 kg/m    No results found for this or any previous visit (from the past 24 hour(s)).    Impression:   Hermann Arita is a 34-year-old single father of 2 with history of schizophrenia, who is on a provisional discharge from [facility], where he was civilly committed on account of medication noncompliance and a history of schizophrenia.  He had eloped from [facility] about 2 weeks ago and has been off medications since then.  He will be restarted on prior to admission medications.     2020: Patient received IM Haldol decanoate 25 mg    DIagnoses:     1.  Schizophrenia, in acute phase.   2.  Cannabis use disorder.   3.  Gastroesophageal reflux disease.          Plan:   1. Written information given on medications. Side effects, risks, benefits reviewed.  2. Medication changes: None.  3. Legal Status: 72-hour hold  at 2020 at 1:59 PM.  Voluntary.  4. Continue hospitalization.  For discharge tomorrow.      Attestation:  Patient has been seen and evaluated by me, Willard Hurley MD today.    PATIENT ID  Name: Hermann Arita  MRN:[id number]  YOB: 1985  "

## 2020-02-18 VITALS
HEIGHT: 68 IN | HEART RATE: 60 BPM | OXYGEN SATURATION: 96 % | DIASTOLIC BLOOD PRESSURE: 77 MMHG | RESPIRATION RATE: 16 BRPM | TEMPERATURE: 98.5 F | WEIGHT: 201.8 LBS | SYSTOLIC BLOOD PRESSURE: 116 MMHG | BODY MASS INDEX: 30.58 KG/M2

## 2020-02-18 PROCEDURE — 25000132 ZZH RX MED GY IP 250 OP 250 PS 637: Mod: GY | Performed by: PSYCHIATRY & NEUROLOGY

## 2020-02-18 PROCEDURE — 25000132 ZZH RX MED GY IP 250 OP 250 PS 637: Mod: GY | Performed by: NURSE PRACTITIONER

## 2020-02-18 RX ORDER — HALOPERIDOL DECANOATE 100 MG/ML
25 INJECTION INTRAMUSCULAR
Qty: 0.25 ML | Refills: 1 | Status: ON HOLD | OUTPATIENT
Start: 2020-02-18 | End: 2020-03-12

## 2020-02-18 RX ADMIN — OMEPRAZOLE 20 MG: 20 CAPSULE, DELAYED RELEASE ORAL at 07:37

## 2020-02-18 RX ADMIN — IBUPROFEN 800 MG: 400 TABLET ORAL at 05:02

## 2020-02-18 RX ADMIN — GABAPENTIN 100 MG: 100 CAPSULE ORAL at 07:37

## 2020-02-18 RX ADMIN — IBUPROFEN 800 MG: 400 TABLET ORAL at 10:28

## 2020-02-18 ASSESSMENT — ACTIVITIES OF DAILY LIVING (ADL)
LAUNDRY: WITH SUPERVISION
DRESS: INDEPENDENT
ORAL_HYGIENE: INDEPENDENT
HYGIENE/GROOMING: INDEPENDENT

## 2020-02-18 ASSESSMENT — MIFFLIN-ST. JEOR: SCORE: 1824.86

## 2020-02-18 NOTE — PROGRESS NOTES
Patient denies suicidal ideation. Reviewed discharge instructions with the patient. Patient has a copy of discharge instructions and verbalizes understanding of them. Discharged via cab to [facility] at 1100. Sent with 30 day supply of meds.

## 2020-02-18 NOTE — DISCHARGE INSTRUCTIONS
Behavioral Discharge Planning and Instructions    Summary: Admitted for decompensation in the context of going off psychiatric medications.     Main Diagnosis:    1.  Schizophrenia, in acute phase.   2.  Cannabis Use Disorder.   3.  Gastroesophageal Reflux Disease.     Major Treatments, Procedures and Findings: Psychiatric assessment. Medication adjustment.     Symptoms to Report: Feeling more agitated, Increased confusion or psychosis, Losing more sleep, Mood getting worse or Thoughts of suicide    Lifestyle Adjustment:  Follow all treatment recommendations. Develop and follow safety plan. Your safety plan is your contract with yourself to keep you safe in a crisis. Be sure to use the resources and crisis numbers listed on your safety plan.  Abstain from the use of all mood-altering substances, including alcohol, as usage may increase symptoms of psychosis as well as interfere with the effectiveness of any prescribed medications. Maintain sobriety by attending daily AA or NA meetings and obtaining a sponsor.     Psychiatry Follow-up:     You are discharging to [facility]. It is recommended that you complete the program and work with your [facility]  on more permanent community housing.     [facility]  [address] [place] [id number] [phone] / Fax: [phone]    You have a follow up appointment for medication management at [facility] on Thursday, March 5, 2020 at 11:00 am. Please arrive at 10:30 am for check in.  You will be getting your Haldol Decanoate at this appointment as it is due on this day. It is important that you keep this appointment so that you can get your injection.  The medication order for the Haldol Decanoate has been sent to [facility] in [place] per the request of [facility].     [facility] ([facility])  [facility]  [address], [address] and [place]rd [place] [address] [phone] / [phone]  fax    You are currently under a provisional discharge of your civil commitment in [facility]. This commitment remains in effect through May 15, 2020 unless it is continued by the court. Under the terms of this commitment, you will need to take all medications as prescribed by the psychiatrist, attend all scheduled appointments with your provider, meet regularly with your [facility]  and abstain from the use of alcohol, marijuana and all other mood altering substances.     Your [facility]  is Mildred Green, and she can be reached at [phone].     Your [facility]  is Whit Alycia, and she can be reached at [phone].     Resources:   Crisis Intervention: 380.497.2957 or 787-435-8274 (TTY: 267.427.5864).  Call anytime for help.  National [place] on Mental Illness (www.mn.denis.org): 479.302.2092 or 946-775-4844.  Alcoholics Anonymous (www.alcoholics-anonymous.org): Check your phone book for your local chapter.  National Suicide Prevention Line (www.mentalhealthmn.org): 864-096-RECS (5546)  COPE (Crisis Services for Adults) in [facility]: 324.135.6079 (Available 24/7)    General Medication Instructions:   See your medication sheet(s) for instructions.   Take all medicines as directed.  Make no changes unless your doctor suggests them.   Go to all your doctor visits.  Be sure to have all your required lab tests. This way, your medicines can be refilled on time.  Do not use any drugs not prescribed by your doctor.  Avoid alcohol.

## 2020-02-18 NOTE — PLAN OF CARE
Problem: Psychotic Symptoms  Goal: Psychotic Symptoms  Description  Signs and symptoms of listed problems will be absent or manageable.  Anxiety,   Internal preoccupation.   2/17/2020 2241 by Elizabeth Mar RN  Outcome: Improving  Flowsheets (Taken 2/17/2020 2230)  Psychotic Symptoms Assessed: all  Psychotic Symptoms Present: none  Note:   Patient visible all shift, pleasant and cooperative. Participated in wrap-up group and participated appropriately. During staff check in patient said he would like to ask MD to order PRN medications upon discharge tomorrow to [facility]. In particular, patient would like PRN Cogentin & PRN Trazodone. Patient said he would like to increase his dose of Trazodone increased from 50mg to 100mg since he usually requests the second available dose at bedtime. Denies suicidal ideation, contracts for safety.

## 2020-02-18 NOTE — PROGRESS NOTES
called [facility] ([phone] / [phone] fax / [phone] fax) to confirm a follow up medication management appointment for patient before he is discharged. Per the scheduling supervisor, they would like the medication order for the Haldol Decanoate injection to be sent to [facility] in [place] to be filled as they work with this particular pharmacy. They then scheduled patient for his next injection on the day that it is due, which is Thursday, 03/05/2020 at 1100, with 1030 check in. They have requested that patient's records, including the MAR, be faxed to [facility] in [facility] scheduling at [phone].  informed psychiatrist of the medication request above, and he stated that he would have the Haldol Decanoate ordered at [facility] in [place].      met with patient to confirm with him that he has a follow up appointment scheduled with [facility].  emphasized with patient that it is important that he keeps all of his appointments and that he stays on his medications. Patient acknowledged understanding of the above.  gave patient the address to [facility] along with a couple of bus tokens, in case he were to wander away again and get lost. Patient stated that he will keep the address and bus tokens in his pocket. Patient will be sent by cab from the hospital to [facility] as soon as his discharge teaching is done by the nurse.

## 2020-02-26 NOTE — DISCHARGE SUMMARY
[facility]    Discharge Summary  Adult Psychiatry    Date of Admission:  1/28/2020  Date of Discharge:  2/18/2020 11:00 AM  Discharging Provider: Willard Hurley MD      Discharge Diagnoses   1.  Schizophrenia, in acute phase.   2.  Cannabis use disorder.   3.  Gastroesophageal reflux disease without esophagitis.   4.  Mild intermittent asthma.      History of Present Illness   Hermann Arita is a 34-year-old man who reportedly is undomiciled having walked away from [facility] about 2 weeks ago and discontinued medication management.  He is on a stay of commitment from [facility] following a recent discharge from [facility], from where he was discharged on 12/30.  He was admitted on a 72-hour hold that expires on 01/31/2020 at 1:59 p.m. For more details, I refer the reader to the initial psychiatric assessment documented on 01/29/2020 by Willard Hurley MD in addition to the history and physical examination documented on 1/29/2020 by KARINE Salomon CNP.    Hospital Course   Hermann Arita was admitted on 1/28/2020.  Following admission to the mental health unit, the patient was initially quite withdrawn, internally preoccupied and almost catatonic.  He refused to participate in the initial assessment claiming he was too tired but he did come out for meals. The patient was most recently on admission at [facility], where a petition for his civil commitment was initiated and a [facility] order obtained, but he was discharged on a provisional discharge from that facility to [facility].  His [facility] order includes Zyprexa, Risperdal, clozapine and haloperidol.  Prior to that he had had other hospitalizations at [facility].  He agreed to be started back on Zyprexa Zydis at 20 mg at bedtime while utilizing Rozerem at 8 mg daily at bedtime.  On account of his medication  noncompliance, he was advised on the benefits of staying on a long-acting injectable antipsychotic medication.  On account of this he was offered haloperidol and after initial titration he was evaluated for efficacy and side effects.  He was notably less internally preoccupied and volunteered that he had gotten lost in the community and could not make it back to [facility] and insisted that this was the reason why he had been on the streets for a couple weeks.  He denied the use of illicit chemicals.  He was started on Haldol Decanoate 25 mg q. 28 days on 2/5/2020 and he tolerated the medication without any untoward effects.  He was subsequently moved to the stepdown unit where he initially spent a lot of time in bed before he began to engage in individual milieu and group therapy.  He continued to elaborate on his delusional themes but these dissipated with time.  His  made referrals for him to be placed at alternate facilities.  He was accepted at [facility] after initial interview but took a while before he got a bed.  On 2/12/2020, the unit  received a voicemail from Kandy at [facility] ([phone]) stating that they have immediate openings. They would be willing to do a phone interview with patient tomorrow.  called back this afternoon and left a message for her indicating that patient is still hospitalized and awaiting IRTS placement.  will call her in the morning to connect about doing the phone interview with patient.  met with patient to update him on the above, and he is agreeable to doing a phone interview tomorrow with [facility].  On 2/13/2020, Psychiatrist completed admission paperwork for [facility] this afternoon and ordered medications from [facility] in [place].  faxed paperwork to [facility] this afternoon as well.  then called patient's county , [place]  ([phone]), to notify her of the above.  The patient will continue to remain compliant on the unit and participated diligently in individual milieu and group therapy.  He was ultimately accepted for admission on 2/18/2020 at [facility].  At the point of discharge the patient denied experiencing any auditory or visual hallucinations and did not endorse any self-harm thoughts plans or intent. called [facility] ([phone] / [phone] fax / [phone] fax) to confirm a follow up medication management appointment for patient before he is discharged. Per the scheduling supervisor, they would like the medication order for the Haldol Decanoate injection to be sent to [facility] in [place] to be filled as they work with this particular pharmacy and this was facilitated. They then scheduled patient for his next injection on the day that it is due, which is Thursday, 03/05/2020 at 1100, with 1030 check in. They have requested that patient's records, including the MAR, be faxed to [facility] in [facility] scheduling at [phone].  informed psychiatrist of the medication request above, and he stated that he would have the Haldol Decanoate ordered at [facility].     Willard Hurley MD    Significant Results and Procedures   Please see below.    Unresulted Labs Ordered in the Past 30 Days of this Admission     No orders found from 12/29/2019 to 1/29/2020.          Code Status   Full Code    Primary Care Physician   Physician No Ref-Primary    Physical Exam   Appearance:  awake, alert, adequately groomed and casually dressed  Attitude:  cooperative  Eye Contact:  good  Mood:  good  Affect:  appropriate and in normal range and mood congruent  Speech:  clear, coherent and normal prosody  Psychomotor Behavior:  no evidence of tardive dyskinesia, dystonia, or tics and intact station, gait and muscle tone  Thought Process:  logical, linear and goal oriented  Associations:  no loose  associations  Thought Content:  no evidence of suicidal ideation or homicidal ideation and no evidence of psychotic thought  Insight: Fair  Judgment: Limited  Oriented to:  time, person, and place  Attention Span and Concentration:  intact  Recent and Remote Memory:  intact  Language: Able to name objects and Able to repeat phrases  Fund of Knowledge: appropriate  Muscle Strength and Tone: normal  Gait and Station: Normal    Time Spent on this Encounter   Willard KIMBALL MD, personally saw the patient today and spent greater than 30 minutes discharging this patient.    Discharge Disposition   Discharged to home  Condition at discharge: Stable    PSYCHIATRY FOLLOW-UP:  You are discharging to [facility]. It is recommended that you complete the program and work with your county  on more permanent community housing.     [facility]  [address] [place] [id number] [phone] / Fax: [phone]    You have a follow up appointment for medication management at [facility] on Thursday, March 5, 2020 at 11:00 am. Please arrive at 10:30 am for check in.  You will be getting your Haldol Decanoate at this appointment as it is due on this day. It is important that you keep this appointment so that you can get your injection.  The medication order for the Haldol Decanoate has been sent to [facility] in [place] per the request of [facility].     [facility] ([facility])  [facility] [address], [address] and [address] [place] [address] [phone] / [phone] fax    You are currently under a provisional discharge of your civil commitment in [facility]. This commitment remains in effect through May 15, 2020 unless it is continued by the court. Under the terms of this commitment, you will need to take all medications as prescribed by the psychiatrist, attend all scheduled appointments with your provider, meet regularly  with your [facility]  and abstain from the use of alcohol, marijuana and all other mood altering substances.     Your [facility]  is [place]red Peter, and she can be reached at [phone].     Your [facility]  is Whit Alycia, and she can be reached at [phone].     Consultations This Hospital Stay   HOSPITALIST IP CONSULT    Discharge Orders   No discharge procedures on file.  Discharge Medications   Discharge Medication List as of 2/18/2020 10:47 AM      START taking these medications    Details   benztropine (COGENTIN) 0.5 MG tablet Take 1 tablet (0.5 mg) by mouth 2 times daily as needed (EPSE), Disp-60 tablet, R-0, E-Prescribe      gabapentin (NEURONTIN) 100 MG capsule Take 1 capsule (100 mg) by mouth 2 times daily, Disp-60 capsule, R-0, E-Prescribe      ibuprofen (ADVIL/MOTRIN) 400 MG tablet Take 1-2 tablets (400-800 mg) by mouth every 6 hours as needed for moderate pain, Disp-120 tablet, R-0, E-Prescribe      magnesium hydroxide (MILK OF MAGNESIA) 400 MG/5ML suspension Take 30 mLs by mouth nightly as needed for constipation, Disp-118 mL, R-0, E-Prescribe      OLANZapine (ZYPREXA) 20 MG tablet Take 1 tablet (20 mg) by mouth At Bedtime, Disp-30 tablet, R-0, E-Prescribe      haloperidol decanoate (HALDOL DECANOATE) 100 MG/ML injection Inject 25 mg into the muscle every 30 days, Disp-0.25 mL, R-0, E-Prescribe         CONTINUE these medications which have CHANGED    Details   omeprazole (PRILOSEC) 20 MG DR capsule Take 1 capsule (20 mg) by mouth daily, Disp-30 capsule, R-0, E-Prescribe      ramelteon (ROZEREM) 8 MG tablet Take 1 tablet (8 mg) by mouth At Bedtime, Disp-30 tablet, R-0, E-Prescribe      traZODone (DESYREL) 50 MG tablet Take 1 tablet (50 mg) by mouth nightly as needed for sleep, Disp-30 tablet, R-0, E-Prescribe         STOP taking these medications       LORazepam (ATIVAN) 1 MG tablet Comments:   Reason for Stopping:         metFORMIN (GLUCOPHAGE) 500  MG tablet Comments:   Reason for Stopping:         OLANZapine zydis (ZYPREXA) 20 MG ODT Comments:   Reason for Stopping:             Allergies   Allergies   Allergen Reactions     Shellfish-Derived Products Rash     Data   Most Recent 3 CBC's:  Recent Labs   Lab Test 01/28/20  1650   WBC 9.9   HGB 14.0   MCV 92         Most Recent 3 BMP's:  Recent Labs   Lab Test 01/28/20  1650      POTASSIUM 4.0   CHLORIDE 109   CO2 31   BUN 8   CR 0.96   ANIONGAP 1*   LORAINE 8.9   GLC 88     Most Recent 2 LFT's:No lab results found.   Panel:No lab results found.  Most Recent 6 Bacteria Isolates From Any Culture (See EPIC Reports for Culture Details):No lab results found.  Most Recent TSH, T4 and A1c Labs:  Recent Labs   Lab Test 01/28/20  1650   TSH 0.81   A1C 5.1     No results found for this or any previous visit.

## 2020-03-05 ENCOUNTER — HOSPITAL ENCOUNTER (EMERGENCY)
Facility: CLINIC | Age: 35
Discharge: HOME | End: 2020-03-05
Attending: EMERGENCY MEDICINE | Admitting: EMERGENCY MEDICINE
Payer: MEDICARE

## 2020-03-05 ENCOUNTER — TELEPHONE (OUTPATIENT)
Dept: BEHAVIORAL HEALTH | Facility: CLINIC | Age: 35
End: 2020-03-05

## 2020-03-05 ENCOUNTER — HOSPITAL ENCOUNTER (INPATIENT)
Facility: CLINIC | Age: 35
LOS: 8 days | Discharge: PSYCHIATRIC OR SUBSTANCE USE FACILITY | DRG: 885 | End: 2020-03-13
Attending: PSYCHIATRY & NEUROLOGY | Admitting: PSYCHIATRY & NEUROLOGY
Payer: MEDICARE

## 2020-03-05 VITALS
WEIGHT: 201 LBS | RESPIRATION RATE: 16 BRPM | HEIGHT: 68 IN | TEMPERATURE: 97.5 F | BODY MASS INDEX: 30.46 KG/M2 | OXYGEN SATURATION: 95 % | DIASTOLIC BLOOD PRESSURE: 61 MMHG | SYSTOLIC BLOOD PRESSURE: 103 MMHG

## 2020-03-05 DIAGNOSIS — F20.2 CATATONIC SCHIZOPHRENIA (H): ICD-10-CM

## 2020-03-05 DIAGNOSIS — F29 PSYCHOSIS, UNSPECIFIED PSYCHOSIS TYPE (H): ICD-10-CM

## 2020-03-05 DIAGNOSIS — F20.0 PARANOID SCHIZOPHRENIA (H): ICD-10-CM

## 2020-03-05 PROCEDURE — 99285 EMERGENCY DEPT VISIT HI MDM: CPT | Mod: 25

## 2020-03-05 PROCEDURE — 25000128 H RX IP 250 OP 636: Performed by: PSYCHIATRY & NEUROLOGY

## 2020-03-05 PROCEDURE — 99221 1ST HOSP IP/OBS SF/LOW 40: CPT | Performed by: PSYCHIATRY & NEUROLOGY

## 2020-03-05 PROCEDURE — 96372 THER/PROPH/DIAG INJ SC/IM: CPT

## 2020-03-05 PROCEDURE — 12400001 ZZH R&B MH UMMC

## 2020-03-05 PROCEDURE — 90791 PSYCH DIAGNOSTIC EVALUATION: CPT

## 2020-03-05 PROCEDURE — 25000132 ZZH RX MED GY IP 250 OP 250 PS 637: Mod: GY | Performed by: EMERGENCY MEDICINE

## 2020-03-05 RX ORDER — IBUPROFEN 200 MG
400-800 TABLET ORAL EVERY 6 HOURS PRN
Status: CANCELLED | OUTPATIENT
Start: 2020-03-05

## 2020-03-05 RX ORDER — BISACODYL 10 MG
10 SUPPOSITORY, RECTAL RECTAL DAILY PRN
Status: DISCONTINUED | OUTPATIENT
Start: 2020-03-05 | End: 2020-03-13 | Stop reason: HOSPADM

## 2020-03-05 RX ORDER — OLANZAPINE 5 MG/1
20 TABLET ORAL AT BEDTIME
Status: DISCONTINUED | OUTPATIENT
Start: 2020-03-05 | End: 2020-03-05 | Stop reason: HOSPADM

## 2020-03-05 RX ORDER — GABAPENTIN 100 MG/1
100 CAPSULE ORAL 2 TIMES DAILY
Status: DISCONTINUED | OUTPATIENT
Start: 2020-03-05 | End: 2020-03-13 | Stop reason: HOSPADM

## 2020-03-05 RX ORDER — BENZTROPINE MESYLATE 0.5 MG/1
0.5 TABLET ORAL 2 TIMES DAILY PRN
Status: DISCONTINUED | OUTPATIENT
Start: 2020-03-05 | End: 2020-03-13 | Stop reason: HOSPADM

## 2020-03-05 RX ORDER — HYDROXYZINE HYDROCHLORIDE 25 MG/1
25 TABLET, FILM COATED ORAL EVERY 4 HOURS PRN
Status: DISCONTINUED | OUTPATIENT
Start: 2020-03-05 | End: 2020-03-13 | Stop reason: HOSPADM

## 2020-03-05 RX ORDER — RAMELTEON 8 MG/1
8 TABLET ORAL AT BEDTIME
Status: CANCELLED | OUTPATIENT
Start: 2020-03-05

## 2020-03-05 RX ORDER — BENZTROPINE MESYLATE 0.5 MG/1
0.5 TABLET ORAL 2 TIMES DAILY PRN
Status: CANCELLED | OUTPATIENT
Start: 2020-03-05

## 2020-03-05 RX ORDER — ACETAMINOPHEN 325 MG/1
650 TABLET ORAL EVERY 4 HOURS PRN
Status: DISCONTINUED | OUTPATIENT
Start: 2020-03-05 | End: 2020-03-13 | Stop reason: HOSPADM

## 2020-03-05 RX ORDER — TRAZODONE HYDROCHLORIDE 50 MG/1
50 TABLET, FILM COATED ORAL
Status: DISCONTINUED | OUTPATIENT
Start: 2020-03-05 | End: 2020-03-13 | Stop reason: HOSPADM

## 2020-03-05 RX ORDER — IBUPROFEN 200 MG
400-800 TABLET ORAL EVERY 6 HOURS PRN
Status: DISCONTINUED | OUTPATIENT
Start: 2020-03-05 | End: 2020-03-13 | Stop reason: HOSPADM

## 2020-03-05 RX ORDER — GABAPENTIN 100 MG/1
100 CAPSULE ORAL 2 TIMES DAILY
Status: CANCELLED | OUTPATIENT
Start: 2020-03-05

## 2020-03-05 RX ORDER — RAMELTEON 8 MG/1
8 TABLET ORAL AT BEDTIME
Status: DISCONTINUED | OUTPATIENT
Start: 2020-03-05 | End: 2020-03-13 | Stop reason: HOSPADM

## 2020-03-05 RX ORDER — TRAZODONE HYDROCHLORIDE 50 MG/1
50 TABLET, FILM COATED ORAL
Status: CANCELLED | OUTPATIENT
Start: 2020-03-05

## 2020-03-05 RX ORDER — ALUMINA, MAGNESIA, AND SIMETHICONE 2400; 2400; 240 MG/30ML; MG/30ML; MG/30ML
30 SUSPENSION ORAL EVERY 4 HOURS PRN
Status: DISCONTINUED | OUTPATIENT
Start: 2020-03-05 | End: 2020-03-13 | Stop reason: HOSPADM

## 2020-03-05 RX ORDER — OLANZAPINE 10 MG/1
20 TABLET ORAL AT BEDTIME
Status: DISCONTINUED | OUTPATIENT
Start: 2020-03-05 | End: 2020-03-06

## 2020-03-05 RX ORDER — OLANZAPINE 10 MG/2ML
10 INJECTION, POWDER, FOR SOLUTION INTRAMUSCULAR
Status: DISCONTINUED | OUTPATIENT
Start: 2020-03-05 | End: 2020-03-13 | Stop reason: HOSPADM

## 2020-03-05 RX ORDER — OLANZAPINE 10 MG/1
10 TABLET ORAL
Status: DISCONTINUED | OUTPATIENT
Start: 2020-03-05 | End: 2020-03-13 | Stop reason: HOSPADM

## 2020-03-05 RX ORDER — TRAZODONE HYDROCHLORIDE 50 MG/1
50 TABLET, FILM COATED ORAL ONCE
Status: COMPLETED | OUTPATIENT
Start: 2020-03-05 | End: 2020-03-05

## 2020-03-05 RX ORDER — OLANZAPINE 10 MG/1
20 TABLET ORAL AT BEDTIME
Status: CANCELLED | OUTPATIENT
Start: 2020-03-05

## 2020-03-05 RX ORDER — RAMELTEON 8 MG/1
8 TABLET ORAL ONCE
Status: COMPLETED | OUTPATIENT
Start: 2020-03-05 | End: 2020-03-05

## 2020-03-05 RX ORDER — HALOPERIDOL DECANOATE 50 MG/ML
50 INJECTION INTRAMUSCULAR
Status: COMPLETED | OUTPATIENT
Start: 2020-03-05 | End: 2020-03-05

## 2020-03-05 RX ADMIN — TRAZODONE HYDROCHLORIDE 50 MG: 50 TABLET ORAL at 17:49

## 2020-03-05 RX ADMIN — OLANZAPINE 20 MG: 5 TABLET, FILM COATED ORAL at 02:46

## 2020-03-05 RX ADMIN — RAMELTEON 8 MG: 8 TABLET ORAL at 17:48

## 2020-03-05 RX ADMIN — HALOPERIDOL DECANOATE 50 MG: 100 INJECTION INTRAMUSCULAR at 10:19

## 2020-03-05 ASSESSMENT — ACTIVITIES OF DAILY LIVING (ADL)
SWALLOWING: 0-->SWALLOWS FOODS/LIQUIDS WITHOUT DIFFICULTY
TRANSFERRING: 0-->INDEPENDENT
TOILETING: 0-->INDEPENDENT
RETIRED_EATING: 0-->INDEPENDENT
COGNITION: 2 - DIFFICULTY WITH ORGANIZING THOUGHTS
BATHING: 0-->INDEPENDENT
DRESS: 0-->INDEPENDENT
AMBULATION: 0-->INDEPENDENT
RETIRED_COMMUNICATION: 0-->UNDERSTANDS/COMMUNICATES WITHOUT DIFFICULTY
FALL_HISTORY_WITHIN_LAST_SIX_MONTHS: NO

## 2020-03-05 ASSESSMENT — MIFFLIN-ST. JEOR
SCORE: 1821.23
SCORE: 1727.34

## 2020-03-05 NOTE — ED PROVIDER NOTES
ED course:  Patient received as a handoff from my partner Dr. Garibay.  On face-to-face evaluation patient reports no additional complaints.  Patient is on a 72-hour hold and was accepted for admission to the [facility] unit    Impression:    ICD-10-CM    1. 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

## 2020-03-05 NOTE — ED PROVIDER NOTES
"  History     Chief Complaint:  Psychiatric Evaluation     HPI The history is limited due to the patient's psychiatric disorders.     Hermann Arita is a 34 year old male with a history of bipolar 1 disorder, schizophrenia, and polysubstance abuse who presents via PD for psychiatric evaluation. The patient was recently admitted to the hospital between 1/28/2020 and 2/18 for schizophrenia in acute phase and cannabis use disorder. It is unclear where the patient has been staying since being discharged from the hospital, although he was arranged to stay at the [facility] upon discharge. Tonight, the patient was found wandering around a [facility] and the police were called to evaluate him. The patient was not answering the police's questions but there was some concern that he has not been eating. The patient was then brought into the ED for evaluation. Here in the ED, the patient reports that he has been taking his medications but does not answer where he has been staying and requests to be taken to a gas station. He is unable to provide additional history.     Allergies:  NKDA      Medications:    Cogentin  Gabapentin  Haldol decanoate   Ibuprofen   Milk of magnesia   Zyprexa  Omeprazole  Ramelteon   Trazodone     Past Medical History:    Psychosis   Disorganized behavior   Bipolar 1 disorder  Anxiety  Schizophrenia   Polysubstance abuse     Past Surgical History:    History reviewed. No pertinent past surgical history.     Family History:    History reviewed. No pertinent family history.     Social History:  Tobacco use:    Light tobacco smoker   Alcohol use:    Negative   Marital status:    Single   Accompanied to ED by:  Police      Review of Systems   Unable to perform ROS: Psychiatric disorder     Physical Exam   First Vitals:  BP: (!) 148/102  Heart Rate: 87  Temp: 97.5  F (36.4  C)  Resp: 18  Height: 172.7 cm (5' 8\")  Weight: 91.2 kg (201 lb)  SpO2: 96 %      Physical Exam  Eye:  Pupils are equal, round, and " reactive.  Extraocular movements intact.    ENT:  No rhinorrhea.  Moist mucus membranes.  Normal tongue and tonsil.    Cardiac:  Regular rate and rhythm.  No murmurs, gallops, or rubs.    Pulmonary:  Clear to auscultation bilaterally.  No wheezes, rales, or rhonchi.    Abdomen:  Positive bowel sounds.  Abdomen is soft and non-distended, without focal tenderness.    Musculoskeletal:  Normal movement of all extremities without evidence for deficit.    Skin:  Warm and dry without rashes.    Neurologic:  Non-focal exam without asymmetric weakness or numbness.     Psychiatric:  The patient has poor eye contact and an odd affect. Responding to questions in short pressured bursts that are at times unintelligible.     Emergency Department Course     Interventions:  0246 Zyprexa 20 mg PO     Emergency Department Course:  Patient was brought to the ED via PD.     Nursing notes and vitals reviewed.  0201: I performed an exam of the patient as documented above.     0208: I spoke to central [facility] regarding the patient.     The patient will board in the ED pending bed availability.     Impression & Plan      Medical Decision Making:  This unfortunate 34-year-old man with diagnosed gets of hernia with a recent admission with associated commitment by [facility] presents to us after being found wandering about a local pharmacy unable to care for himself.  He appears disheveled and homeless.  From review of his discharge summary, he was to be staying at the [facility] and it is unclear when he was either discharged or eloped from this facility as no one is available in the middle of the night.    The patient is sitting calmly enough on the bed.  He will follow commands.  However, he speaks very quietly under his breath in a pressured way.  Some of his answers are nonsensical and he is mostly staring off into space and not interacting.  He also seems to be responding to internal stimuli, having conversations with the wall or  with the door.  He does not appear to be violent.    His vital signs are all reassuring.  He had a very thorough medical work-up when he was admitted last month.  When asked whether he is taking his medications he is unable to appropriately respond.  Therefore, I gave him doses of his nighttime meds.  It is clear that he is unable to be discharged and therefore I placed him on a 72-hour hold.  I personally spoke with [facility] to start the process of getting him a bed.  I have placed an inpatient psychiatry consult.  There are no beds currently available.  Therefore, the patient was signed out to my colleague at the end of my shift until a bed can be procured.    Diagnosis:    ICD-10-CM    1. 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       I, Theron Stock, am serving as a scribe at 2:01 AM on 3/5/2020 to document services personally performed by Dr. Trierweiler, based on my observations and the provider's statements to me.      EMERGENCY DEPARTMENT       Trierweiler, Chad A, MD  03/05/20 [id number]

## 2020-03-05 NOTE — PROGRESS NOTES
I spoke with Robyn Lang at [facility] @ [phone].  She reports that they will hold pt's bed for 3 days.   I also spoke with pt's Hilario Co , [place] @ [phone] who is in agreement with pt's admission to  and hopeful return to [facility].  She will not be revoking his stay of commitment at this time.

## 2020-03-05 NOTE — ED NOTES
Patient does not make eye contact, talks quietly to the walls. Does not appear violent or  Aggressive at this time.

## 2020-03-05 NOTE — TELEPHONE ENCOUNTER
Patient cleared and ready for behavioral bed placement: Yes     S: MD called to request placement for a 35 y/o male in the [facility] ED presenting for a psychiatric evaluation.    B: Hx schizophrenia, psychosis.  BIB police after he was found in a quasi-catatonic state in a [facility].  Pt was admitted to [facility] in February and was accepted to [facility].  Police reported the pt has not eaten in days and may have eloped from the program.    MD reported pt is malodorous and has been observed talking to the walls in the ED.     A: [place].  Commitment through [facility].  No medical concerns  Labs have not been ordered at this time.    R: Placed on waitlist.  MD notified.    0615 [place] CSW will review.  ED notified.  Pt will be seen by the DEC on day shift.      0626 Pt has been placed on a 72 hr hold.  Hold paperwork, Ambulance PCS and [facility] transfer form received from [facility].  Awaiting unsigned HPI via fax.

## 2020-03-05 NOTE — CONSULTS
"Consult Date:  03/05/2020      PSYCHIATRY CONSULTATION      REASON FOR CONSULTATION:  Decompensated schizophrenia.      REQUESTING PHYSICIAN:  Chad A. Trierweiler, MD      IDENTIFYING DATA AND REASON FOR REFERRAL:  Hermann Arita is a 34-year-old single father of none who was recently discharged from [facility] on 02/18/2020 to [facility] following a fairly long hospitalization.  He reportedly was picked up by the police department from a local [employer], where he had been found wandering last night.  He reportedly was not answering questions from the police officers and there was some concern that he had not been eating.  He was brought to the ED, where he remained minimally communicative; hence, this psychiatric consultation.  Information was gathered through direct patient contact in addition to collateral data gathered from [facility] by the DEC .      CHIEF COMPLAINT:  \"I'm doing well.  I have been taking my medications.  I don't know why they are freaking out about me.\"      HISTORY OF PRESENT ILLNESS:  Mr. Arita has established history of schizophrenia and has had multiple psychiatric hospitalizations and is currently under civil commitment with a Redman order for Zyprexa, Risperdal, Clozapine and haloperidol.  He was started on haloperidol decanoate 25 mg IM q.4 weeks during his last hospitalization and on account of his compliance with prescribed medications, his  did not revoke his commitment.  He did fairly well during that course of treatment and was successfully discharged to [facility].  Collateral data provided by [facility] suggests that he had been taking his medications and had kept his outpatient appointments.  Review of records on Epic suggests that he did keep his appointment at [facility] and was maintained on his discharge medications.  Staff at his residence reported to the DEC  that the patient had been very reserved and kept " to himself, responding very minimally to queries, but had remained medication compliant.  Of note, the patient is due for his Haldol Decanoate injection today.  Following a review of his living quarters, staff at his group home decided that he had not demonstrated adequate ability to care for himself on account of the reported state of his room and his bathroom.  On direct interview,  the patient denies experiencing auditory or visual hallucinations, but he was dismissive and appears somewhat internally preoccupied, claiming he just wanted to sleep.  He did not endorse the presence of auditory or visual hallucinations and denied any thoughts of harming himself or others.  However, on account of his elopement from the house last night and the fact that he had not been caring for himself appropriately, his residence decided that they would rather have him evaluated in the hospital before taking him back.  A urine drug screen was not done in the ED, so it is unclear if the patient has used any chemicals.      PAST PSYCHIATRIC HISTORY:  As indicated above, the patient has had multiple psychiatric hospitalizations, with the most recent being on this unit from 01/28/2020 to 02/18/2020 with diagnosis of schizophrenia in acute phase, cannabis use disorder, GERD without esophagitis and mild intermittent asthma.  He is currently under civil commitment and Redman order and he reportedly also has a  and county .      CHEMICAL USE HISTORY:  The patient has history of abusing marijuana, methamphetamines, heroin and bath salts.  He has been in CD treatment in the past.      PAST MEDICAL HISTORY:  GERD, mild intermittent asthma.      PAST SURGICAL HISTORY:  None reported.      MEDICATIONS PRIOR TO ADMISSION:  Cogentin 0.5 mg b.i.d. p.r.n. EPSE, gabapentin 100 mg p.o. b.i.d., Haldol Decanoate 25 mg IM every 30 days, next dose due 03/05/2020, Advil 400 mg 1-2 mg every 6 hours p.r.n. moderate pain, milk of  magnesia 30 mL p.o. at bedtime p.r.n. constipation, Zyprexa 20 mg p.o. at bedtime, Prilosec 20 mg p.o. daily, Rozerem 8 mg p.o. each at bedtime, Desyrel 50 mg p.o. at bedtime p.r.n. insomnia.      ALLERGIES:  SHELLFISH-DERIVED PRODUCTS.      FAMILY PSYCHIATRIC HISTORY:  None reported.      SOCIAL HISTORY:  Previous records suggest that the patient was raised in [place].  He reportedly is single.  It is unclear if he does have children, but records suggest he has 2 children.  He reportedly has been homeless for a number of years before he was placed recently at [facility] and subsequently at [facility].  He reportedly completed 9 years of education and did not participate in special education classes.  He has had run-ins with the law, including charges for disorderly conduct and tampering with motor vehicles, panhandling and lewd behavior.  He has no  history.      REVIEW OF SYSTEMS:  This could not be completed on account of the patient's lack of cooperation.      VITAL SIGNS:  Blood pressure 148/102, pulse 87, respirations 18, temperature 97.5, weight 201 pounds.      MENTAL STATUS EXAMINATION:  This is a middle-aged man who appears his stated age of 34.  He is dressed in hospital scrubs and makes fair eye contact.  He is minimally verbal, but responds to queries.  He recognizes this writer from his most recent hospitalization.  He denies the presence of self-harm thoughts, plans or intent.  He does not endorse the presence of auditory or visual hallucinations.  He is somewhat dismissive, claiming he wants to sleep.  His thought process is difficult to assess on account of his minimal cooperation with this assessment.  He is aware he is in the hospital, but he does not respond to queries regarding the day or time.  Risk assessment at this time is considered moderate to high.      DIAGNOSTIC IMPRESSION:  Hermann Arita is a 34-year-old single man with an established history of mental illness and  chemical use problems, who went AWAL from his group home yesterday and was found at a local [employer] wondering about aimlessly, on account of which the police department was called.  Upon interrogation, he refused to answer the questions from the police officers and so he was brought to the ED for evaluation.  There was concern about his nutritional state.  Laboratory data from his group home suggest that they will not take him back until he is evaluated as an inpatient.  The initial plan was for him to receive Haldol Decanoate 50 mg IM in the EGD, be observed and discharged back to his group home, but they declined this plan.  Consequently, he will be admitted to station 77.      ADMITTING DIAGNOSES:   1.  Schizophrenia.   2.  History of stimulant use disorder.   3.  History of alcohol use disorder.   4.  Gastroesophageal  reflux disease.   5.  Mild intermittent asthma.      PLAN:  Admit the patient to station 77 under Dr. Diallo.  Start IM Haldol Decanoate 50 mg now.  Continue other outpatient medications.         SYED DIALLO MD             D: 2020   T: 2020   MT: JOSE      Name:     RALPH BAE   MRN:      [id number]        Account:       [id number]   :      1985           Consult Date:  2020      Document: [id number]

## 2020-03-05 NOTE — ED NOTES
Pt repositions independently, Dr. Hurley assessing for admit.  Pt engaged in conversation with him.

## 2020-03-05 NOTE — ED TRIAGE NOTES
Patient brought in by Yareli GOLDBERG. Found in [facility]. PD said that patient has not eaten in days. Patient is not currently verbal, noods his head occasionally with closed ended questions. Cooperative with repeated questions. No apparent injuries. Patient has twiches in his has and facial muscles. Patient nooded when asked if he was safe in the room. Shook his head when asked if he was going to remain cooperative. In [place] per PD and new [place] per Writer.    Video Observation initiated, patient informed.     Janak Rowley RN

## 2020-03-05 NOTE — ED NOTES
"Writer offered pt breakfast as writer took VS.  Pt stated \"I want to get out of the hospital, things are only going to get worse if I don't.\"    Pt remains on an SAMUEL with staff and security watch.  Hot breakfast ordered by writer.  Pharmacist did med rec  "

## 2020-03-05 NOTE — ED NOTES
"Pt set up with hot breakfast, lights turned up in room.  Pt stated \"I just want to sleep.\"  Lights left on.  Remains on SAMUEL with staff and security watch  "

## 2020-03-05 NOTE — ED NOTES
Writer assumed care.  Pt on a 72 hour hold.  Notice of rights and 72 hour hold placed with pt belongings,  Writer covered pt with blanket.  Pt woke easily.  Staff and security watch

## 2020-03-05 NOTE — ED NOTES
Pt resting.  Writer took call from Juana Courtney, DAVID Therapist at the [facility] where pt resides.  Juana stated that her and Robyn Lang, DAVID Therapist also at [facility] have collateral information on pt.  Contact # [phone]

## 2020-03-05 NOTE — ED NOTES
Patient awoken and agreed to take his medications. Informed that RN attempted to call report to [facility] however they are not ready for his transport yet. Will continue to monitor until that time.

## 2020-03-05 NOTE — ED NOTES
"At this time patient took oral Zyprexa. Pt was verbal with writer, he said \"the creator loves you.\" While RN was in the room, he seemed to be talking to himself quietly. Face was angry, but he was calm and cooperative. Will continue to monitor.   "

## 2020-03-05 NOTE — TELEPHONE ENCOUNTER
0800 S:  [facility].  They will update intake if that changes.    ED and DEC staff informed.    R:  Awaiting appropriate placement      Was set up to admit to 77, but then the unit needed to close to admissions.    -   Admit to 32    / Samara Pascual accepts    72 hr hold for emergency admission      -  2:50 PM  Ros Young, informed  -  3:00 PM  [facility] DEC informed    Awaiting DC

## 2020-03-05 NOTE — ED NOTES
Report received. Patient visualized. RN requested to take patients vital signs. Patient initially refused however eventually agreed to do so. Patient informed that his wife would like him to call her. Patient refused. Bed placement has been found for patient. RN can call report after 1700 as requested. Will continue to monitor until that time.

## 2020-03-05 NOTE — ED NOTES
Patient is resting in bed. Awaiting admission. MD now at bedside. Will continue to monitor. Prior to transport.

## 2020-03-05 NOTE — ED NOTES
"Pt allowed writer to get repeat BP. When writer asked if pt would like to order lunch meal tray, pt responded with \"quit fucking with me, percy\". Pt did allow writer to remove BP cuff.   "

## 2020-03-06 PROCEDURE — 12400001 ZZH R&B MH UMMC

## 2020-03-06 PROCEDURE — 25000132 ZZH RX MED GY IP 250 OP 250 PS 637: Mod: GY | Performed by: NURSE PRACTITIONER

## 2020-03-06 PROCEDURE — 99223 1ST HOSP IP/OBS HIGH 75: CPT | Mod: AI | Performed by: NURSE PRACTITIONER

## 2020-03-06 RX ORDER — OLANZAPINE 10 MG/2ML
10 INJECTION, POWDER, FOR SOLUTION INTRAMUSCULAR AT BEDTIME
Status: DISCONTINUED | OUTPATIENT
Start: 2020-03-06 | End: 2020-03-13 | Stop reason: HOSPADM

## 2020-03-06 RX ORDER — OLANZAPINE 10 MG/1
30 TABLET ORAL AT BEDTIME
Status: DISCONTINUED | OUTPATIENT
Start: 2020-03-06 | End: 2020-03-13 | Stop reason: HOSPADM

## 2020-03-06 RX ADMIN — OLANZAPINE 30 MG: 10 TABLET, FILM COATED ORAL at 22:03

## 2020-03-06 RX ADMIN — OMEPRAZOLE 20 MG: 20 CAPSULE, DELAYED RELEASE ORAL at 12:29

## 2020-03-06 RX ADMIN — GABAPENTIN 100 MG: 100 CAPSULE ORAL at 12:29

## 2020-03-06 RX ADMIN — RAMELTEON 8 MG: 8 TABLET ORAL at 22:04

## 2020-03-06 RX ADMIN — GABAPENTIN 100 MG: 100 CAPSULE ORAL at 22:03

## 2020-03-06 NOTE — H&P
This patient was seen and evaluated by me. I have reviewed and edited the documentation recorded by the student. The documentation accurately reflects the services I personally performed and the treatment decisions made by me.  KARINE Washburn CNP      History and Physical    Hermann Arita MRN# [id number]   Age: 34 year old YOB: 1985     Date of Admission:  3/5/2020          Contacts:     IRTS - [facility] ([phone])     [facility]  - Mildred Green ([phone])     Psychiatry - Lavonia KARINE Toney, CNP - [facility] [address] [place]  - Whit Tong  ([phone])     [facility]  - Whitley Bowie  ([phone] ext. [phone])     Mother - Princess Patel ([phone])          Diagnosis:     Schizophrenia, in acute phase  Cannabis use disorder  History of stimulant use disorder  Gastroesophageal reflux disease  Mild intermittent asthma         Recommendations:     Admit to Unit: 32N     Attending Physician: Dr. Bullard, under the direct care of Samara Hodges NP     Patient is on a 72-hour mental health hold which expires 3/10 at 0530.  He is under ongoing MI commitment in [facility] until 5/15/2020.  He is Jarvised for Zyprexa, Risperdal, Clozaril and Haldol.   has indicated his PD will not be revoked; this will need to be reassessed Monday based on his cooperation over the weekend.     Routine lab studies have been requested.  He has refused to provide a specimen for UTOX.     Monitor for target symptoms.      Provide a safe environment and therapeutic milieu.      Medications:  Haldol Deanoate 50 mg was administered 3/5 (increased from 25 mg the month prior).  Continue Rozerem 8 mg at HS.  Increase Zyprexa to 30 mg at HS with a back up of IM per Feroz.  Continue Neurontin 100 mg BID.  Continue PRN Trazodone.  PRNs of Zyprexa and Hydroxyzine are available.       Disposition to be  "determined.  [facility] has indicated they will hold his bed for only a few days.       Transfer care to Dr. Cordova.      Clinical Global Impressions  First:  Considering your total clinical experience with this particular patient population, how severe are the patient's symptoms at this time?: 7 (03/06/20 1348)  Compared to the patient's condition at the START of treatment, this patient's condition is:: 4 (03/06/20 1348)  Most recent:  Considering your total clinical experience with this particular patient population, how severe are the patient's symptoms at this time?: 7 (03/06/20 1348)  Compared to the patient's condition at the START of treatment, this patient's condition is:: 4 (03/06/20 1348)      Attestation:  Patient has been seen and evaluated by me, Georgina Barton, NP student along KARINE Denson CNP.  The patient was counseled on nature of illness and treatment plan/options.  Care was coordinated with the treatment team.         Chief Complaint:     History is obtained from the patient and review of medical records.     \"  I am working on something \"            History of Present Illness:         Hermann Arita is a 34 year old male with prior diagnosis of schizophrenia and cannabis use disorder. He has had prior mental health hospitalizations with the most recent one at [facility] from 1/28/2020 through 2/18/2020.  He is currently under commitment and Redman following his admission in Dec 2019.  As pertains to this admission, the patient was admitted to [address] on a 72-hour hold  through [facility] ED. The patient had presented to the ED brought in by police after he was reportedly picked up by the police department from a local [facility], where he had been found wandering the night prior.   He had been at [facility].  Staff there reported he had been compliant with his medications but was isolative, and his room was in disarray.  On first attempt to meet with him this morning, he suggested " "a later time as he wanted to sleep.  On the second approach just before noon, he is under the covers with eyes closed and refuses to meet.  He provided minimal responses to a few questions regarding mood, irritability and anger.  He did not answer questions on hallucinations, paranoia, suicidal or homicidal ideations.  He was getting increasingly agitated by the questions, raising his voice. He was reminded that he was committed and Jarvised and his Zyprexa dose would likely be increased during this hospitalization, to which he responded with, \"what the F--k is a Redman order?\"  To prevent further escalation in his behavior, the interview was terminated.           Psychiatric Review of Systems:     Patient's mood is irritable.  He is asleep with the covers up to his neck. His eyes are closed and he does not open them or acknowledge this writer. He responds to questions with his eyes closed, and provides mostly one word answers. He denies anger or irritability, though he presents as irritable. \"My mood has been good, listen up and open your ears.\"  He was not invested in answering questions and his irritability grew with each question.              Medical Review of Systems:     A 10-point review of systems was unable to be completed due to his level of cooperation.           Psychiatric History:     The patient has had previous mental health hospitalizations.  He was most recently hospitalized at [facility] from 1/29/2020 through 2/18/2020.  Prior to that he was hospitalized at [facility] in 12/2019 where he was subsequently committed and Jarvised.  His Jarvised medications include Zyprexa, Risperdal, Clozapine, and Haloperidol.  He is currently on commitment through [facility] that runs through May 15th 2020.  Records indicates he has had hospitalizations at [facility].  He received his Haldol Dec 50 mg shot yesterday 3/5/2020 while in the ED. Records indicate he has " been complaint with his other medications while he was [facility]. The patient has yet to adhere to a request for a urine toxicology.  He does have a history of aggressive behavior towards healthcare workers.  No history of suicide attempts.  Past medications include Depakote, Risperdal, Adderall, Hydroxyzine, Remeron and Ativan.             Substance Use History:     Chart review indicates a history of marijuana, meth, K2, heroin and bath salt use.  There is mention of a CD treatment through [facility]. T here is no current UTOX result at this time as the patient is resistive to providing a sample.           Past Medical History:     Mild intermittent asthma  GERD    No history of seizures or head injury.          Past Surgical History:     None per chart review         Allergies:      Allergies   Allergen Reactions     Shellfish-Derived Products Rash              Medications:     Haldol Decanoate 50 mg IM q 30 days (received 3/5/2020)  Cogentin 0.5 mg PO BID PRN EPSE  Neurontin 100 mg PO BID  Ibuprofen 400-800 mg PO q 6 hours PRN pain  MOM 30 mLs PO q HS PRN constipation  Zyprexa 20 mg PO q HS  Prilosec 20 mg PO q day  Rozerem 8 mg PO q HS  Trazodone 50 mg PO q HS PRN insomnia          Social History:     He grew up in [place], [place].  He has 2 older sisters.  His parents  when he was 11 or 12.  He had truancy issues and did not graduate from high school.  He has had some part-time work in the past but has been fired due to poor attendance.  He has been  from his wife for years.  He has a 9-year-old daughter who lives in [place] with his estranged wife and a 3-year-old son he has never met.  He was homeless for years, then stayed at [facility] and is currently at [facility].  Past legal charges include disorderly conduct, tampering with motor vehicles, panhandling and lewd behavior.  He has a PO.  No  history.  He has  "SSDI and a rep payee.         Family History:     His mother abused alcohol.  No known family history of mental illness or suicides.           Labs:     Refused.         Psychiatric Examination:     Appearance:  asleep in bed, dressed in hospital scrubs, and under the covers  Attitude:   uncooperative, uninterested  Eye Contact:  none, did not open eyes  Mood:  irritable, hostile   Affect:  heightened intensity   Speech:  clear, coherent  Psychomotor Behavior:  no evidence of tardive dyskinesia, dystonia, or tics  Thought Process:  unable to thoroughly assess due to minimal participation in the assessment  Associations: unable to assess  Thought Content:  unable to assess due to minimal participation in the assessment  Insight:  poor  Judgment:  limited  Oriented to:  person, unable to assess time, place or date  Attention Span and Concentration:  appears impaired  Recent and Remote Memory:  unable to assess  Language:  intact  Fund of Knowledge:  appropriate  Muscle Strength and Tone:  unable to assess  Gait and Station:  lying in bed, gait not observed    /71   Pulse 78   Temp 96.5  F (35.8  C)   Resp 16   Ht 1.727 m (5' 8\")   Wt 81.8 kg (180 lb 4.8 oz)   SpO2 97%   BMI 27.41 kg/m           Physical Exam:      Please refer to the physical exam completed by Dr. Trierweiler in the [facility], which I have reviewed and have nothing additional to add.          "

## 2020-03-06 NOTE — PLAN OF CARE
BEHAVIORAL TEAM DISCUSSION    Participants: Samara Hodges, TRAV; LASHANDA Irizarry; Ivy LUCIO  Progress: new admission.  Pt is a 34 year old male who presented via EMT.  Hx of Bipolar psychosis and polysubstance abuse.  Recent IP for mental health at [facility] and one at [place].  He was discharged to [facility] but has not been doing well there.  He is on a 72HH and has a commitment and deluna in place.    Anticipated length of stay: TBD  Continued Stay Criteria/Rationale: pt needs further evaluation.  He presents with catatonic sxs.    Medical/Physical: unknown at this time  Precautions:   Behavioral Orders   Procedures     Code 1 - Restrict to Unit     Routine Programming     As clinically indicated     Single Room     Active, acute psychosis     Status 15     Every 15 minutes.     Plan: assess, monitor and stabilize. Will coordinate with OP .     Rationale for change in precautions or plan: new admission.

## 2020-03-06 NOTE — PROGRESS NOTES
03/05/20 2231   Patient Belongings   Did you bring any home meds/supplements to the hospital?  No   Patient Belongings locker;sent to security per site process   Patient Belongings Put in Hospital Secure Location (Security or Locker, etc.) shoes;clothing;cash/credit card   Belongings Search Yes   Clothing Search Yes   Second Staff Joaquim RN   Sent to safe/security:  $8.37 cash  In pt locker #4:  One change of clothing (pants, shirt, socks), white nike tennis shoes with no laces, black winter jacket.     Pt had no wallet, keys, credit cards, phone or electronics, or jewelry.   A               Admission:  I am responsible for any personal items that are not sent to the safe or pharmacy.  [facility] is not responsible for loss, theft or damage of any property in my possession.    Signature:  _________________________________ Date: _______  Time: _____                                              Staff Signature:  ____________________________ Date: ________  Time: _____      2nd Staff person, if patient is unable/unwilling to sign:    Signature: ________________________________ Date: ________  Time: _____     Discharge:  [facility] has returned all of my personal belongings:    Signature: _________________________________ Date: ________  Time: _____                                          Staff Signature:  ____________________________ Date: ________  Time: _____

## 2020-03-06 NOTE — ED NOTES
Patient has been laying in bed with eyes closed. Patient is awake and responds by nooding. Refused to have VS taken, RN reinforced that he is on a hold and that he will be transported to a higher level of care. Will revital later.

## 2020-03-06 NOTE — PROGRESS NOTES
Pt has been isolative and asleep in his room throughout this shift. Pt did not come out to eat breakfast but did come out and eat lunch. Returned to his room immediately after. When out in milieu, he appears calm and keeps to himself. Pt appearance is disheveled; has neglected grooming.   Pt refused to check-in, therefore this writer is unable to assess SI.     03/06/20 1300   Behavioral Health   Hallucinations other (see comment)  (ZAHRA)   Thinking poor concentration   Orientation person: oriented   Memory other (see comment)  (ZAHRA)   Insight poor   Eye Contact into space   Affect blunted, flat   Mood mood is calm   Physical Appearance/Attire disheveled   Hygiene neglected grooming - unclean body, hair, teeth   Suicidality   (ZAHRA)   Self Injury   (ZAHRA)   Elopement   (None stated/observed)   Activity isolative   Speech coherent   Medication Sensitivity no observed side effects   Psychomotor / Gait balanced;steady

## 2020-03-06 NOTE — PROGRESS NOTES
"Pt arrived on the unit via EMT at 10:20 pm.  He has a h/o bipolar d/o, psychosis and polysubstance abuse.  He had been hospitalized at [facility] and at [facility] int the past few months.  It was reported that Pt was arranged to stay at [facility] but he was found wandering around a [employer] and police were called then brought him to ED for evaluation.  He appears withdrawn and selectively mute at this time.  Transport team reported that Pt had been talking to them during the ride but he is now appearing to not want to speak.  He is alert and follows directions/prompts when repeated.  Appears steady getting off the cart but disorganized.  He did eventually respond to questioning with head nods or occasionally with brief verbal responses.  During check-in with staff he did appear to tense up and posture at times.  He nodded his head \"no\" when asked about thoughts of suicide and plans.  Shook his head \"yes\" when asked if he had taken his evening medications and if he feels he will remain safe on the unit.  Pt was provided a copy of his 72 hour hold.    "

## 2020-03-06 NOTE — PROGRESS NOTES
"CLINICAL NUTRITION SERVICES - ASSESSMENT NOTE     Nutrition Prescription    RECOMMENDATIONS FOR MDs/PROVIDERS TO ORDER:  None at this time    Malnutrition Status:    Unable to determine due to pt declining RD visit    Recommendations already ordered by Registered Dietitian (RD):  None at this time     Future/Additional Recommendations:  - Continue to monitor PO intake and wt trends  - Order supplements if poor PO     REASON FOR ASSESSMENT  Hermann Arita is a/an 34 year old male assessed by the dietitian for Admission Nutrition Risk Screen for reduced oral intake over the last month    NUTRITION HISTORY  Per chart, the pt was admitted last night with the following related diagnosis: schizophrenia, cannabis use disorder, gastroesophageal reflux disease    Pt declined RD visit at this time.     CURRENT NUTRITION ORDERS  Diet: Regular  Intake/Tolerance: Per Psych Associate note from 3/6, pt did not come out to eat breakfast but did come out and eat lunch    LABS  Labs reviewed    MEDICATIONS  Medications reviewed    ANTHROPOMETRICS  Height: 172.7 cm (5' 8\")  Most Recent Weight: 81.8 kg (180 lb 4.8 oz)    IBW: 70 kg (117%)  BMI: 27 kg/m2 Overweight BMI 25-29.9  Weight History: Difficult to assess wt trends when two drastically different wts were recorded on 3/5/20.  Wt Readings from Last 10 Encounters:   03/05/20 81.8 kg (180 lb 4.8 oz)   03/05/20 91.2 kg (201 lb)   00/28/20 86.6 kg (191 lb) (CE)   02/18/20 91.5 kg (201 lb 12.8 oz)     Dosing Weight: 82 kg (actual)     ASSESSED NUTRITION NEEDS  Estimated Energy Needs: 2365-1109 kcals/day (25 - 30 kcals/kg)  Justification: Maintenance  Estimated Protein Needs: 66-82 grams protein/day (0.8 - 1 grams of pro/kg)  Justification: Maintenance  Estimated Fluid Needs: 4910-9957 mL/day (1 mL/kcal)   Justification: Maintenance    PHYSICAL FINDINGS  See malnutrition section below.  Pt declined RD visit so no physical exam could be performed.     MALNUTRITION  % Intake: Decreased " intake does not meet criteria  % Weight Loss: Difficult to assess wt trends when two drastically different wts were recorded on 3/5/20.  Subcutaneous Fat Loss: Unable to assess  Muscle Loss: Unable to assess  Fluid Accumulation/Edema: None noted  Malnutrition Diagnosis: Unable to determine due to pt declining RD visit    NUTRITION DIAGNOSIS  Predicted inadequate nutrient intake related to pt screening for reduced PO and skipping breakfast but eating lunch.     INTERVENTIONS  Implementation  - Nutrition Education: Unable to complete due to pt declining RD visit    - Medical food supplement therapy, pt declined today     Goals  Patient to consume % of nutritionally adequate meal trays TID, or the equivalent with supplements/snacks.     Monitoring/Evaluation  Progress toward goals will be monitored and evaluated per protocol.      Radha Morgan RD, LD  Pager: [phone]

## 2020-03-06 NOTE — PROGRESS NOTES
"  INITIAL PSYCHOSOCIAL ASSESSMENT AND NOTE  I have reviewed the chart met with the patient, and developed Care Plan.  Information for assessment was obtained from: primarily from review of chart as pt was not ready to interact, and [facility] for [facility] , [facility]red Green ([phone])  PRESENTING PROBLEM:  Pt is a 34 year old male who is currently on commitment (PD)  through [facility]  through May 15th 2020. Patient is on a 72-hour mental health hold which expires 3/10 at 0530.  Pt apparently left his placement at [facility] and was picked up by police after he was found at a [employer] appearing to be wandering. Pt has a hx of SPMI including Bipolar Disorder, Schizophrenia, and polysubstance abuse. He has had multiple admissions to mental health units.  The following areas have been assessed:  History of Mental Health and Chemical Dependency:   Per notes, his last inpatient mental health admission was at [facility] 1/28/2020 to 2/18/2020.  per notes from 2019 inpatient stay: Pt \"is currently homeless and returned to [place] sometime between the end of October 2019 and 11/2/19 for reasons unknown.  He came from [place] had previously spent some time in [place] and lived with his father for a while.  His mom last saw him in [facility] [place] this year.  She reported that Hermann reached out to his former  for assistance and they had also met with his PO officer who offered a bed at a homeless shelter, but patient declined.   Prior  diagnoses  schizophrenia and bipolar disorder and marijuana use.   He has been evaluated at least 2 times by [facility] in 2016 and 2017.  In July 2016 he had checked himself into [facility] on a voluntary basis due to \"delusions that his brain was removed.\"  Patient was on a stayed commitment in 2016 and it was extended until October of 2017 due to dismissal because pt had moved to [place] to live with his father.. In 2016 he was placed at [facility] due to his behaviors and mental health. Records " "also indicates that his has been previously committed in [place].  They also indicates that he had been working with mental health providers through [facility].  He has received  case mgmt services through [employer].\"  He has had stays at [facility] and [facility]   Living Situation: pt was most recently in treatment at [facility].  He has a hx of homelessness.  came to [place] between 10/31/19 and 11/2/19.  Significant Life Events (Illness, Abuse, Trauma, Death): He has a 9-year-old daughter who lives in [place] with his estranged wife and a 3-year-old son he has never met.  Family Description (Constellation, Family Psychiatric History): was born and raised by his parents in [place] and [place], [place].  He has two older sisters.  His parents  when he was about `[age] yrs old.  Some time after the divorce he was court ordered to live with his father due to truancy issues and skipping school when he lived with his mother. Eventually he returned to live with his mother.  He was placed in foster care when he was 13/15 yrs old.   Hermann has been  and  from his wife for many years and she is seeking a divorce.  They have they have a nine yr old daughter who lives in [place] with her mom. Hermann also has a son who is about 3 yrs old, who he has never seen.    No family history of mental illness.  There is history of Alcoholism on his maternal side of the family.  Financial Status: SSDI he started receiving 2 or 3 yrs ago. And has Rep Payee services in place.   Occupational History: unemployed for the past four to five years.  Hx of gaining employment then not showing up for work.  Educational Background: did not graduate from high school or earn his GED.  started skipping school after his parents .   Truancy courts were involved.     Service History: none per prior hx  Legal Issues: currently under commitment.  He has a   Ethnic/Cultural Issues: will assess " at a later date  Spiritual Orientation: unable to assess at this time.  Social Functioning (organizations, interests): inability to function and care for himself.  Per collateral information, Hermann does fine when he is supervised and taking medications.  Once he stops his medication he severely decompensates.  Based on the report, it seems that he has an inability to verbally communicate when decompensated, as his mother made reference to this to the PO.  Current Treatment providers:   IR - [facility] ([phone])   [facility]  - [facility] ([phone])   Psychiatry - KARINE Pandey, CNP - [facility] [address] [place]  - Whit Tong  ([phone])   Former [facility]  - Whitley Bowie  ([phone] ext. [phone])  Social Service Assessment/Plan:   Patient will have psychiatric assessment and medication management by the psychiatrist. Medications will be reviewed and adjusted per MD as indicated. The treatment team will continue to assess and stabilize the patient's mental health symptoms with the use of medications and therapeutic programming. Hospital staff will provide a safe environment and a therapeutic milieu. Staff will continue to assess patient as needed. Patient will be encouraged to participate in unit groups and activities. Patient will receive individual and group support on the unit.  CTC will do individual inpatient treatment planning and after care planning. CTC will discuss options for increasing community supports with the patient. CTC will coordinate with outpatient providers and will place referrals to ensure appropriate follow up care is in place.

## 2020-03-06 NOTE — ED NOTES
Patients meal tray delivered. Patient remains resting in bed with eyes closed. RN awaiting call to give report to [facility]. Will continue to monitor

## 2020-03-07 PROCEDURE — 25000132 ZZH RX MED GY IP 250 OP 250 PS 637: Mod: GY | Performed by: NURSE PRACTITIONER

## 2020-03-07 PROCEDURE — 80061 LIPID PANEL: CPT | Performed by: NURSE PRACTITIONER

## 2020-03-07 PROCEDURE — 12400001 ZZH R&B MH UMMC

## 2020-03-07 RX ADMIN — OMEPRAZOLE 20 MG: 20 CAPSULE, DELAYED RELEASE ORAL at 08:38

## 2020-03-07 RX ADMIN — OLANZAPINE 30 MG: 10 TABLET, FILM COATED ORAL at 22:16

## 2020-03-07 RX ADMIN — GABAPENTIN 100 MG: 100 CAPSULE ORAL at 22:16

## 2020-03-07 RX ADMIN — RAMELTEON 8 MG: 8 TABLET ORAL at 22:16

## 2020-03-07 RX ADMIN — GABAPENTIN 100 MG: 100 CAPSULE ORAL at 08:38

## 2020-03-07 ASSESSMENT — ACTIVITIES OF DAILY LIVING (ADL)
ORAL_HYGIENE: PROMPTS
HYGIENE/GROOMING: PROMPTS
HYGIENE/GROOMING: PROMPTS
DRESS: SCRUBS (BEHAVIORAL HEALTH);PROMPTS
ORAL_HYGIENE: PROMPTS

## 2020-03-07 NOTE — PROGRESS NOTES
"Patient states \"I don't need labs right now, I am okay.\"  This writer attempted to explain rational and patient continued to refuse.  Taylor, with lab informed.  The lab will call unit to attempt again tomorrow.  "

## 2020-03-07 NOTE — PROGRESS NOTES
Patient spent entire day in bed, sleeping.  Awoke briefly when told breakfast and lunch arrived.  Declined meal trays thus far today.  Declined to have morning vital signs taken.  Calm, quiet, isolative.

## 2020-03-08 PROCEDURE — 25000132 ZZH RX MED GY IP 250 OP 250 PS 637: Mod: GY | Performed by: NURSE PRACTITIONER

## 2020-03-08 PROCEDURE — 12400001 ZZH R&B MH UMMC

## 2020-03-08 RX ADMIN — OMEPRAZOLE 20 MG: 20 CAPSULE, DELAYED RELEASE ORAL at 07:58

## 2020-03-08 RX ADMIN — OLANZAPINE 30 MG: 10 TABLET, FILM COATED ORAL at 21:21

## 2020-03-08 RX ADMIN — GABAPENTIN 100 MG: 100 CAPSULE ORAL at 21:21

## 2020-03-08 RX ADMIN — GABAPENTIN 100 MG: 100 CAPSULE ORAL at 07:58

## 2020-03-08 ASSESSMENT — ACTIVITIES OF DAILY LIVING (ADL)
LAUNDRY: WITH SUPERVISION
LAUNDRY: WITH SUPERVISION
DRESS: SCRUBS (BEHAVIORAL HEALTH);INDEPENDENT
HYGIENE/GROOMING: INDEPENDENT
DRESS: INDEPENDENT
ORAL_HYGIENE: INDEPENDENT
HYGIENE/GROOMING: INDEPENDENT;SHOWER
ORAL_HYGIENE: INDEPENDENT

## 2020-03-08 NOTE — PLAN OF CARE
48 Hour Assessment:    SI/SIB: Patient denies.  HI:  Patient denies.  AH/VH: Patient denies.  Medication AE:  None observed.  PRNs:  None  Vitals: WNL  ADLs:  Neglected.  Physical complaints:  Pt. Denies.  Sleep:  No issues.  Visits: None  Misc:  Patient has been visible in the milieu, however, does not interact with peers.  Loves to drink lots of coffee.  Needs frequent reminders to refrain from holding his groin area in public through out the shift.  Medication compliant.

## 2020-03-08 NOTE — PROGRESS NOTES
03/08/20 1436   Behavioral Health   Hallucinations denies / not responding to hallucinations   Thinking intact   Orientation place: oriented;person: oriented;date: oriented;time: oriented   Memory baseline memory   Insight poor   Judgement impaired   Eye Contact at examiner   Affect tense   Mood mood is calm   Physical Appearance/Attire neat;attire appropriate to age and situation;appears stated age   Hygiene well groomed   Suicidality   (Denies)   1. Wish to be Dead (Recent) No   2. Non-Specific Active Suicidal Thoughts (Recent) No   Duration (Lifetime) NA   Self Injury   (Denies)   Elopement   (None stated or observed)   Activity isolative;withdrawn   Speech clear;coherent   Medication Sensitivity no observed side effects;no stated side effects   Psychomotor / Gait balanced;steady   Activities of Daily Living   Hygiene/Grooming independent;shower   Oral Hygiene independent   Dress scrubs (behavioral health);independent   Laundry with supervision   Room Organization independent     Pt had an isolative shift. Pt denies, SI, SIB, HI, depression, anxiety, hallucinations and side effects from medications. Pt ate his lunch and immediately went back to his room. Pt also took a shower. During check-in, Pt was calm and cooperative. Pt is currently pacing the hallway back and forth. Pt appears to be tense but is appropriate and respectful.

## 2020-03-09 PROCEDURE — 99232 SBSQ HOSP IP/OBS MODERATE 35: CPT | Performed by: PSYCHIATRY & NEUROLOGY

## 2020-03-09 PROCEDURE — 25000132 ZZH RX MED GY IP 250 OP 250 PS 637: Mod: GY | Performed by: NURSE PRACTITIONER

## 2020-03-09 PROCEDURE — 12400001 ZZH R&B MH UMMC

## 2020-03-09 RX ADMIN — OLANZAPINE 30 MG: 10 TABLET, FILM COATED ORAL at 21:12

## 2020-03-09 RX ADMIN — OMEPRAZOLE 20 MG: 20 CAPSULE, DELAYED RELEASE ORAL at 08:55

## 2020-03-09 RX ADMIN — GABAPENTIN 100 MG: 100 CAPSULE ORAL at 21:12

## 2020-03-09 RX ADMIN — RAMELTEON 8 MG: 8 TABLET ORAL at 21:12

## 2020-03-09 RX ADMIN — GABAPENTIN 100 MG: 100 CAPSULE ORAL at 08:55

## 2020-03-09 ASSESSMENT — ACTIVITIES OF DAILY LIVING (ADL)
ORAL_HYGIENE: INDEPENDENT
HYGIENE/GROOMING: HANDWASHING;INDEPENDENT;PROMPTS
DRESS: SCRUBS (BEHAVIORAL HEALTH);INDEPENDENT

## 2020-03-09 NOTE — PROGRESS NOTES
Call received from [facility] calling from [phone] calling to find out how pt is doing.  Updated her regarding weekend behaviors.   She reports that pt did not shower for the full two plus weeks he was there and most likely was urinating in his room.  She offers that his 'self neglect' was their primary concern, that they are willing to consider readmitting him if he stabilizes.  Gave her this CTC's direct number for contact.

## 2020-03-09 NOTE — PLAN OF CARE
48 Hour Assessment    Vitals: WNL    Pain:  Patient denies.    Physical complaints:  Patient denies.    Sleep:  WNL    ADLs:  Neglected.    LBM:  This morning.    SI/SIB/HI:  Pt. Denies.    AH/VH:  Pt. Denies.    Behavior:  No issues.    Milieu Participation:  Visible, withdrawn from peers.  Pleasant with staff.    Medication AE:  None stated or observed.    PRNs:  None    Visits:  None    Misc:  Hermann spends time between the lounge and bed resting, no interaction with peers.

## 2020-03-09 NOTE — PROGRESS NOTES
"[facility], [place]   Psychiatric Progress Note    Length of stay (days): 5        Interim History:   The patient's care was discussed with the treatment team during the daily team meeting and/or staff's chart notes were reviewed.  Staff report: no acute issues over night.     No complaints today.  He reports moderate anxiety which seems to stem from vague paranoid concerns.  He denied suicidal and homicidal thoughts.  Energy is low, appetite is low, concentration is poor.  Tolerating medications without side effects.         Medications:       gabapentin  100 mg Oral BID     OLANZapine  30 mg Oral At Bedtime    Or     OLANZapine  10 mg Intramuscular At Bedtime     omeprazole  20 mg Oral Daily     ramelteon  8 mg Oral At Bedtime          Allergies:     Allergies   Allergen Reactions     Shellfish-Derived Products Rash          Labs:   No results found for this or any previous visit (from the past 24 hour(s)).       Psychiatric Examination:     /74   Pulse 83   Temp 97  F (36.1  C) (Tympanic)   Resp 16   Ht 1.727 m (5' 8\")   Wt 81.8 kg (180 lb 4.8 oz)   SpO2 97%   BMI 27.41 kg/m    Weight is 180 lbs 4.8 oz  Body mass index is 27.41 kg/m .  Orthostatic Vitals     None            Appearance: awake, alert  Attitude:  guarded  Eye Contact:  poor   Mood:  anxious  Affect:  guarded  Speech:  decreased prosody  Psychomotor Behavior:  no evidence of tardive dyskinesia, dystonia, or tics  Throught Process:  linear  Associations:  no loose associations  Thought Content:  no evidence of suicidal ideation or homicidal ideation, auditory hallucinations present and Paranoid delusions present  Insight:  limited  Judgement:  limited  Oriented to:  time, person, and place  Attention Span and Concentration:  limited  Recent and Remote Memory:  limited    Clinical Global Impressions  First:  Considering your total clinical experience with this particular patient population, how severe are the " patient's symptoms at this time?: 7 (03/06/20 1348)  Compared to the patient's condition at the START of treatment, this patient's condition is:: 4 (03/06/20 1348)  Most recent:  Considering your total clinical experience with this particular patient population, how severe are the patient's symptoms at this time?: 7 (03/06/20 1348)  Compared to the patient's condition at the START of treatment, this patient's condition is:: 4 (03/06/20 1348)         Precautions:     Behavioral Orders   Procedures     Code 1 - Restrict to Unit     Routine Programming     As clinically indicated     Single Room     Active, acute psychosis     Status 15     Every 15 minutes.          DIagnoses:     Schizophrenia  Cannabis use disorder, moderate  History of stimulant use disorder  Gastroesophageal reflux disease  Mild intermittent asthma       Plan:     His psychotropic medications have been resumed noting a recent period of noncompliance after leaving his intensive residential treatment facility.  Monitoring response and tolerability.    Psychosocial treatments to be addressed with social work consult and groups.    Legal Status:       Legal status 72 Hour Hold      Legal status Patient is Committed     Disposition: Exploring disposition options with his  while under involuntary commitment.  Anticipate referrals for intensive residential treatment services.

## 2020-03-09 NOTE — PROGRESS NOTES
Pt observed staying most of the shift in room.  Pt came out for meals, did not attend groups, and was not interactive with others.  Pt came out for a short time and paced the hallway.  Pt has short answers to questions.  Pt cooperative and pleasant.       03/09/20 1452   Sleep/Rest/Relaxation   Day/Evening Time Hours napping;resting in bed   Behavioral Health   Hallucinations denies / not responding to hallucinations   Thinking intact   Orientation person: oriented;place: oriented   Memory baseline memory   Insight poor   Judgement impaired   Eye Contact at examiner   Affect blunted, flat   Mood mood is calm   Physical Appearance/Attire attire appropriate to age and situation   Hygiene neglected grooming - unclean body, hair, teeth   Suicidality other (see comments)  (denies)   1. Wish to be Dead (Recent) No   2. Non-Specific Active Suicidal Thoughts (Recent) No   Activity isolative;withdrawn   Speech clear;coherent   Psychomotor / Gait paces;balanced;steady   Coping/Psychosocial   Verbalized Emotional State acceptance;anxiety;hopefulness   Plan of Care Reviewed With patient   Patient Agreement with Plan of Care agrees   Psycho Education   Type of Intervention 1:1 intervention   Response refuses   Group Therapy Session   Group Attendance refused to attend group session   Activities of Daily Living   Hygiene/Grooming handwashing;independent;prompts   Oral Hygiene independent   Dress scrubs (behavioral health);independent   Activity   Activity Assistance Provided independent

## 2020-03-10 PROCEDURE — 25000132 ZZH RX MED GY IP 250 OP 250 PS 637: Mod: GY | Performed by: NURSE PRACTITIONER

## 2020-03-10 PROCEDURE — 12400001 ZZH R&B MH UMMC

## 2020-03-10 PROCEDURE — 99232 SBSQ HOSP IP/OBS MODERATE 35: CPT | Performed by: PSYCHIATRY & NEUROLOGY

## 2020-03-10 RX ADMIN — GABAPENTIN 100 MG: 100 CAPSULE ORAL at 19:55

## 2020-03-10 RX ADMIN — OLANZAPINE 30 MG: 10 TABLET, FILM COATED ORAL at 20:30

## 2020-03-10 RX ADMIN — RAMELTEON 8 MG: 8 TABLET ORAL at 20:29

## 2020-03-10 RX ADMIN — TRAZODONE HYDROCHLORIDE 50 MG: 50 TABLET ORAL at 20:30

## 2020-03-10 RX ADMIN — OMEPRAZOLE 20 MG: 20 CAPSULE, DELAYED RELEASE ORAL at 08:50

## 2020-03-10 RX ADMIN — GABAPENTIN 100 MG: 100 CAPSULE ORAL at 08:50

## 2020-03-10 RX ADMIN — HYDROXYZINE HYDROCHLORIDE 25 MG: 25 TABLET ORAL at 18:53

## 2020-03-10 ASSESSMENT — ACTIVITIES OF DAILY LIVING (ADL)
HYGIENE/GROOMING: INDEPENDENT
HYGIENE/GROOMING: INDEPENDENT;PROMPTS
ORAL_HYGIENE: INDEPENDENT
DRESS: SCRUBS (BEHAVIORAL HEALTH);INDEPENDENT
ORAL_HYGIENE: INDEPENDENT
DRESS: INDEPENDENT

## 2020-03-10 NOTE — PROGRESS NOTES
Placed call to [facility]  [phone] to inform that plan is to discharge pt on Friday.  She agrees to pt returning to treatment there,  but pt will need to be taxied back to their facility as they do not provide a ride.  She offered the following address:     [address]   [facility] [place]   Main number: [phone] fax: [phone]    Also called BOBBY of his  and left message of plan to discharge on Friday.

## 2020-03-10 NOTE — PLAN OF CARE
Pt visible in milieu minimally social with peers and spent some of his time pacing the hallway. Pt. ate meals. Pt did not attend group. Pt states that he is doing okay and has no concerns.     Anxiety: denies  Depression: denies  SI/SIB/HI: denies  Auditory or Visual Hallucinations: denies  Appetite: good     Denies concerns regarding sleep or medication side effects. Pt. was compliant with medications. Nursing will continue to monitor.

## 2020-03-10 NOTE — PROGRESS NOTES
"[facility], [place]   Psychiatric Progress Note    Length of stay (days): 5        Interim History:   The patient's care was discussed with the treatment team during the daily team meeting and/or staff's chart notes were reviewed.  Staff report: no acute issues over night.     No complaints today.  He tells me that he feels well and is ready for discharge.  He inquired if he can return back to his treatment facility.  He denied suicidal and homicidal thoughts.  He denied psychotic symptoms today.  Energy is better, appetite is normal, concentration is improving.  Tolerating medications without side effects.         Medications:       gabapentin  100 mg Oral BID     OLANZapine  30 mg Oral At Bedtime    Or     OLANZapine  10 mg Intramuscular At Bedtime     omeprazole  20 mg Oral Daily     ramelteon  8 mg Oral At Bedtime          Allergies:     Allergies   Allergen Reactions     Shellfish-Derived Products Rash          Labs:   No results found for this or any previous visit (from the past 24 hour(s)).       Psychiatric Examination:     /74   Pulse 83   Temp 97  F (36.1  C) (Tympanic)   Resp 16   Ht 1.727 m (5' 8\")   Wt 81.8 kg (180 lb 4.8 oz)   SpO2 97%   BMI 27.41 kg/m    Weight is 180 lbs 4.8 oz  Body mass index is 27.41 kg/m .  Orthostatic Vitals     None            Appearance: awake, alert and slightly unkempt  Attitude:  guarded  Eye Contact:  poor   Mood:  anxious  Affect:  guarded  Speech:  decreased prosody  Psychomotor Behavior:  no evidence of tardive dyskinesia, dystonia, or tics  Throught Process:  linear  Associations:  no loose associations  Thought Content:  no evidence of suicidal ideation or homicidal ideation and no evidence of psychotic thought  Insight:  limited  Judgement:  limited  Oriented to:  time, person, and place  Attention Span and Concentration:  limited  Recent and Remote Memory:  limited    Clinical Global Impressions  First:  Considering your " total clinical experience with this particular patient population, how severe are the patient's symptoms at this time?: 7 (03/06/20 1348)  Compared to the patient's condition at the START of treatment, this patient's condition is:: 4 (03/06/20 1348)  Most recent:  Considering your total clinical experience with this particular patient population, how severe are the patient's symptoms at this time?: 7 (03/06/20 1348)  Compared to the patient's condition at the START of treatment, this patient's condition is:: 4 (03/06/20 1348)         Precautions:     Behavioral Orders   Procedures     Code 1 - Restrict to Unit     Routine Programming     As clinically indicated     Single Room     Active, acute psychosis     Status 15     Every 15 minutes.          DIagnoses:     Schizophrenia  Cannabis use disorder, moderate  History of stimulant use disorder  Gastroesophageal reflux disease  Mild intermittent asthma       Plan:     His psychotropic medications have been resumed noting a recent period of noncompliance after leaving his intensive residential treatment facility.  Monitoring response and tolerability.    Psychosocial treatments to be addressed with social work consult and groups.    Legal Status: Patient is Committed     Disposition: If improvements continue, consider discharge back to his residential treatment facility by the end of the week.

## 2020-03-10 NOTE — PROGRESS NOTES
03/10/20 1332   Behavioral Health   Hallucinations denies / not responding to hallucinations   Insight denial of illness   Affect full range affect   Mood mood is calm   1. Wish to be Dead (Recent) No   2. Non-Specific Active Suicidal Thoughts (Recent) No   Activity withdrawn;isolative   Speech clear;coherent   Psychomotor / Gait balanced;steady   Psycho Education   Type of Intervention 1:1 intervention   Response participates, initiates socially appropriate   Hours 0.5   Treatment Detail check in   Activities of Daily Living   Hygiene/Grooming independent   Oral Hygiene independent   Dress independent   Room Organization independent   Pt spent most of the shift in his room in bed. He did come out for lunch and he spent a little time pacing in the yun, but otherwise he isolated in his room. Pt's affect was bright on approach. He stated that his mood is good and he denies current mental health symptoms including SI. Pt stated that he feels ready to leave and hopes to discharge soon.

## 2020-03-10 NOTE — PROGRESS NOTES
CLINICAL NUTRITION SERVICES - REASSESSMENT NOTE     Nutrition Prescription    RECOMMENDATIONS FOR MDs/PROVIDERS TO ORDER:  None at this time    Malnutrition Status:    Patient does not meet two of the established criteria necessary for diagnosing malnutrition    Recommendations already ordered by Registered Dietitian (RD):  None at this time    Future/Additional Recommendations:  If PO intake declines, consider ordering supplements      EVALUATION OF THE PROGRESS TOWARD GOALS   Diet: Regular  Intake: Pt reports having a good appetite. He states that he is eating 1-2 meals daily, as he would if he was home. He is also snacking on food from the unit but was unable to specify exactly what he was eating.      History: Pt reports usually eating 1-2 meals per day. He does the grocery shopping and meal preparation for himself but was unable to list any specific foods that he frequently eats.       NEW FINDINGS   No new wts since pt was last assessed by RD on 3/6.    MALNUTRITION  % Intake: decreased intake does not meet criteria   % Weight Loss: None noted  Subcutaneous Fat Loss: None observed  Muscle Loss: None observed  Fluid Accumulation/Edema: None noted  Malnutrition Diagnosis: Patient does not meet two of the established criteria necessary for diagnosing malnutrition    Previous Goals   Patient to consume % of nutritionally adequate meal trays TID, or the equivalent with supplements/snacks.  Evaluation: Met    Previous Nutrition Diagnosis  Predicted inadequate nutrient intake related to pt screening for reduced PO and skipping breakfast but eating lunch.   Evaluation: No change    CURRENT NUTRITION DIAGNOSIS  Predicted inadequate nutrient intake related to pt screening for reduced PO and skipping some meals but pt also snacking.      INTERVENTIONS  Implementation  - Medical food supplement therapy - pt declined  - Modify composition of meals/snacks - pt declined as he is able to find adequate food to snack on  from the units  - Nutrition education - provided education on the importance of adequate intake and encourage the pt to eat 2-3 meals per day + snacking during admission     Goals  Patient to consume % of nutritionally adequate meal trays TID, or the equivalent with supplements/snacks.    Monitoring/Evaluation  Progress toward goals will be monitored and evaluated per protocol.      Radha Morgan RD, LD  Pager: [phone]

## 2020-03-11 PROCEDURE — 99232 SBSQ HOSP IP/OBS MODERATE 35: CPT | Performed by: PSYCHIATRY & NEUROLOGY

## 2020-03-11 PROCEDURE — 25000132 ZZH RX MED GY IP 250 OP 250 PS 637: Mod: GY | Performed by: NURSE PRACTITIONER

## 2020-03-11 PROCEDURE — 12400001 ZZH R&B MH UMMC

## 2020-03-11 RX ADMIN — GABAPENTIN 100 MG: 100 CAPSULE ORAL at 21:02

## 2020-03-11 RX ADMIN — RAMELTEON 8 MG: 8 TABLET ORAL at 21:02

## 2020-03-11 RX ADMIN — OLANZAPINE 30 MG: 10 TABLET, FILM COATED ORAL at 21:02

## 2020-03-11 RX ADMIN — GABAPENTIN 100 MG: 100 CAPSULE ORAL at 08:56

## 2020-03-11 RX ADMIN — OMEPRAZOLE 20 MG: 20 CAPSULE, DELAYED RELEASE ORAL at 08:57

## 2020-03-11 ASSESSMENT — ACTIVITIES OF DAILY LIVING (ADL)
DRESS: SCRUBS (BEHAVIORAL HEALTH);INDEPENDENT
LAUNDRY: WITH SUPERVISION
ORAL_HYGIENE: INDEPENDENT
HYGIENE/GROOMING: INDEPENDENT
LAUNDRY: UNABLE TO COMPLETE
ORAL_HYGIENE: INDEPENDENT
DRESS: INDEPENDENT
HYGIENE/GROOMING: SHOWER;INDEPENDENT

## 2020-03-11 NOTE — PROGRESS NOTES
Patient observed more in the milieu this evening.  Paced the hallway intermittently tonight.  Ate supper and snacks.  Denies SI or thoughts of harm to self or others.  Reports no depression or anxiety at this time. Patient did ask for an increase of a medication, because of times of hearing voices.  Generally pleasant and cooperative.

## 2020-03-11 NOTE — PROGRESS NOTES
"Pt was bedrest/isolative most part of the morning shift, this writer check-in with pt for 1:1, pt reported that \" I'm doing good/i came out couple times today \" pt denies of SI/SIB, pt calm/affect full range/controlled behavior, pt encouraged to go groups/ADL's, pt took shower in the afternoon/visible in the milieu, pt needs are offered/known.     03/11/20 1300   Behavioral Health   Thoughts/Cognition (WDL) ex   Hallucinations denies / not responding to hallucinations   Thinking distractable   Orientation person: oriented;place: oriented   Memory baseline memory   Insight poor   Judgement impaired   Eye Contact at examiner   Affect/Mood (WDL) ex   Affect full range affect   Mood mood is calm   ADL Assessment (WDL) WDL   Physical Appearance/Attire neat   Hygiene well groomed   Suicidality (WDL) WDL   Suicidality other (see comments)  (none)   1. Wish to be Dead (Recent) No   2. Non-Specific Active Suicidal Thoughts (Recent) No   Enviromental Risk Factors None   Self Injury other (see comment)  (none)   Elopement (WDL) WDL   Activity (WDL) Ex   Activity isolative   Speech (WDL) WDL   Speech clear;coherent   Medication Sensitivity (WDL) WDL   Medication Sensitivity no stated side effects;no observed side effects   Psychomotor Gait (WDL) WDL   Psychomotor / Gait balanced;steady   Overt Agression (WDL) WDL   Psycho Education   Type of Intervention 1:1 intervention   Response participates with encouragement   Hours 0.5   Treatment Detail check-in   Daily Care   Activity activity encouraged   Activities of Daily Living   Hygiene/Grooming shower;independent   Oral Hygiene independent   Dress scrubs (behavioral health);independent   Laundry unable to complete   Room Organization independent   Activity   Activity Assistance Provided independent     "

## 2020-03-12 PROCEDURE — 12400001 ZZH R&B MH UMMC

## 2020-03-12 PROCEDURE — 25000132 ZZH RX MED GY IP 250 OP 250 PS 637: Mod: GY | Performed by: NURSE PRACTITIONER

## 2020-03-12 PROCEDURE — 99239 HOSP IP/OBS DSCHRG MGMT >30: CPT | Performed by: PSYCHIATRY & NEUROLOGY

## 2020-03-12 RX ORDER — BENZTROPINE MESYLATE 0.5 MG/1
0.5 TABLET ORAL 2 TIMES DAILY PRN
Qty: 60 TABLET | Refills: 1 | Status: SHIPPED | OUTPATIENT
Start: 2020-03-12

## 2020-03-12 RX ORDER — OLANZAPINE 15 MG/1
30 TABLET ORAL AT BEDTIME
Qty: 60 TABLET | Refills: 1 | Status: SHIPPED | OUTPATIENT
Start: 2020-03-12

## 2020-03-12 RX ORDER — GABAPENTIN 100 MG/1
100 CAPSULE ORAL 2 TIMES DAILY
Qty: 60 CAPSULE | Refills: 1 | Status: SHIPPED | OUTPATIENT
Start: 2020-03-12

## 2020-03-12 RX ORDER — RAMELTEON 8 MG/1
8 TABLET ORAL AT BEDTIME
Qty: 30 TABLET | Refills: 1 | Status: SHIPPED | OUTPATIENT
Start: 2020-03-12 | End: 2020-03-12

## 2020-03-12 RX ADMIN — RAMELTEON 8 MG: 8 TABLET ORAL at 20:19

## 2020-03-12 RX ADMIN — OLANZAPINE 30 MG: 10 TABLET, FILM COATED ORAL at 20:19

## 2020-03-12 RX ADMIN — GABAPENTIN 100 MG: 100 CAPSULE ORAL at 20:19

## 2020-03-12 RX ADMIN — TRAZODONE HYDROCHLORIDE 50 MG: 50 TABLET ORAL at 23:39

## 2020-03-12 RX ADMIN — GABAPENTIN 100 MG: 100 CAPSULE ORAL at 08:02

## 2020-03-12 RX ADMIN — OMEPRAZOLE 20 MG: 20 CAPSULE, DELAYED RELEASE ORAL at 08:02

## 2020-03-12 ASSESSMENT — ACTIVITIES OF DAILY LIVING (ADL)
ORAL_HYGIENE: INDEPENDENT
HYGIENE/GROOMING: PROMPTS
ORAL_HYGIENE: INDEPENDENT
LAUNDRY: WITH SUPERVISION
DRESS: INDEPENDENT
HYGIENE/GROOMING: PROMPTS
LAUNDRY: WITH SUPERVISION
DRESS: INDEPENDENT

## 2020-03-12 NOTE — PLAN OF CARE
Pt was mostly isolative and withdrawn to room. Pt came out occasionally to ask for coffee. Pt was compliant with medications. Affect was blunted, flat. Mood was calm. No SI/SIB noted. Will continue to monitor.

## 2020-03-12 NOTE — DISCHARGE SUMMARY
[facility]  Department of Psychiatry    DATE OF ADMISSION:  3/5/2020    DATE OF DISCHARGE:  March 13, 2020    DISCHARGE DIAGNOSES:   Schizophrenia  Cannabis use disorder, moderate  History of stimulant use disorder  Gastroesophageal reflux disease  Mild intermittent asthma    HOSPITAL COURSE: (Refer to H&P, progress notes, and consult notes for details)    The patient was admitted to our Behavioral Health Unit under a 72-hour hold while under involuntary commitment for treatment of mental illness.  Records indicate that he was found out in the community displaying an altered mental state with prominent psychosis.  Poor compliance with medications was suspected.  His treatment was continued voluntarily.  His prior to admission medications were resumed and he was allowed some time to regain a therapeutic response to his medications.  His thought process improved and he continued to deny suicidal ideation.  Noting the improvements he had gained, plans were made to transition his care back to outpatient providers.    Other interventions received during his hospitalization included:   Psychosocial treatments were addressed with groups, social work consult, and supportive milieu provided by staff.    CONDITION AT DISCHARGE:  Improved.  The patients acute suicide risk is low due to the following factors:  improved mood/anxiety symptoms.  Denies suicidal ideations. Denies psychotic symptoms.  Not actively intoxicated and plans to abstain from illicit substances and alcohol.  Denies access to guns.  Denies feeling hopeless or helpless. At the time of discharge Hermann Arita was determined to not be an immediate danger to herself or others. The patient's acute risk will be higher if noncompliant with treatment plan, medications, follow-up or using illicit substances or alcohol.  These findings along with the risks of noncompliance with medications and treatment plan, which could potentially cause  decompensation and increase the risk for suicide, were discussed with the patient.  The patients chronic suicide risk is moderate given the following factors: white race; diagnosis of Schizoaffective Disorder,unemployed; homeless; history of chemical dependency; Denied a family history of suicide.  Preventative factors include: social supports     MENTAL STATUS EXAMINATION AT TIME OF DISCHARGE:  The patient is 34 year old White male who appears their stated age and is appropriately dressed with good hygiene.  Calm and cooperative with the interview questions.  No psychomotor abnormalities are noted. Eye contact is appropriate. Speech has normal rate, tone, latency and volume and is not pushed or pressured. Mood is euthymic and affect is full and appropriate.  The patient does not seem overtly depressed, anxious, manic or irritable.  Thought process is linear, logical and future oriented.  Thought process is not tangential, circumstantial or disorganized.  Thought content is not significant for apperant paranoia, delusions, ideas of reference or grandiosity.  The patient denies suicidal and homicidal ideations as well as auditory and visual hallucinations.  Insight and judgment are fair.  Cognition appears intact to interviewing including orientation, recent and remote memory, fund of knowledge, use of language, attention span and concentration.  Muscle strength, tone and gait appear normal on visual inspection.      DISPOSITION:  The patient is discharged to an intensive residential treatment facility while under a provisional discharge for treatment of mental illness.    FOLLOWUP APPOINTMENTS:  ( per social workers notes and after visit summary)  You can return to:   [facility]  (Robyn is the coordinator and can be reached at [phone], she will arrange your [place] [facility] [place] [place] [facility] - KARINE Pandey, CNP - [facility] [address])  [address]  [place]  Main number: [phone] fax: [phone]        [facility] : Mildred Green [phone]  (Mildred is aware of your discharge)     Please keep in touch with your [facility]  - Whit Tong  ([phone])         DISCHARGE MEDICATIONS:   Current Discharge Medication List      CONTINUE these medications which have CHANGED    Details   benztropine (COGENTIN) 0.5 MG tablet Take 1 tablet (0.5 mg) by mouth 2 times daily as needed (EPSE)  Qty: 60 tablet, Refills: 1    Associated Diagnoses: Paranoid schizophrenia (H)      gabapentin (NEURONTIN) 100 MG capsule Take 1 capsule (100 mg) by mouth 2 times daily  Qty: 60 capsule, Refills: 1    Associated Diagnoses: Paranoid schizophrenia (H)      OLANZapine (ZYPREXA) 15 MG tablet Take 2 tablets (30 mg) by mouth At Bedtime  Qty: 60 tablet, Refills: 1    Associated Diagnoses: Paranoid schizophrenia (H)      omeprazole (PRILOSEC) 20 MG DR capsule Take 1 capsule (20 mg) by mouth daily  Qty: 30 capsule, Refills: 1    Associated Diagnoses: Psychosis, unspecified psychosis type (H)         CONTINUE these medications which have NOT CHANGED    Details   ibuprofen (ADVIL/MOTRIN) 400 MG tablet Take 1-2 tablets (400-800 mg) by mouth every 6 hours as needed for moderate pain  Qty: 120 tablet, Refills: 0    Associated Diagnoses: Pain      magnesium hydroxide (MILK OF MAGNESIA) 400 MG/5ML suspension Take 30 mLs by mouth nightly as needed for constipation  Qty: 118 mL, Refills: 0    Associated Diagnoses: Constipation, unspecified constipation type      traZODone (DESYREL) 50 MG tablet Take 1 tablet (50 mg) by mouth nightly as needed for sleep  Qty: 30 tablet, Refills: 0    Associated Diagnoses: Paranoid schizophrenia (H)         STOP taking these medications       haloperidol decanoate (HALDOL DECANOATE) 100 MG/ML injection Comments:   Reason for Stopping:         ramelteon (ROZEREM) 8 MG tablet Comments:   Reason for Stopping:                 LABORATORY RESULTS: (past 14 days)  No results found for this or any previous visit (from the past 336 hour(s)).    >30 minutes was spent on this discharge to allow for reviewing the patient's response to treatment, reviewing plan of care, education on medications and diagnosis, and conducting a risk assessment.

## 2020-03-12 NOTE — PROGRESS NOTES
Pt visible first half of shift in hallway pacing, no SI, SIB stated/observed. Pt withdrawn and does not socialize with others. Pt declined vitals check this PM. No other behavioral issues, pt keeps to self and does not share much.     03/11/20 5543   Behavioral Health   Hallucinations denies / not responding to hallucinations   Thinking distractable   Orientation person: oriented;place: oriented   Memory baseline memory   Insight poor   Judgement impaired   Eye Contact at examiner   Affect full range affect   Mood mood is calm   Physical Appearance/Attire attire appropriate to age and situation   Hygiene neglected grooming - unclean body, hair, teeth   Suicidality other (see comments)  (none stated/observed)   1. Wish to be Dead (Recent) No   2. Non-Specific Active Suicidal Thoughts (Recent) No   Self Injury other (see comment)  (none stated/observed)   Elopement   (N/A)   Activity isolative;withdrawn   Speech clear;coherent   Medication Sensitivity no observed side effects;no stated side effects   Psychomotor / Gait balanced;steady   Psycho Education   Type of Intervention 1:1 intervention   Response participates, initiates socially appropriate   Hours 0.5   Treatment Detail check in   Activities of Daily Living   Hygiene/Grooming independent   Oral Hygiene independent   Dress independent   Laundry with supervision   Room Organization independent

## 2020-03-12 NOTE — PROGRESS NOTES
"[facility], [place]   Psychiatric Progress Note    Length of stay (days): 6        Interim History:   The patient's care was discussed with the treatment team during the daily team meeting and/or staff's chart notes were reviewed.  Staff report: no acute issues over night.     No complaints today.  He tells me that he feels well and is ready for discharge.  He inquired if he can return back to his treatment facility.  He denied suicidal and homicidal thoughts.  He denied psychotic symptoms today.  Energy is better, appetite is normal, concentration is improving.  Tolerating medications without side effects.         Medications:       gabapentin  100 mg Oral BID     OLANZapine  30 mg Oral At Bedtime    Or     OLANZapine  10 mg Intramuscular At Bedtime     omeprazole  20 mg Oral Daily     ramelteon  8 mg Oral At Bedtime          Allergies:     Allergies   Allergen Reactions     Shellfish-Derived Products Rash          Labs:   No results found for this or any previous visit (from the past 24 hour(s)).       Psychiatric Examination:     /72   Pulse 70   Temp 98.3  F (36.8  C) (Oral)   Resp 16   Ht 1.727 m (5' 8\")   Wt 81.8 kg (180 lb 4.8 oz)   SpO2 97%   BMI 27.41 kg/m    Weight is 180 lbs 4.8 oz  Body mass index is 27.41 kg/m .  Orthostatic Vitals     None            Appearance: awake, alert and slightly unkempt  Attitude:  guarded  Eye Contact:  poor   Mood:  anxious  Affect:  guarded  Speech:  decreased prosody  Psychomotor Behavior:  no evidence of tardive dyskinesia, dystonia, or tics  Throught Process:  linear  Associations:  no loose associations  Thought Content:  no evidence of suicidal ideation or homicidal ideation and no evidence of psychotic thought  Insight:  limited  Judgement:  limited  Oriented to:  time, person, and place  Attention Span and Concentration:  limited  Recent and Remote Memory:  limited    Clinical Global Impressions  First:  Considering your " total clinical experience with this particular patient population, how severe are the patient's symptoms at this time?: 7 (03/06/20 1348)  Compared to the patient's condition at the START of treatment, this patient's condition is:: 4 (03/06/20 1348)  Most recent:  Considering your total clinical experience with this particular patient population, how severe are the patient's symptoms at this time?: 7 (03/06/20 1348)  Compared to the patient's condition at the START of treatment, this patient's condition is:: 4 (03/06/20 1348)         Precautions:     Behavioral Orders   Procedures     Code 1 - Restrict to Unit     Routine Programming     As clinically indicated     Single Room     Active, acute psychosis     Status 15     Every 15 minutes.          DIagnoses:     Schizophrenia  Cannabis use disorder, moderate  History of stimulant use disorder  Gastroesophageal reflux disease  Mild intermittent asthma       Plan:     His psychotropic medications have been resumed noting a recent period of noncompliance after leaving his intensive residential treatment facility.  Monitoring response and tolerability.    Psychosocial treatments to be addressed with social work consult and groups.    Legal Status: Patient is Committed     Disposition: If improvements continue, consider discharge back to his residential treatment facility by the end of the week.

## 2020-03-13 VITALS
HEART RATE: 59 BPM | SYSTOLIC BLOOD PRESSURE: 116 MMHG | HEIGHT: 68 IN | TEMPERATURE: 98.3 F | BODY MASS INDEX: 27.32 KG/M2 | DIASTOLIC BLOOD PRESSURE: 71 MMHG | WEIGHT: 180.3 LBS | RESPIRATION RATE: 16 BRPM | OXYGEN SATURATION: 97 %

## 2020-03-13 PROCEDURE — 25000132 ZZH RX MED GY IP 250 OP 250 PS 637: Mod: GY | Performed by: NURSE PRACTITIONER

## 2020-03-13 RX ORDER — TRAZODONE HYDROCHLORIDE 50 MG/1
50 TABLET, FILM COATED ORAL
Qty: 30 TABLET | Refills: 0 | Status: SHIPPED | OUTPATIENT
Start: 2020-03-13

## 2020-03-13 RX ADMIN — GABAPENTIN 100 MG: 100 CAPSULE ORAL at 08:35

## 2020-03-13 RX ADMIN — OMEPRAZOLE 20 MG: 20 CAPSULE, DELAYED RELEASE ORAL at 08:35

## 2020-03-13 ASSESSMENT — ACTIVITIES OF DAILY LIVING (ADL)
ORAL_HYGIENE: INDEPENDENT
HYGIENE/GROOMING: INDEPENDENT
DRESS: INDEPENDENT
LAUNDRY: WITH SUPERVISION

## 2020-03-13 NOTE — PLAN OF CARE
Pt was pacing on hallway most of the evening shift. Was observed smiling several times, denies depression, anxiety, SIB. Pt said had good appetite but did not eat dinner, said  I m not hungry.  Pt said had no goals today,  I m okay  Pt smiled and walked away concluding the check-in. Pt mood was bright and calm. Will continue to monitor

## 2020-03-13 NOTE — DISCHARGE INSTRUCTIONS
Behavioral Discharge Planning and Instructions      Summary:    You were admitted on 3/5/2020  due to Disorganized Thinking/Behaviors.  You were treated by KARINE Washburn CNP and Dr. Percy Cordova MD and discharged on 3/13/2020 from [facility]    You remain under MI commitment through [facility] until 5/15/2020.      Principal Diagnosis:     Schizophrenia  Cannabis use disorder, moderate  History of stimulant use disorder  Gastroesophageal reflux disease  Mild intermittent asthma      Health Care Follow-up Appointments:     You can return to:   [facility]  (Robyn is the coordinator and can be reached at [phone], she will arrange your follow up visit at [facility] - KARINE Pandey CNP - [facility] [address])  [facility] [address]   [facility] [place]  Main number: [phone] fax: [phone]      [facility] : Mildred Green [phone]  (Mildred is aware of your discharge)    Please keep in touch with your [facility]  - Whit Tong  ([phone])    Attend all scheduled appointments with your outpatient providers. Call at least 24 hours in advance if you need to reschedule an appointment to ensure continued access to your outpatient providers.   Major Treatments, Procedures and Findings:  You were provided with: a psychiatric assessment, medication evaluation and/or management and milieu management    Symptoms to Report: increased confusion or mood getting worse    Early warning signs can include: increased depression or anxiety sleep disturbances increased thoughts or behaviors of suicide or self-harm  increased unusual thinking, such as paranoia or hearing voices    Safety and Wellness:  Take all medicines as directed.  Make no changes unless your doctor suggests them.  Follow treatment recommendations.  Refrain from alcohol and non-prescribed drugs.  If there is a concern for  "safety, call 911.    Resources:   Crisis Intervention: 330.103.8774 or 464-680-7681 (TTY: 244.303.6701).  Call anytime for help.  [place] on Mental Illness (www.[place].denis.org): 612.788.6373 or 763-053-9156.  Alcoholics Anonymous (www.alcoholics-anonymous.org)  [facility] Crisis (COPE) Response - Adult 217 913-5745  Text 4 Life: txt \"LIFE\" to 82349 for immediate support and crisis intervention  Crisis text line: Text \"[place]\" to 875968. Free, confidential, 24/7.  Crisis Intervention: 300.573.8738 or 868-458-9352. Call anytime for help.       The treatment team has appreciated the opportunity to work with you.     Hermann,  please take care and make your recovery a daily recovery.   If you have any questions or concerns our unit number is [phone]      "

## 2020-03-13 NOTE — PLAN OF CARE
..Pt. States ready for discharge and is requesting discharge.  Pt. Reports no suicidal ideation, self-injurious behavior.  Pt. Reports that his thoughts are clear, reality based. Pt. States that he understands his therapeutic discharge plan and his medication regime and has no concerns or questions.  Pt. States that he  will follow his medication regime and therapeutic discharge plan.

## 2020-03-13 NOTE — PLAN OF CARE
Pt was pacing on hallway most of the evening shift. Was observed smiling several times and denies any depression, anxiety, SIB. Pt said had good appetite but did not dinner, said  I m not hungry.  Pt said had not goals today,  I m okay  patient said as he smiled and walked away. Pt mood was bright and calm. Will continue to monitor

## 2020-04-17 ENCOUNTER — MEDICAL CORRESPONDENCE (OUTPATIENT)
Dept: HEALTH INFORMATION MANAGEMENT | Facility: CLINIC | Age: 35
End: 2020-04-17

## 2020-05-01 ENCOUNTER — HOSPITAL ENCOUNTER (EMERGENCY)
Facility: CLINIC | Age: 35
Discharge: HOME | End: 2020-05-01
Attending: PSYCHIATRY & NEUROLOGY | Admitting: PSYCHIATRY & NEUROLOGY
Payer: MEDICARE

## 2020-05-01 VITALS
OXYGEN SATURATION: 97 % | HEART RATE: 130 BPM | SYSTOLIC BLOOD PRESSURE: 170 MMHG | TEMPERATURE: 99.6 F | DIASTOLIC BLOOD PRESSURE: 96 MMHG

## 2020-05-01 DIAGNOSIS — F20.0 PARANOID SCHIZOPHRENIA, CHRONIC CONDITION (H): ICD-10-CM

## 2020-05-01 PROCEDURE — 90791 PSYCH DIAGNOSTIC EVALUATION: CPT

## 2020-05-01 PROCEDURE — 99285 EMERGENCY DEPT VISIT HI MDM: CPT | Mod: 25

## 2020-05-01 PROCEDURE — 25000132 ZZH RX MED GY IP 250 OP 250 PS 637: Mod: GY | Performed by: PSYCHIATRY & NEUROLOGY

## 2020-05-01 PROCEDURE — 99284 EMERGENCY DEPT VISIT MOD MDM: CPT | Mod: Z6 | Performed by: PSYCHIATRY & NEUROLOGY

## 2020-05-01 RX ORDER — OLANZAPINE 10 MG/1
10 TABLET, ORALLY DISINTEGRATING ORAL ONCE
Status: COMPLETED | OUTPATIENT
Start: 2020-05-01 | End: 2020-05-01

## 2020-05-01 RX ADMIN — OLANZAPINE 10 MG: 10 TABLET, ORALLY DISINTEGRATING ORAL at 16:42

## 2020-05-01 RX ADMIN — OLANZAPINE 10 MG: 10 TABLET, ORALLY DISINTEGRATING ORAL at 16:00

## 2020-05-01 ASSESSMENT — ENCOUNTER SYMPTOMS
RESPIRATORY NEGATIVE: 1
EYES NEGATIVE: 1
CARDIOVASCULAR NEGATIVE: 1
MUSCULOSKELETAL NEGATIVE: 1
DECREASED CONCENTRATION: 1
ACTIVITY CHANGE: 1
HALLUCINATIONS: 0
HYPERACTIVE: 1
GASTROINTESTINAL NEGATIVE: 1
NEUROLOGICAL NEGATIVE: 1

## 2020-05-01 NOTE — ED PROVIDER NOTES
ED Provider Note  [facility]      History     Chief Complaint   Patient presents with     Hallucinations     BIBA per report pt was picked up at [facility]term house, staffs thinks pt has been using meth, PD found pills on the ground PD thinks pt has not been taking his meds. Staffs went checked on pt due to pt screaming in his  room, found pt talking to self, was noted very animated ,+ hallucination, pt denies drug use. HR: 130's B     The history is provided by the patient and medical records.     Hermann Arita is a 34 year old male who is here sent from [facility] where he has been placed since 2020. Patient was hospitalized here in March for decompensation. He is under commitment and Jarvised. Patient today was noted to be loud and talking to himself. He appeared agigated and staff felt he may have decompensated and did not take his meds. He is denying any concerns. He denied making suicidal threats or harm towards others. Patient admitted to talking to himself but denied having hallucinations. He had then gone to take a nap and was woken by police and brought here. He is upset about being here. He was given 2 doses of Zyprexa which appeared to calm him down and he no longer is loud or animated or pressured in speech. He would like to return to [facility].    Please see DEC Crisis Assessment on 2020 in Epic for further details.    Past Medical History  No past medical history on file.  No past surgical history on file.  benztropine (COGENTIN) 0.5 MG tablet  gabapentin (NEURONTIN) 100 MG capsule  OLANZapine (ZYPREXA) 15 MG tablet  omeprazole (PRILOSEC) 20 MG DR capsule  traZODone (DESYREL) 50 MG tablet      Allergies   Allergen Reactions     Shellfish-Derived Products Rash     Past medical history, past surgical history, medications, and allergies were reviewed with the patient.     Family History  No family history on file.  Family history was reviewed with the  patient.     Social History  Social History     Tobacco Use     Smoking status: Light Tobacco Smoker     Packs/day: 0.00     Smokeless tobacco: Never Used   Substance Use Topics     Alcohol use: Not Currently     Frequency: Never     Drug use: Not on file      Social history was reviewed with the patient.     Review of Systems   Constitutional: Positive for activity change.   HENT: Negative.    Eyes: Negative.    Respiratory: Negative.    Cardiovascular: Negative.    Gastrointestinal: Negative.    Genitourinary: Negative.    Musculoskeletal: Negative.    Neurological: Negative.    Psychiatric/Behavioral: Positive for behavioral problems and decreased concentration. Negative for hallucinations and suicidal ideas. The patient is hyperactive.    All other systems reviewed and are negative.         Physical Exam   BP: (!) 170/96  Pulse: 130  Temp: 99.6  F (37.6  C)  SpO2: 97 %  Physical Exam  Vitals signs and nursing note reviewed.   HENT:      Head: Normocephalic.      Nose: Nose normal.      Mouth/Throat:      Mouth: Mucous membranes are moist.   Eyes:      Pupils: Pupils are equal, round, and reactive to light.   Neck:      Musculoskeletal: Normal range of motion.   Cardiovascular:      Rate and Rhythm: Normal rate.   Pulmonary:      Effort: Pulmonary effort is normal.   Abdominal:      General: Abdomen is flat.   Musculoskeletal: Normal range of motion.   Skin:     General: Skin is warm.   Neurological:      General: No focal deficit present.      Mental Status: He is alert.   Psychiatric:         Attention and Perception: Attention normal. He does not perceive auditory or visual hallucinations.         Mood and Affect: Mood normal. Affect is inappropriate.         Speech: Speech normal.         Behavior: Behavior normal. Behavior is not agitated, aggressive, hyperactive or combative. Behavior is cooperative.         Thought Content: Thought content normal. Thought content is not paranoid or delusional. Thought  content does not include homicidal or suicidal ideation.         Cognition and Memory: Cognition and memory normal.         Judgment: Judgment normal.         ED Course      Procedures           No results found for any visits on 05/01/20.  Medications   OLANZapine zydis (zyPREXA) ODT tab 10 mg (10 mg Oral Given 5/1/20 1600)   OLANZapine zydis (zyPREXA) ODT tab 10 mg (10 mg Oral Given 5/1/20 1642)        Assessments & Plan (with Medical Decision Making)   Patient with history of schizophrenia who is under commitment and Jarvised. He was causing a disturbance at [facility] and staff got concerned. He now is re-directable and no longer talking to self or acting agitated nor is hyperactive. He can be [facility] to [facility]. He is encouraged to continue taking his meds and work the treatment program.    I have reviewed the nursing notes. I have reviewed the findings, diagnosis, plan and need for follow up with the patient.    New Prescriptions    No medications on file       Final diagnoses:   Paranoid schizophrenia, chronic condition (H)       --  Josep Agrawal MD   Emergency Medicine   [facility], EMERGENCY DEPARTMENT  5/1/2020     Josep Agrawal MD  05/01/20 5305

## 2020-05-01 NOTE — ED AVS SNAPSHOT
[facility], [place], Emergency Department  [address]  [facility] [address]  Phone:  [phone]  Fax:  [phone]                                    Hermann Arita   MRN: [id number]    Department:  [facility], Emergency Department   Date of Visit:  5/1/2020           After Visit Summary Signature Page    I have received my discharge instructions, and my questions have been answered. I have discussed any challenges I see with this plan with the nurse or doctor.    ..........................................................................................................................................  Patient/Patient Representative Signature      ..........................................................................................................................................  Patient Representative Print Name and Relationship to Patient    ..................................................               ................................................  Date                                   Time    ..........................................................................................................................................  Reviewed by Signature/Title    ...................................................              ..............................................  Date                                               Time          22EPIC Rev 08/18

## 2020-05-04 ENCOUNTER — MEDICAL CORRESPONDENCE (OUTPATIENT)
Dept: HEALTH INFORMATION MANAGEMENT | Facility: CLINIC | Age: 35
End: 2020-05-04

## 2020-07-22 ENCOUNTER — VIRTUAL VISIT (OUTPATIENT)
Dept: FAMILY MEDICINE | Facility: CLINIC | Age: 35
End: 2020-07-22
Payer: MEDICARE

## 2020-07-22 DIAGNOSIS — K21.9 GASTROESOPHAGEAL REFLUX DISEASE, ESOPHAGITIS PRESENCE NOT SPECIFIED: Primary | ICD-10-CM

## 2020-07-22 PROCEDURE — 99442 ZZC PHYSICIAN TELEPHONE EVALUATION 11-20 MIN: CPT | Performed by: FAMILY MEDICINE

## 2020-07-22 NOTE — PROGRESS NOTES
"Hermann Arita is a 35 year old male who is being evaluated via a billable telephone visit.      The patient has been notified of following:     \"This telephone visit will be conducted via a call between you and your physician/provider. We have found that certain health care needs can be provided without the need for a physical exam.  This service lets us provide the care you need with a short phone conversation.  If a prescription is necessary we can send it directly to your pharmacy.  If lab work is needed we can place an order for that and you can then stop by our lab to have the test done at a later time.    Telephone visits are billed at different rates depending on your insurance coverage. During this emergency period, for some insurers they may be billed the same as an in-person visit.  Please reach out to your insurance provider with any questions.    If during the course of the call the physician/provider feels a telephone visit is not appropriate, you will not be charged for this service.\"    Patient has given verbal consent for Telephone visit?  Yes    What phone number would you like to be contacted at?     How would you like to obtain your AVS? Mail a copy    Subjective     Hermann Arita is a 35 year old male who presents via phone visit today for the following health issues:    HPI    GERD/Heartburn  Onset: ongoing    Description:     Burning in chest: YES    Intensity: mild    Progression of Symptoms: same    Accompanying Signs & Symptoms:  Does it feel like food gets stuck: no  Nausea: no  Vomiting (bloody?): no  Abdominal Pain: no  Black-Tarry stools: no:  Bloody stools: no    History:   Previous ulcers: no    Precipitating factors:   Caffeine use: YES  Alcohol use: no  NSAID/Aspirin use: no  Tobacco use: YES  Worse with no particular food or drink.    Therapies Tried and outcome:none      Patient Active Problem List   Diagnosis     Disorganized behavior     Psychosis (H)     History reviewed. No " pertinent surgical history.    Social History     Tobacco Use     Smoking status: Light Tobacco Smoker     Packs/day: 0.00     Smokeless tobacco: Never Used   Substance Use Topics     Alcohol use: Not Currently     Frequency: Never     History reviewed. No pertinent family history.      Current Outpatient Medications   Medication Sig Dispense Refill     benztropine (COGENTIN) 0.5 MG tablet Take 1 tablet (0.5 mg) by mouth 2 times daily as needed (EPSE) 60 tablet 1     gabapentin (NEURONTIN) 100 MG capsule Take 1 capsule (100 mg) by mouth 2 times daily 60 capsule 1     OLANZapine (ZYPREXA) 15 MG tablet Take 2 tablets (30 mg) by mouth At Bedtime 60 tablet 1     omeprazole (PRILOSEC) 20 MG DR capsule Take 1 capsule (20 mg) by mouth daily 90 capsule 3     traZODone (DESYREL) 50 MG tablet Take 1 tablet (50 mg) by mouth nightly as needed for sleep 30 tablet 0     Allergies   Allergen Reactions     Shellfish-Derived Products Rash     BP Readings from Last 3 Encounters:   05/01/20 (!) 170/96   03/12/20 116/71   03/05/20 103/61    Wt Readings from Last 3 Encounters:   03/05/20 81.8 kg (180 lb 4.8 oz)   03/05/20 91.2 kg (201 lb)   02/18/20 91.5 kg (201 lb 12.8 oz)                    Reviewed and updated as needed this visit by Provider         Review of Systems   Constitutional, HEENT, cardiovascular, pulmonary, GI, , musculoskeletal, neuro, skin, endocrine and psych systems are negative, except as otherwise noted.       Objective   Reported vitals:  There were no vitals taken for this visit.   healthy, alert and no distress  PSYCH: Alert and oriented times 3; coherent speech, normal   rate and volume, able to articulate logical thoughts, able   to abstract reason, no tangential thoughts, no hallucinations   or delusions  His affect is normal  RESP: No cough, no audible wheezing, able to talk in full sentences  Remainder of exam unable to be completed due to telephone visits    Diagnostic Test Results:  Labs reviewed in  Epic        Assessment/Plan:    1. Gastroesophageal reflux disease, esophagitis presence not specified  Treat with ppi daily. R/o h pylori. If no improvements, will think about further w/u with EGD.  - omeprazole (PRILOSEC) 20 MG DR capsule; Take 1 capsule (20 mg) by mouth daily  Dispense: 90 capsule; Refill: 3  - Helicobacter pylori Antigen Stool; Future    Return in about 4 weeks (around 8/19/2020) for as needed.      Phone call duration:  15 minutes    Paul Mendez MD, MD

## 2020-07-24 ENCOUNTER — TELEPHONE (OUTPATIENT)
Dept: FAMILY MEDICINE | Facility: CLINIC | Age: 35
End: 2020-07-24

## 2020-07-24 NOTE — TELEPHONE ENCOUNTER
THE NURSE FROM THE PATIENT'S GROUP HOME IS REQUESTING THE AFTER VISIT SUMMARY FOM 7/22/2020 - TELEPHONE VISIT  FAXED [phone]     PLEASE CALL [phone] IF ANY QUESTIONS - OK TO LEAVE MESSAGE

## 2020-09-25 NOTE — CONSULTS
Pt seen for initial psychiatric evaluation, please see my dictation for details and recommendations. [facility] that patient had been odd but medication compliant. He is due for his Haldol Decanoate shot. I will raise the dose to 50 mg q 4 weeks. Observe him for side effects and possibly discharge him back to [facility].    Willard Hurley MD     No complaints

## 2020-11-24 ENCOUNTER — VIRTUAL VISIT (OUTPATIENT)
Dept: FAMILY MEDICINE | Facility: CLINIC | Age: 35
End: 2020-11-24
Payer: MEDICARE

## 2020-11-24 DIAGNOSIS — S71.101A OPEN WOUND OF RIGHT THIGH, INITIAL ENCOUNTER: Primary | ICD-10-CM

## 2020-11-24 PROCEDURE — 99213 OFFICE O/P EST LOW 20 MIN: CPT | Mod: 95 | Performed by: PHYSICIAN ASSISTANT

## 2020-11-24 RX ORDER — CEPHALEXIN 500 MG/1
500 CAPSULE ORAL 3 TIMES DAILY
Qty: 30 CAPSULE | Refills: 0 | Status: SHIPPED | OUTPATIENT
Start: 2020-11-24 | End: 2020-12-04

## 2020-11-24 NOTE — PROGRESS NOTES
"Hermann Arita is a 35 year old male who is being evaluated via a billable video visit.      The patient has been notified of following:     \"This video visit will be conducted via a call between you and your physician/provider. We have found that certain health care needs can be provided without the need for an in-person physical exam.  This service lets us provide the care you need with a video conversation.  If a prescription is necessary we can send it directly to your pharmacy.  If lab work is needed we can place an order for that and you can then stop by our lab to have the test done at a later time.    Video visits are billed at different rates depending on your insurance coverage.  Please reach out to your insurance provider with any questions.    If during the course of the call the physician/provider feels a video visit is not appropriate, you will not be charged for this service.\"    Patient has given verbal consent for Video visit? Yes  How would you like to obtain your AVS? Mail a copy  If you are dropped from the video visit, the video invite should be resent to:   Will anyone else be joining your video visit? Yes group home staff         Subjective     Hermann Arita is a 35 year old male who presents today via video visit for the following health issues:    HPI     Patient not known to me   Reports was \"walking in rain 5 days ago and pants got wet and made a wound right thigh\".   Is Getting better.  Staff have been cleaning wound and applying over the counter Triple antibiotic cream  No fever,   Walking and started raining  Sore in area from rubbing against pants.  Reports was a long walk- approximately 2 miles  Was red and real inflamed.     History of Schizoaffective disorder and Duodenal ulcer with GERD - lives in group home  Takes Ibuprofen for pain  History of Constipation and   Takes Trazodone for insomnia  Staff reports that wound is approximately 3 inches by an inch   Video Start Time: 825 " "AM         Review of Systems   Constitutional, HEENT, cardiovascular, pulmonary, gi and gu systems are negative, except as otherwise noted.      Objective           Vitals:  No vitals were obtained today due to virtual visit.    Physical Exam     GENERAL: Healthy, alert and no distress  EYES: Eyes grossly normal to inspection.  No discharge or erythema, or obvious scleral/conjunctival abnormalities.  RESP: No audible wheeze, cough, or visible cyanosis.  No visible retractions or increased work of breathing.    SKIN: right thigh with macular erythematous area without vesicle or drainage  NEURO: Cranial nerves grossly intact.  Mentation and speech appropriate for age.  PSYCH: Mentation appears normal, affect normal/bright, judgement and insight intact, normal speech and appearance well-groomed.              Assessment & Plan     Open wound of right thigh, initial encounter  Continue wound care and treat with keflex.  Patient complains that gauze sticks to wound so will try using telfa dressings   - cephALEXin (KEFLEX) 500 MG capsule  Dispense: 30 capsule; Refill: 0  - Gauze Pads & Dressings (TELFA NON-ADHERENT) 3\"X4\" PADS  Dispense: 30 each; Refill: 1       Tobacco Cessation:   reports that he has been smoking. He has been smoking about 0.00 packs per day. He has never used smokeless tobacco.  Tobacco Cessation Action Plan: Information offered: Patient not interested at this time             Patient Instructions   Continue to clean wound and dress with triple antibiotic cream and telfa dressing with gauze  Take keflex 500 mg three times a day for 10 days  Return urgently if any change in symptoms like increasing wound, increasing pain, fever, sweats, chills or other change in symptoms.   Follow-up us in clinic if wound not improving over the next week.   Return urgently if any change in symptoms.        No follow-ups on file.    Tali Jackson PA-C  [facility]      Video-Visit " Details    Type of service:  Video Visit    Video End Time:8:36 AM  11 minutes     Originating Location (pt. Location): Home    Distant Location (provider location):  [facility]     Platform used for Video Visit: [employer]

## 2020-11-26 NOTE — PATIENT INSTRUCTIONS
Continue to clean wound and dress with triple antibiotic cream and telfa dressing with gauze  Take keflex 500 mg three times a day for 10 days  Return urgently if any change in symptoms like increasing wound, increasing pain, fever, sweats, chills or other change in symptoms.   Follow-up us in clinic if wound not improving over the next week.   Return urgently if any change in symptoms.

## 2020-12-01 ENCOUNTER — VIRTUAL VISIT (OUTPATIENT)
Dept: FAMILY MEDICINE | Facility: CLINIC | Age: 35
End: 2020-12-01
Payer: MEDICARE

## 2020-12-01 DIAGNOSIS — F20.0 PARANOID SCHIZOPHRENIA (H): Primary | ICD-10-CM

## 2020-12-01 PROCEDURE — 99441 PR PHYSICIAN TELEPHONE EVALUATION 5-10 MIN: CPT | Mod: 95 | Performed by: PHYSICIAN ASSISTANT

## 2020-12-01 NOTE — PROGRESS NOTES
"Hermann Arita is a 35 year old male who is being evaluated via a billable telephone visit.      The patient has been notified of following:     \"This telephone visit will be conducted via a call between you and your physician/provider. We have found that certain health care needs can be provided without the need for a physical exam.  This service lets us provide the care you need with a short phone conversation.  If a prescription is necessary we can send it directly to your pharmacy.  If lab work is needed we can place an order for that and you can then stop by our lab to have the test done at a later time.    Telephone visits are billed at different rates depending on your insurance coverage. During this emergency period, for some insurers they may be billed the same as an in-person visit.  Please reach out to your insurance provider with any questions.    If during the course of the call the physician/provider feels a telephone visit is not appropriate, you will not be charged for this service.\"    Patient has given verbal consent for Telephone visit?  Yes    What phone number would you like to be contacted at? [phone]    How would you like to obtain your AVS? Dustinhart    Subjective     Hermann Arita is a 35 year old male who presents via phone visit today for the following health issues:    HPI:        Markus- RN for patient's group home al present.  . Fax [phone]  Note to return back to work.  Had psychosis flare today and missed work.  Patient had \" a lot on his mind\" and missed work.  No SI/HI.   Feeling better now.      Patient           Review of Systems   CONSTITUTIONAL: NEGATIVE for fever, chills, change in weight  RESP: NEGATIVE for significant cough or SOB  PSYCHIATRIC: hx psychosis       Objective          Vitals:  No vitals were obtained today due to virtual visit.    healthy, alert and no distress  PSYCH: Alert and oriented times 3; coherent speech but went off on some presumably delusional " tangents, was easily redirectible,  normal rate and volume,    RESP: No cough, no audible wheezing, able to talk in full sentences  Remainder of exam unable to be completed due to telephone visits           Assessment/Plan:probably at baseline at this point. He did ask if I would prescribe him adderal. Discussed this generally not helpful in patient with his mental  health issues and he should discuss with his psych.      Assessment & Plan   Problem List Items Addressed This Visit     Psychosis (H) - Primary          excuse ntoe faxed to patient's employer.    Return in about 1 week (around 12/8/2020), or if symptoms worsen or fail to improve.    SAUL Newton  [facility]    Phone call duration:  5 minutes

## 2020-12-01 NOTE — LETTER
[address] [address]  Phone: [phone]    December 1, 2020        Hermann Arita  [address] [place] [id number]          To whom it may concern:    RE: Hermann Arita    Patient excused from work today.    Patient may return to work without restrictions.          Please contact me for questions or concerns.      Sincerely,        SAUL Newton

## 2020-12-17 ENCOUNTER — TELEPHONE (OUTPATIENT)
Dept: FAMILY MEDICINE | Facility: CLINIC | Age: 35
End: 2020-12-17

## 2020-12-17 NOTE — TELEPHONE ENCOUNTER
Forms annotated and signed and faxed.  [facility]  Please also reach out to group home and if not improving should be seen for face to face visit to further evaluate

## 2020-12-17 NOTE — TELEPHONE ENCOUNTER
Group home contacted and PCA informed. PCA requested writer to relay message to Emilia RN case manager, phone [phone].    This writer attempted to contact Emilia on 12/17/20  Reason for call provider message and left detailed message.  When patient calls back, please contact [facility] (MA/TC) Team. routine priority.      Route to [address] GAIL Lee

## 2020-12-17 NOTE — TELEPHONE ENCOUNTER
[employer] forms faxed to [facility] for completion.    Shira RIOS, Patient Care

## 2020-12-23 ENCOUNTER — TELEPHONE (OUTPATIENT)
Dept: FAMILY MEDICINE | Facility: CLINIC | Age: 35
End: 2020-12-23

## 2020-12-23 NOTE — TELEPHONE ENCOUNTER
Please call group home and advise that face to face visit will be needed for any dressing prescriptions.  Should follow up with provider if not improving.  Continue daily dressing changes after washing thoroughly

## 2020-12-23 NOTE — TELEPHONE ENCOUNTER
I don't know. Only have tele visit once with patient. Number at that visit was [phone]. Fax was [phone]. careteam was generally outside [facility] previously per care everywhere- could also try them if necessary    Catarino Briceño PA-C

## 2020-12-24 NOTE — TELEPHONE ENCOUNTER
See below.  [facility].  Will need a face to face visit to evaluate wound and measure etc. For any dressings to be covered by insurance.  Please contact group home and inform

## 2020-12-28 NOTE — TELEPHONE ENCOUNTER
Forms placed in [employer] bin for completion.  Please read cover sheet, as these need to be filled out in regards to pts appt on 11/24/2020.    Shira RIOS, Patient Care

## 2020-12-31 NOTE — TELEPHONE ENCOUNTER
Filled out form. Please scan into chart then fax back per number on front of forms with chart note from 11/24/2020.  Thank you,  KARINE Bess, NP-C  [facility]

## 2020-12-31 NOTE — TELEPHONE ENCOUNTER
Please fax forms to Audrey Allen NP [employer] fax number at [phone] to fill out.   Thank you.  KARINE Bess, NP-C  [facility]

## 2021-01-04 NOTE — TELEPHONE ENCOUNTER
Form & 11/24/20 OV notes faxed to [facility] [phone] and placed in scan bin for abstracting.      Clara BELL)HELEN)

## 2021-01-18 ENCOUNTER — OFFICE VISIT (OUTPATIENT)
Dept: URGENT CARE | Facility: URGENT CARE | Age: 36
End: 2021-01-18
Payer: MEDICARE

## 2021-01-18 VITALS
OXYGEN SATURATION: 96 % | SYSTOLIC BLOOD PRESSURE: 132 MMHG | TEMPERATURE: 97.4 F | BODY MASS INDEX: 29.65 KG/M2 | HEART RATE: 79 BPM | DIASTOLIC BLOOD PRESSURE: 79 MMHG | RESPIRATION RATE: 18 BRPM | WEIGHT: 195 LBS

## 2021-01-18 DIAGNOSIS — K29.00 ACUTE GASTRITIS WITHOUT HEMORRHAGE, UNSPECIFIED GASTRITIS TYPE: Primary | ICD-10-CM

## 2021-01-18 PROCEDURE — 99213 OFFICE O/P EST LOW 20 MIN: CPT | Performed by: FAMILY MEDICINE

## 2021-01-18 ASSESSMENT — ENCOUNTER SYMPTOMS
SHORTNESS OF BREATH: 0
FEVER: 0
SORE THROAT: 0
RHINORRHEA: 0
NAUSEA: 0
CHILLS: 0
VOMITING: 0
COUGH: 0
DIAPHORESIS: 0
DIARRHEA: 0

## 2021-01-18 NOTE — PROGRESS NOTES
"SUBJECTIVE:   Hermann Arita is a 35 year old male presenting with a chief complaint of   Chief Complaint   Patient presents with     Vomiting     Had nausea then vomited couple times yesterday. No other symptoms, Patient states he feels fine today. Need a work note.       He is an established patient of [facility].    Hermann is a 35-year-old male recent episode of midepigastric pain yesterday.  This is resolved and he would like a note to return to work  He does take omeprazole for remote history duodenal ulcer and recently GERD.     Living in a group home 24/7 with 5 other residents.     Working at "[employer]     Schizoaffective   Manic episode episode -- 3 weeks ago.   3 months ago was hospitalized --       Patient Active Problem List   Diagnosis     Disorganized behavior     Psychosis (H)        Patient Active Problem List   Diagnosis     Disorganized behavior     Psychosis (H)        Patient Active Problem List   Diagnosis     Disorganized behavior     Psychosis (H)        No past medical history on file.     Review of Systems   Constitutional: Negative for chills, diaphoresis and fever.   HENT: Negative for congestion, ear pain, rhinorrhea and sore throat.    Respiratory: Negative for cough and shortness of breath.    Gastrointestinal: Negative for diarrhea, nausea and vomiting.   Psychiatric/Behavioral:        History of schizoaffective and manic episode more recently       No past medical history on file.  No family history on file.  Current Outpatient Medications   Medication Sig Dispense Refill     benztropine (COGENTIN) 0.5 MG tablet Take 1 tablet (0.5 mg) by mouth 2 times daily as needed (EPSE) 60 tablet 1     gabapentin (NEURONTIN) 100 MG capsule Take 1 capsule (100 mg) by mouth 2 times daily 60 capsule 1     Gauze Pads & Dressings (TELFA NON-ADHERENT) 3\"X4\" PADS 30 Devices daily 30 each 1     OLANZapine (ZYPREXA) 15 MG tablet Take 2 tablets (30 mg) by mouth At Bedtime 60 tablet 1     omeprazole " (PRILOSEC) 20 MG DR capsule Take 1 capsule (20 mg) by mouth daily 90 capsule 3     traZODone (DESYREL) 50 MG tablet Take 1 tablet (50 mg) by mouth nightly as needed for sleep 30 tablet 0     Social History     Tobacco Use     Smoking status: Light Tobacco Smoker     Packs/day: 0.00     Smokeless tobacco: Never Used     Tobacco comment: 10 cigarettes a day   Substance Use Topics     Alcohol use: Not Currently     Frequency: Never       OBJECTIVE  /79 (BP Location: Left arm, Patient Position: Sitting, Cuff Size: Adult Regular)   Pulse 79   Temp 97.4  F (36.3  C) (Tympanic)   Resp 18   Wt 88.5 kg (195 lb)   SpO2 96%   BMI 29.65 kg/m      Physical Exam  Neck:      Musculoskeletal: Normal range of motion.   Cardiovascular:      Rate and Rhythm: Normal rate.   Pulmonary:      Effort: Pulmonary effort is normal.   Musculoskeletal: Normal range of motion.   Skin:     General: Skin is warm.      Capillary Refill: Capillary refill takes less than 2 seconds.   Neurological:      Mental Status: He is alert.               ASSESSMENT:    ICD-10-CM    1. Acute gastritis without hemorrhage, unspecified gastritis type  K29.00         PLAN:  I did provide a work note for his return to work tomorrow without restriction  His exam is reassuring his symptoms are resolving I will continue his omeprazole his has been doing on a daily basis.  Smoke which may worsen his symptoms tried encouraged him to quit he does not use any other alcohol reassuring.  Advised to continue follow-up with his mental health practitioner.  The patient indicates understanding of these issues and agrees with the plan.   Fahad Morillo MD

## 2021-01-18 NOTE — LETTER
[facility]  [address] [facility] [address]  Phone: [phone]    January 18, 2021        Hermann Arita  [address] [place] [id number]          To whom it may concern:    RE: Hermann Mccrary was seen today in the urgent care his symptoms seem to be improving and therefore he can return to work tomorrow without restriction.  He should stay home today and rest stay well well-hydrated.  If for some reason, his symptoms return he should return to the urgent care for further evaluation and treatment options.      Sincerely,        Fahad Morillo MD

## 2021-02-18 ENCOUNTER — OFFICE VISIT (OUTPATIENT)
Dept: FAMILY MEDICINE | Facility: CLINIC | Age: 36
End: 2021-02-18
Payer: MEDICARE

## 2021-02-18 VITALS
HEART RATE: 77 BPM | SYSTOLIC BLOOD PRESSURE: 150 MMHG | OXYGEN SATURATION: 95 % | DIASTOLIC BLOOD PRESSURE: 98 MMHG | BODY MASS INDEX: 30.04 KG/M2 | TEMPERATURE: 98.3 F | WEIGHT: 198.25 LBS | HEIGHT: 68 IN

## 2021-02-18 DIAGNOSIS — Z00.00 ENCOUNTER FOR MEDICARE ANNUAL WELLNESS EXAM: Primary | ICD-10-CM

## 2021-02-18 DIAGNOSIS — Z13.1 SCREENING FOR DIABETES MELLITUS: ICD-10-CM

## 2021-02-18 DIAGNOSIS — Z13.220 SCREENING FOR HYPERLIPIDEMIA: ICD-10-CM

## 2021-02-18 PROCEDURE — 80061 LIPID PANEL: CPT | Performed by: FAMILY MEDICINE

## 2021-02-18 PROCEDURE — G0438 PPPS, INITIAL VISIT: HCPCS | Performed by: FAMILY MEDICINE

## 2021-02-18 PROCEDURE — 80048 BASIC METABOLIC PNL TOTAL CA: CPT | Performed by: FAMILY MEDICINE

## 2021-02-18 PROCEDURE — 36415 COLL VENOUS BLD VENIPUNCTURE: CPT | Performed by: FAMILY MEDICINE

## 2021-02-18 ASSESSMENT — MIFFLIN-ST. JEOR: SCORE: 1803.76

## 2021-02-18 ASSESSMENT — PAIN SCALES - GENERAL: PAINLEVEL: NO PAIN (0)

## 2021-02-18 NOTE — PROGRESS NOTES
"  SUBJECTIVE:   Hermann Arita is a 35 year old male who presents for Preventive Visit.    Patient has been advised of split billing requirements and indicates understanding: Yes  Are you in the first 12 months of your Medicare Part B coverage?  No    Physical Health:    In general, how would you rate your overall physical health? excellent    Outside of work, how many days during the week do you exercise? 2-3 days/week    Outside of work, approximately how many minutes a day do you exercise?15-30 minutes    If you drink alcohol do you typically have >3 drinks per day or >7 drinks per week? No    Do you usually eat at least 4 servings of fruit and vegetables a day, include whole grains & fiber and avoid regularly eating high fat or \"junk\" foods? NO    Do you have any problems taking medications regularly?  No    Do you have any side effects from medications? none    Needs assistance for the following daily activities: transportation, shopping, money management and taking medicine    Which of the following safety concerns are present in your home?  none identified     Hearing impairment: No    In the past 6 months, have you been bothered by leaking of urine? no    Mental Health:    In general, how would you rate your overall mental or emotional health? excellent  PHQ-2 Score:      Do you feel safe in your environment? Yes    Have you ever done Advance Care Planning? (For example, a Health Directive, POLST, or a discussion with a medical provider or your loved ones about your wishes): No, advance care planning information given to patient to review.  Patient plans to discuss their wishes with loved ones or provider.      Additional concerns to address?  No    Fall risk:  Fallen 2 or more times in the past year?: No  Any fall with injury in the past year?: No  click delete button to remove this line now  Cognitive Screenin) Repeat 3 items (Leader, Season, Table)    2) Clock draw: NORMAL  3) 3 item recall: " Recalls 1 object   Results: ABNORMAL clock, 1-2 items recalled: PROBABLE COGNITIVE IMPAIRMENT, **INFORM PROVIDER**    Mini-CogTM Copyright WOODROW Gaitan. Licensed by the author for use in [facility]; reprinted with permission (inocente@[facility]). All rights reserved.        Reviewed and updated as needed this visit by clinical staff                 Reviewed and updated as needed this visit by Provider                Social History     Tobacco Use     Smoking status: Light Tobacco Smoker     Packs/day: 0.00     Smokeless tobacco: Never Used     Tobacco comment: 10 cigarettes a day   Substance Use Topics     Alcohol use: Not Currently     Frequency: Never                           Current providers sharing in care for this patient include:   Patient Care Team:  James Briceño PA as PCP - General (Physician Assistant - Medical)  James Briceño PA as Assigned PCP    The following health maintenance items are reviewed in Epic and correct as of today:  Health Maintenance   Topic Date Due     ADVANCE CARE PLANNING  1985     Pneumococcal Vaccine: Pediatrics (0 to 5 Years) and At-Risk Patients (6 to 64 Years) (1 of 2 - PPSV23) 07/05/1990     HIV SCREENING  07/05/1999     MEDICARE ANNUAL WELLNESS VISIT  07/05/2002     HEPATITIS C SCREENING  07/05/2002     LIPID  07/05/2019     INFLUENZA VACCINE (1) 09/01/2020     PHQ-2  01/01/2021     DTAP/TDAP/TD IMMUNIZATION (6 - Td) 05/09/2024     IPV IMMUNIZATION  Completed     MENINGITIS IMMUNIZATION  Aged Out     HEPATITIS B IMMUNIZATION  Aged Out     Lab work is in process  Labs reviewed in EPIC  BP Readings from Last 3 Encounters:   02/18/21 (!) 150/98   01/18/21 132/79   05/01/20 (!) 170/96    Wt Readings from Last 3 Encounters:   02/18/21 89.9 kg (198 lb 4 oz)   01/18/21 88.5 kg (195 lb)   03/05/20 81.8 kg (180 lb 4.8 oz)                  Patient Active Problem List   Diagnosis     Disorganized behavior     Psychosis (H)     Past Surgical History:  "  Procedure Laterality Date     NO HISTORY OF SURGERY         Social History     Tobacco Use     Smoking status: Light Tobacco Smoker     Packs/day: 0.00     Smokeless tobacco: Never Used     Tobacco comment: 10 cigarettes a day   Substance Use Topics     Alcohol use: Not Currently     Frequency: Never     History reviewed. No pertinent family history.      Current Outpatient Medications   Medication Sig Dispense Refill     benztropine (COGENTIN) 0.5 MG tablet Take 1 tablet (0.5 mg) by mouth 2 times daily as needed (EPSE) 60 tablet 1     gabapentin (NEURONTIN) 100 MG capsule Take 1 capsule (100 mg) by mouth 2 times daily 60 capsule 1     OLANZapine (ZYPREXA) 15 MG tablet Take 2 tablets (30 mg) by mouth At Bedtime 60 tablet 1     omeprazole (PRILOSEC) 20 MG DR capsule Take 1 capsule (20 mg) by mouth daily 90 capsule 3     traZODone (DESYREL) 50 MG tablet Take 1 tablet (50 mg) by mouth nightly as needed for sleep 30 tablet 0     Allergies   Allergen Reactions     Shellfish-Derived Products Rash       ROS:  Constitutional, HEENT, cardiovascular, pulmonary, GI, , musculoskeletal, neuro, skin, endocrine and psych systems are negative, except as otherwise noted.    OBJECTIVE:   BP (!) 150/98 (BP Location: Right arm, Patient Position: Sitting, Cuff Size: Adult Regular)   Pulse 77   Temp 98.3  F (36.8  C) (Oral)   Ht 1.727 m (5' 8\")   Wt 89.9 kg (198 lb 4 oz)   SpO2 95%   BMI 30.14 kg/m   Estimated body mass index is 30.14 kg/m  as calculated from the following:    Height as of this encounter: 1.727 m (5' 8\").    Weight as of this encounter: 89.9 kg (198 lb 4 oz).  EXAM:   GENERAL: healthy, alert and no distress  NECK: no adenopathy, no asymmetry, masses, or scars and thyroid normal to palpation  RESP: lungs clear to auscultation - no rales, rhonchi or wheezes  CV: regular rate and rhythm, normal S1 S2, no S3 or S4, no murmur, click or rub, no peripheral edema and peripheral pulses strong  ABDOMEN: soft, nontender, " "no hepatosplenomegaly, no masses and bowel sounds normal  MS: no gross musculoskeletal defects noted, no edema    Diagnostic Test Results:  Labs reviewed in Epic    ASSESSMENT / PLAN:   1. Encounter for Medicare annual wellness exam  As below.    2. Screening for hyperlipidemia    - Lipid panel reflex to direct LDL Non-fasting    3. Screening for diabetes mellitus    - Basic metabolic panel  (Ca, Cl, CO2, Creat, Gluc, K, Na, BUN)    Patient has been advised of split billing requirements and indicates understanding: Yes    COUNSELING:  Reviewed preventive health counseling, as reflected in patient instructions       Regular exercise       Healthy diet/nutrition       Vision screening    Estimated body mass index is 29.65 kg/m  as calculated from the following:    Height as of 3/5/20: 1.727 m (5' 8\").    Weight as of 1/18/21: 88.5 kg (195 lb).        He reports that he has been smoking. He has been smoking about 0.00 packs per day. He has never used smokeless tobacco.  Tobacco Cessation Action Plan:   Information offered: Patient not interested at this time    Appropriate preventive services were discussed with this patient, including applicable screening as appropriate for cardiovascular disease, diabetes, osteopenia/osteoporosis, and glaucoma.  As appropriate for age/gender, discussed screening for colorectal cancer, prostate cancer, breast cancer, and cervical cancer. Checklist reviewing preventive services available has been given to the patient.    Reviewed patients plan of care and provided an AVS. The Basic Care Plan (routine screening as documented in Health Maintenance) for Hermann meets the Care Plan requirement. This Care Plan has been established and reviewed with the Patient.    Counseling Resources:  ATP IV Guidelines  Pooled Cohorts Equation Calculator  Breast Cancer Risk Calculator  BRCA-Related Cancer Risk Assessment: FHS-7 Tool  FRAX Risk Assessment  ICSI Preventive Guidelines  Dietary Guidelines for " Americans, 2010  USDA's MyPlate  ASA Prophylaxis  Lung CA Screening    Paul Mendez MD, MD  [facility]

## 2021-02-18 NOTE — LETTER
February 19, 2021      Hermann Arita  [address]  [place] [address]        Dear Hermann,     Your kidney, electrolyte, blood sugar and cholesterol tests were normal for you. Please follow up in 1 year for routine physical.     Sincerely,   Paul Mendez MD   Resulted Orders   Lipid panel reflex to direct LDL Non-fasting   Result Value Ref Range    Cholesterol 216 (H) <200 mg/dL      Comment:      Desirable:       <200 mg/dl    Triglycerides 154 (H) <150 mg/dL      Comment:      Borderline high:  150-199 mg/dl  High:             200-499 mg/dl  Very high:       >499 mg/dl  Non Fasting      HDL Cholesterol 46 >39 mg/dL    LDL Cholesterol Calculated 139 (H) <100 mg/dL      Comment:      Above desirable:  100-129 mg/dl  Borderline High:  130-159 mg/dL  High:             160-189 mg/dL  Very high:       >189 mg/dl      Non HDL Cholesterol 170 (H) <130 mg/dL      Comment:      Above Desirable:  130-159 mg/dl  Borderline high:  160-189 mg/dl  High:             190-219 mg/dl  Very high:       >219 mg/dl     Basic metabolic panel  (Ca, Cl, CO2, Creat, Gluc, K, Na, BUN)   Result Value Ref Range    Sodium 136 133 - 144 mmol/L    Potassium 3.7 3.4 - 5.3 mmol/L    Chloride 103 94 - 109 mmol/L    Carbon Dioxide 27 20 - 32 mmol/L    Anion Gap 6 3 - 14 mmol/L    Glucose 93 70 - 99 mg/dL      Comment:      Non Fasting    Urea Nitrogen 18 7 - 30 mg/dL    Creatinine 1.01 0.66 - 1.25 mg/dL    GFR Estimate >90 >60 mL/min/[1.73_m2]      Comment:      Non  GFR Calc  Starting 12/18/2018, serum creatinine based estimated GFR (eGFR) will be   calculated using the Chronic Kidney Disease Epidemiology Collaboration   (CKD-EPI) equation.      GFR Estimate If Black >90 >60 mL/min/[1.73_m2]      Comment:       GFR Calc  Starting 12/18/2018, serum creatinine based estimated GFR (eGFR) will be   calculated using the Chronic Kidney Disease Epidemiology Collaboration   (CKD-EPI) equation.      Calcium 9.5 8.5 -  10.1 mg/dL

## 2021-02-18 NOTE — PATIENT INSTRUCTIONS
Patient Education   Personalized Prevention Plan  You are due for the preventive services outlined below.  Your care team is available to assist you in scheduling these services.  If you have already completed any of these items, please share that information with your care team to update in your medical record.  Health Maintenance Due   Topic Date Due     Discuss Advance Care Planning  1985     Pneumococcal Vaccine (1 of 2 - PPSV23) 07/05/1990     HIV Screening  07/05/1999     Annual Wellness Visit  07/05/2002     Hepatitis C Screening  07/05/2002     Cholesterol Lab  07/05/2019     Flu Vaccine (1) 09/01/2020     PHQ-2  01/01/2021        Patient Education   Personalized Prevention Plan  You are due for the preventive services outlined below.  Your care team is available to assist you in scheduling these services.  If you have already completed any of these items, please share that information with your care team to update in your medical record.  Health Maintenance Due   Topic Date Due     Discuss Advance Care Planning  1985     Pneumococcal Vaccine (1 of 2 - PPSV23) 07/05/1990     HIV Screening  07/05/1999     Annual Wellness Visit  07/05/2002     Hepatitis C Screening  07/05/2002     Cholesterol Lab  07/05/2019     Flu Vaccine (1) 09/01/2020     PHQ-2  01/01/2021

## 2021-02-19 LAB
ANION GAP SERPL CALCULATED.3IONS-SCNC: 6 MMOL/L (ref 3–14)
BUN SERPL-MCNC: 18 MG/DL (ref 7–30)
CALCIUM SERPL-MCNC: 9.5 MG/DL (ref 8.5–10.1)
CHLORIDE SERPL-SCNC: 103 MMOL/L (ref 94–109)
CHOLEST SERPL-MCNC: 216 MG/DL
CO2 SERPL-SCNC: 27 MMOL/L (ref 20–32)
CREAT SERPL-MCNC: 1.01 MG/DL (ref 0.66–1.25)
GFR SERPL CREATININE-BSD FRML MDRD: >90 ML/MIN/{1.73_M2}
GLUCOSE SERPL-MCNC: 93 MG/DL (ref 70–99)
HDLC SERPL-MCNC: 46 MG/DL
LDLC SERPL CALC-MCNC: 139 MG/DL
NONHDLC SERPL-MCNC: 170 MG/DL
POTASSIUM SERPL-SCNC: 3.7 MMOL/L (ref 3.4–5.3)
SODIUM SERPL-SCNC: 136 MMOL/L (ref 133–144)
TRIGL SERPL-MCNC: 154 MG/DL

## 2021-03-17 DIAGNOSIS — Z79.899 HIGH RISK MEDICATION USE: Primary | ICD-10-CM

## 2021-03-17 PROCEDURE — 80320 DRUG SCREEN QUANTALCOHOLS: CPT | Performed by: PSYCHIATRY & NEUROLOGY

## 2021-03-17 PROCEDURE — 80307 DRUG TEST PRSMV CHEM ANLYZR: CPT | Performed by: PSYCHIATRY & NEUROLOGY

## 2021-03-18 LAB
BARBITURATES UR QL: NEGATIVE
CANNABINOIDS UR QL SCN: NEGATIVE
ETHANOL UR QL SCN: NEGATIVE

## 2021-03-21 LAB
CREAT UR-MCNC: 12 MG/DL
GABAPENTIN UR QL CFM: PRESENT
RPT COMMENT: ABNORMAL

## 2022-09-27 ENCOUNTER — LAB REQUISITION (OUTPATIENT)
Dept: LAB | Age: 37
End: 2022-09-27

## 2022-09-27 DIAGNOSIS — Z00.00 ENCOUNTER FOR GENERAL ADULT MEDICAL EXAMINATION WITHOUT ABNORMAL FINDINGS: ICD-10-CM

## 2022-09-27 LAB — SODIUM UR-SCNC: 12 MMOL/L

## 2022-09-27 PROCEDURE — 84300 ASSAY OF URINE SODIUM: CPT | Performed by: CLINICAL MEDICAL LABORATORY

## 2022-09-27 PROCEDURE — PSEU8295 SODIUM, URINE: Performed by: CLINICAL MEDICAL LABORATORY

## 2023-07-15 NOTE — PROGRESS NOTES
Pt was isolative to his room for the majority of the shift. Pt appeared to be sleeping. Pt did not go to groups. Pt was not social or interactive with peers. Pt only came out eat meals. Pt denies all mental health symptoms. Pt very guarded with check in.    No

## 2023-08-08 NOTE — PROGRESS NOTES
"Hermann spent limited time outside his room this evening.  He continues to appear to be responding to internal stimuli and was oftentimes observed intensely staring or staring off into space although he currently denies hallucinations.  He continues to appear guarded and paranoid, oftentimes giving short responses to questions.  His affect appears blunted to flat.  He presents himself in soiled scrubs and appears unkept and disheveled in appearance.  Currently he denies SI and SIB urges/thoughts, responding, \"No I'm fine, I'm feeling alright.\"  Throughout the evening he consumed several cups of coffee and spent a lot of time hanging out by the nursing station.  Overall this evening he appeared more shut down and kept to himself compared to yesterday or Friday.       12/01/19 2230   Sleep/Rest/Relaxation   Day/Evening Time Hours napping;resting in bed   Number of hours napping 2 hours   Number of hours resting in bed 1 hours   Behavioral Health   Hallucinations denies / not responding to hallucinations;appears responding   Thinking distractable;paranoid;other (see comment)  (Guarded on approach.)   Orientation person: oriented;place: oriented   Memory other (see comment)  (Unable to assess. )   Insight poor   Judgement impaired   Eye Contact into space;staring;at examiner   Affect blunted, flat;tense   Mood mood is calm   Physical Appearance/Attire posture rigid;untidy;disheveled   Hygiene neglected grooming - unclean body, hair, teeth   Suicidality other (see comments)  (Currently denies SI.)   1. Wish to be Dead (Recent) No   2. Non-Specific Active Suicidal Thoughts (Recent) No   Self Injury other (see comment)  (Currently denies SIB urges/thoughts. )   Elopement   (No concernable statements or behaviors observed. )   Activity isolative;withdrawn;other (see comment)  (Among peers with very limited interactions with peers. )   Speech mute;coherent   Medication Sensitivity no stated side effects;no observed side " What Is The Reason For Today's Visit?: Full Body Skin Examination What Is The Reason For Today's Visit? (Being Monitored For X): concerning skin lesions on an annual basis effects   Psychomotor / Gait balanced;steady  (Rocking at times when still. )   Overt Aggression Scale   Verbal Aggression 0   Aggression against Property 0   Auto-Aggression 0   Physical Aggression 0   Overt Aggression Total Score 0   Coping/Psychosocial Interventions   Supportive Measures relaxation techniques promoted;verbalization of feelings encouraged   Psycho Education   Type of Intervention 1:1 intervention   Response participates, initiates socially appropriate   Hours 0.5   Treatment Detail Current MH Status.    Activities of Daily Living   Hygiene/Grooming handwashing;prompts   Oral Hygiene prompts   Dress scrubs (behavioral health);prompts   Laundry unable to complete   Room Organization prompts   Activity   Activity Assistance Provided independent   Hygiene Care Assistance   Hygiene Assistance independent   Groups   Details Declined.

## 2024-03-27 NOTE — PLAN OF CARE
Problem: Psychotic Symptoms  Goal: Psychotic Symptoms  Description  Signs and symptoms of listed problems will be absent or manageable.  1/29/2020 2234 by Jose Wylie  Outcome: No Change  Flowsheets (Taken 1/29/2020 2234)  Psychotic Symptoms Assessed: all  Psychotic Symptoms Present: affect; mood; insight; thought process; psychomotor activity  Note:   Pt presents with flat affect and depressed mood. Pt isolated for almost the entire shift except for dinner. Pt was minimally social with staff and peers, refusing to interact at times. Pt declined staff prompt for shower or to socialize. Pt was med compliant. Unable to assess Si.      unable to determine

## 2024-11-07 ENCOUNTER — HOSPITAL ENCOUNTER (EMERGENCY)
Age: 39
Discharge: PSYCHIATRIC OR SUBSTANCE USE FACILITY | End: 2024-11-07
Attending: EMERGENCY MEDICINE

## 2024-11-07 VITALS
SYSTOLIC BLOOD PRESSURE: 126 MMHG | HEART RATE: 86 BPM | RESPIRATION RATE: 18 BRPM | WEIGHT: 199.52 LBS | OXYGEN SATURATION: 96 % | DIASTOLIC BLOOD PRESSURE: 68 MMHG | TEMPERATURE: 97.6 F

## 2024-11-07 DIAGNOSIS — F99 MENTAL HEALTH DISORDER: Primary | ICD-10-CM

## 2024-11-07 LAB
ALBUMIN SERPL-MCNC: 3.9 G/DL (ref 3.4–5)
ALBUMIN/GLOB SERPL: 1.1 {RATIO} (ref 1–2.4)
ALP SERPL-CCNC: 79 UNITS/L (ref 45–117)
ALT SERPL-CCNC: 24 UNITS/L
AMPHETAMINES UR QL SCN>500 NG/ML: NEGATIVE
ANION GAP SERPL CALC-SCNC: 14 MMOL/L (ref 7–19)
APAP SERPL-MCNC: <2 MCG/ML (ref 10–30)
AST SERPL-CCNC: 14 UNITS/L
BARBITURATES UR QL SCN>200 NG/ML: NEGATIVE
BASOPHILS # BLD: 0 K/MCL (ref 0–0.3)
BASOPHILS NFR BLD: 1 %
BENZODIAZ UR QL SCN>200 NG/ML: NEGATIVE
BILIRUB SERPL-MCNC: 0.3 MG/DL (ref 0.2–1)
BUN SERPL-MCNC: 8 MG/DL (ref 6–20)
BUN/CREAT SERPL: 9 (ref 7–25)
BZE UR QL SCN>150 NG/ML: NEGATIVE
CALCIUM SERPL-MCNC: 9 MG/DL (ref 8.4–10.2)
CANNABINOIDS UR QL SCN>50 NG/ML: NEGATIVE
CHLORIDE SERPL-SCNC: 100 MMOL/L (ref 97–110)
CO2 SERPL-SCNC: 26 MMOL/L (ref 21–32)
CREAT SERPL-MCNC: 0.9 MG/DL (ref 0.67–1.17)
DEPRECATED RDW RBC: 38.4 FL (ref 39–50)
EGFRCR SERPLBLD CKD-EPI 2021: >90 ML/MIN/{1.73_M2}
EOSINOPHIL # BLD: 0.1 K/MCL (ref 0–0.5)
EOSINOPHIL NFR BLD: 1 %
ERYTHROCYTE [DISTWIDTH] IN BLOOD: 11.9 % (ref 11–15)
ETHANOL SERPL-MCNC: 3 MG/DL
FASTING DURATION TIME PATIENT: NORMAL H
FENTANYL UR QL SCN: NEGATIVE
GLOBULIN SER-MCNC: 3.5 G/DL (ref 2–4)
GLUCOSE SERPL-MCNC: 94 MG/DL (ref 70–99)
HCT VFR BLD CALC: 41.4 % (ref 39–51)
HGB BLD-MCNC: 15 G/DL (ref 13–17)
IMM GRANULOCYTES # BLD AUTO: 0 K/MCL (ref 0–0.2)
IMM GRANULOCYTES # BLD: 0 %
LYMPHOCYTES # BLD: 2.4 K/MCL (ref 1–4.8)
LYMPHOCYTES NFR BLD: 38 %
MCH RBC QN AUTO: 32.1 PG (ref 26–34)
MCHC RBC AUTO-ENTMCNC: 36.2 G/DL (ref 32–36.5)
MCV RBC AUTO: 88.5 FL (ref 78–100)
MONOCYTES # BLD: 0.5 K/MCL (ref 0.3–0.9)
MONOCYTES NFR BLD: 8 %
NEUTROPHILS # BLD: 3.4 K/MCL (ref 1.8–7.7)
NEUTROPHILS NFR BLD: 52 %
NRBC BLD MANUAL-RTO: 0 /100 WBC
OPIATES UR QL SCN>300 NG/ML: NEGATIVE
PCP UR QL SCN>25 NG/ML: NEGATIVE
PLATELET # BLD AUTO: 291 K/MCL (ref 140–450)
POTASSIUM SERPL-SCNC: 3.7 MMOL/L (ref 3.4–5.1)
PROT SERPL-MCNC: 7.4 G/DL (ref 6.4–8.2)
RBC # BLD: 4.68 MIL/MCL (ref 4.5–5.9)
SALICYLATES SERPL-MCNC: 5.5 MG/DL
SODIUM SERPL-SCNC: 136 MMOL/L (ref 135–145)
TSH SERPL-ACNC: 1.93 MCUNITS/ML (ref 0.35–5)
VALPROATE SERPL-MCNC: 41 MCG/ML (ref 50–125)
WBC # BLD: 6.4 K/MCL (ref 4.2–11)

## 2024-11-07 PROCEDURE — 84443 ASSAY THYROID STIM HORMONE: CPT | Performed by: EMERGENCY MEDICINE

## 2024-11-07 PROCEDURE — 80143 DRUG ASSAY ACETAMINOPHEN: CPT | Performed by: EMERGENCY MEDICINE

## 2024-11-07 PROCEDURE — 80164 ASSAY DIPROPYLACETIC ACD TOT: CPT | Performed by: EMERGENCY MEDICINE

## 2024-11-07 PROCEDURE — 96360 HYDRATION IV INFUSION INIT: CPT

## 2024-11-07 PROCEDURE — 80053 COMPREHEN METABOLIC PANEL: CPT | Performed by: EMERGENCY MEDICINE

## 2024-11-07 PROCEDURE — 99285 EMERGENCY DEPT VISIT HI MDM: CPT | Performed by: EMERGENCY MEDICINE

## 2024-11-07 PROCEDURE — 85025 COMPLETE CBC W/AUTO DIFF WBC: CPT | Performed by: EMERGENCY MEDICINE

## 2024-11-07 PROCEDURE — 99284 EMERGENCY DEPT VISIT MOD MDM: CPT

## 2024-11-07 PROCEDURE — 10002807 HB RX 258: Performed by: EMERGENCY MEDICINE

## 2024-11-07 PROCEDURE — 36415 COLL VENOUS BLD VENIPUNCTURE: CPT

## 2024-11-07 PROCEDURE — 80307 DRUG TEST PRSMV CHEM ANLYZR: CPT | Performed by: EMERGENCY MEDICINE

## 2024-11-07 PROCEDURE — 80179 DRUG ASSAY SALICYLATE: CPT | Performed by: EMERGENCY MEDICINE

## 2024-11-07 PROCEDURE — 82077 ASSAY SPEC XCP UR&BREATH IA: CPT | Performed by: EMERGENCY MEDICINE

## 2024-11-07 RX ORDER — DIVALPROEX SODIUM 500 MG/1
500 TABLET, FILM COATED, EXTENDED RELEASE ORAL DAILY
COMMUNITY

## 2024-11-07 RX ORDER — BENZTROPINE MESYLATE 2 MG/1
2 TABLET ORAL 2 TIMES DAILY
COMMUNITY

## 2024-11-07 RX ORDER — LEVOTHYROXINE SODIUM 25 UG/1
25 TABLET ORAL DAILY
COMMUNITY

## 2024-11-07 RX ORDER — DIVALPROEX SODIUM 250 MG/1
250 TABLET, FILM COATED, EXTENDED RELEASE ORAL DAILY
COMMUNITY

## 2024-11-07 RX ADMIN — SODIUM CHLORIDE 1000 ML: 9 INJECTION, SOLUTION INTRAVENOUS at 16:46

## 2024-11-07 SDOH — SOCIAL STABILITY: SOCIAL INSECURITY: HOW OFTEN DOES ANYONE, INCLUDING FAMILY AND FRIENDS, PHYSICALLY HURT YOU?: NEVER

## 2024-11-07 SDOH — SOCIAL STABILITY: SOCIAL INSECURITY: HOW OFTEN DOES ANYONE, INCLUDING FAMILY AND FRIENDS, INSULT OR TALK DOWN TO YOU?: NEVER

## 2024-11-07 SDOH — SOCIAL STABILITY: SOCIAL INSECURITY: HOW OFTEN DOES ANYONE, INCLUDING FAMILY AND FRIENDS, THREATEN YOU WITH HARM?: NEVER

## 2024-11-07 SDOH — SOCIAL STABILITY: SOCIAL INSECURITY: HOW OFTEN DOES ANYONE, INCLUDING FAMILY AND FRIENDS, SCREAM OR CURSE AT YOU?: NEVER

## 2024-11-07 ASSESSMENT — ENCOUNTER SYMPTOMS
ABDOMINAL PAIN: 0
SHORTNESS OF BREATH: 0
BLOOD IN STOOL: 0
WEAKNESS: 0
EYE PAIN: 0
PHOTOPHOBIA: 0
VOMITING: 0
COUGH: 0
DIAPHORESIS: 0
SORE THROAT: 0
HEADACHES: 0
FEVER: 0
CONFUSION: 0
CHILLS: 0
DIARRHEA: 0
ADENOPATHY: 0
CHEST TIGHTNESS: 0
DIZZINESS: 0
RHINORRHEA: 0

## 2024-11-07 ASSESSMENT — PAIN SCALES - GENERAL: PAINLEVEL_OUTOF10: 0
